# Patient Record
Sex: MALE | Race: WHITE | NOT HISPANIC OR LATINO | Employment: OTHER | URBAN - METROPOLITAN AREA
[De-identification: names, ages, dates, MRNs, and addresses within clinical notes are randomized per-mention and may not be internally consistent; named-entity substitution may affect disease eponyms.]

---

## 2023-04-04 LAB
ANION GAP IN SER/PLAS: 10 MMOL/L (ref 10–20)
CALCIUM (MG/DL) IN SER/PLAS: 9.6 MG/DL (ref 8.6–10.3)
CARBON DIOXIDE, TOTAL (MMOL/L) IN SER/PLAS: 29 MMOL/L (ref 21–32)
CHLORIDE (MMOL/L) IN SER/PLAS: 103 MMOL/L (ref 98–107)
CREATININE (MG/DL) IN SER/PLAS: 0.76 MG/DL (ref 0.5–1.3)
GFR MALE: >90 ML/MIN/1.73M2
GLUCOSE (MG/DL) IN SER/PLAS: 77 MG/DL (ref 74–99)
POTASSIUM (MMOL/L) IN SER/PLAS: 4.1 MMOL/L (ref 3.5–5.3)
SODIUM (MMOL/L) IN SER/PLAS: 138 MMOL/L (ref 136–145)
UREA NITROGEN (MG/DL) IN SER/PLAS: 15 MG/DL (ref 6–23)

## 2023-06-14 PROBLEM — I25.10 CORONARY ARTERY DISEASE INVOLVING NATIVE CORONARY ARTERY OF NATIVE HEART WITHOUT ANGINA PECTORIS: Status: ACTIVE | Noted: 2023-06-14

## 2023-06-14 PROBLEM — C61 PROSTATE CANCER (MULTI): Status: ACTIVE | Noted: 2023-06-14

## 2023-06-14 PROBLEM — G61.81: Status: ACTIVE | Noted: 2023-06-14

## 2023-06-14 PROBLEM — C67.9 BLADDER CANCER (MULTI): Status: ACTIVE | Noted: 2023-06-14

## 2023-06-14 PROBLEM — C67.9 BLADDER CANCER (MULTI): Status: RESOLVED | Noted: 2023-06-14 | Resolved: 2023-06-14

## 2023-06-14 PROBLEM — I25.10 CORONARY ARTERY DISEASE INVOLVING NATIVE CORONARY ARTERY OF NATIVE HEART WITHOUT ANGINA PECTORIS: Status: RESOLVED | Noted: 2023-06-14 | Resolved: 2023-06-14

## 2023-06-14 PROBLEM — R26.81 UNSTABLE GAIT: Status: ACTIVE | Noted: 2023-06-14

## 2023-06-14 PROBLEM — G61.81: Status: RESOLVED | Noted: 2023-06-14 | Resolved: 2023-06-14

## 2023-06-14 PROBLEM — C61 PROSTATE CANCER (MULTI): Status: RESOLVED | Noted: 2023-06-14 | Resolved: 2023-06-14

## 2023-06-15 ENCOUNTER — OFFICE VISIT (OUTPATIENT)
Dept: PRIMARY CARE | Facility: CLINIC | Age: 78
End: 2023-06-15
Payer: MEDICARE

## 2023-06-15 VITALS
WEIGHT: 167.2 LBS | DIASTOLIC BLOOD PRESSURE: 74 MMHG | BODY MASS INDEX: 24.69 KG/M2 | SYSTOLIC BLOOD PRESSURE: 110 MMHG | OXYGEN SATURATION: 98 % | HEART RATE: 88 BPM | TEMPERATURE: 97.2 F

## 2023-06-15 DIAGNOSIS — G61.81 CHRONIC INFLAMMATORY DEMYELINATING POLYNEUROPATHY (MULTI): Primary | ICD-10-CM

## 2023-06-15 DIAGNOSIS — R26.81 UNSTABLE GAIT: ICD-10-CM

## 2023-06-15 DIAGNOSIS — I25.10 CORONARY ARTERY DISEASE INVOLVING NATIVE CORONARY ARTERY OF NATIVE HEART WITHOUT ANGINA PECTORIS: ICD-10-CM

## 2023-06-15 DIAGNOSIS — B35.1 TINEA OF NAIL: ICD-10-CM

## 2023-06-15 DIAGNOSIS — C67.9 MALIGNANT NEOPLASM OF URINARY BLADDER, UNSPECIFIED SITE (MULTI): ICD-10-CM

## 2023-06-15 PROBLEM — C34.90 LUNG CANCER (MULTI): Status: ACTIVE | Noted: 2023-06-15

## 2023-06-15 PROCEDURE — 99213 OFFICE O/P EST LOW 20 MIN: CPT | Performed by: FAMILY MEDICINE

## 2023-06-15 PROCEDURE — 1036F TOBACCO NON-USER: CPT | Performed by: FAMILY MEDICINE

## 2023-06-15 PROCEDURE — 1159F MED LIST DOCD IN RCRD: CPT | Performed by: FAMILY MEDICINE

## 2023-06-15 PROCEDURE — 1160F RVW MEDS BY RX/DR IN RCRD: CPT | Performed by: FAMILY MEDICINE

## 2023-06-15 RX ORDER — KETOCONAZOLE 20 MG/ML
SHAMPOO, SUSPENSION TOPICAL
COMMUNITY
Start: 2021-09-15

## 2023-06-15 RX ORDER — ASPIRIN 81 MG/1
1 TABLET ORAL DAILY
COMMUNITY

## 2023-06-15 RX ORDER — ROSUVASTATIN CALCIUM 5 MG/1
5 TABLET, COATED ORAL DAILY
Qty: 30 TABLET | Refills: 11 | Status: SHIPPED | OUTPATIENT
Start: 2023-06-15 | End: 2024-02-28 | Stop reason: SDUPTHER

## 2023-06-15 RX ORDER — DOCUSATE SODIUM 100 MG/1
CAPSULE, LIQUID FILLED ORAL 2 TIMES DAILY
COMMUNITY
Start: 2022-07-07 | End: 2023-11-30 | Stop reason: SDUPTHER

## 2023-06-15 RX ORDER — CYCLOSPORINE 0.5 MG/ML
EMULSION OPHTHALMIC
COMMUNITY
Start: 2022-11-22 | End: 2024-01-31 | Stop reason: SDUPTHER

## 2023-06-15 RX ORDER — CARBOXYMETHYLCELLULOSE SODIUM 5 MG/ML
SOLUTION/ DROPS OPHTHALMIC 4 TIMES DAILY
COMMUNITY
Start: 2022-04-26

## 2023-06-15 RX ORDER — ACETAMINOPHEN 500 MG
TABLET ORAL
COMMUNITY

## 2023-06-15 RX ORDER — PREDNISONE 5 MG/1
1 TABLET ORAL DAILY
COMMUNITY
Start: 2022-12-19 | End: 2023-11-29 | Stop reason: SDUPTHER

## 2023-06-15 RX ORDER — POLYETHYLENE GLYCOL 3350 17 G/17G
POWDER, FOR SOLUTION ORAL
COMMUNITY

## 2023-06-15 RX ORDER — CARBIDOPA AND LEVODOPA 25; 100 MG/1; MG/1
1.5 TABLET ORAL 3 TIMES DAILY
COMMUNITY
Start: 2023-05-11 | End: 2023-10-19 | Stop reason: SDUPTHER

## 2023-06-15 NOTE — PATIENT INSTRUCTIONS
I sent in a prescription for rosuvastatin and we will check your cholesterol numbers when I see you in 3 months.  You should be contacted about a podiatry appointment.

## 2023-06-15 NOTE — PROGRESS NOTES
Subjective   Patient ID: Wade Montgomery is a 78 y.o. male who presents for Follow-up.    Myalgias that he was having when I saw him last disappeared once he stopped the atorvastatin.  We discussed trying rosuvastatin to see if he gets the same reaction.    He has developed thickened nails and wondered about seeing a podiatrist for toenail care.,  He is being followed by oncology for inflammatory demyelination and he is followed as well with his history of bladder, lung and prostate cancer.    In general, he is holding his own.  His blood pressure is fine today              Objective   /74 (BP Location: Left arm, Patient Position: Sitting, BP Cuff Size: Adult)   Pulse 88   Temp 36.2 °C (97.2 °F) (Skin)   Wt 75.8 kg (167 lb 3.2 oz)   SpO2 98%   BMI 24.69 kg/m²     Physical Exam  Constitutional:       Appearance: Normal appearance.   Cardiovascular:      Rate and Rhythm: Normal rate and regular rhythm.   Neurological:      Mental Status: He is alert.   Psychiatric:         Mood and Affect: Mood normal.         Behavior: Behavior normal.         Thought Content: Thought content normal.         Judgment: Judgment normal.         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  We will do an in office lipid panel at that time.

## 2023-08-15 ENCOUNTER — APPOINTMENT (OUTPATIENT)
Dept: PRIMARY CARE | Facility: CLINIC | Age: 78
End: 2023-08-15
Payer: MEDICARE

## 2023-09-18 PROBLEM — R49.9 UNSPECIFIED VOICE AND RESONANCE DISORDER: Status: ACTIVE | Noted: 2023-09-18

## 2023-09-18 PROBLEM — C34.32 MALIGNANT NEOPLASM OF LOWER LOBE OF LEFT LUNG (MULTI): Status: ACTIVE | Noted: 2023-09-18

## 2023-09-18 PROBLEM — G62.9 POLYNEUROPATHY: Status: ACTIVE | Noted: 2020-12-24

## 2023-09-18 PROBLEM — D47.2 MONOCLONAL GAMMOPATHY: Status: ACTIVE | Noted: 2020-12-24

## 2023-09-18 PROBLEM — H16.149 SPK (SUPERFICIAL PUNCTATE KERATITIS): Status: ACTIVE | Noted: 2023-09-18

## 2023-09-18 PROBLEM — R13.10 DYSPHAGIA: Status: ACTIVE | Noted: 2023-09-18

## 2023-09-18 PROBLEM — L82.1 OTHER SEBORRHEIC KERATOSIS: Status: ACTIVE | Noted: 2023-03-29

## 2023-09-18 PROBLEM — G61.81 CHRONIC INFLAMMATORY DEMYELINATING POLYNEURITIS (MULTI): Status: ACTIVE | Noted: 2020-12-24

## 2023-09-18 PROBLEM — R49.0 HOARSENESS OF VOICE: Status: ACTIVE | Noted: 2023-09-18

## 2023-09-18 PROBLEM — R41.3 MEMORY CHANGES: Status: ACTIVE | Noted: 2023-09-18

## 2023-09-18 PROBLEM — D22.60 MELANOCYTIC NEVI OF UNSPECIFIED UPPER LIMB, INCLUDING SHOULDER: Status: ACTIVE | Noted: 2023-03-29

## 2023-09-18 PROBLEM — N52.9 ERECTILE DISORDER: Status: ACTIVE | Noted: 2023-09-18

## 2023-09-18 PROBLEM — G20.C PARKINSONISM (MULTI): Status: ACTIVE | Noted: 2023-09-18

## 2023-09-18 PROBLEM — C44.612 BASAL CELL CARCINOMA OF SKIN OF RIGHT UPPER LIMB, INCLUDING SHOULDER: Status: ACTIVE | Noted: 2023-03-29

## 2023-09-18 PROBLEM — D64.9 ANEMIA: Status: ACTIVE | Noted: 2020-12-24

## 2023-09-18 PROBLEM — H69.90 EUSTACHIAN TUBE DYSFUNCTION: Status: ACTIVE | Noted: 2021-11-24

## 2023-09-18 PROBLEM — M17.11 ARTHRITIS OF KNEE, RIGHT: Status: ACTIVE | Noted: 2023-09-18

## 2023-09-18 PROBLEM — H52.209 ASTIGMATISM: Status: ACTIVE | Noted: 2023-09-18

## 2023-09-18 PROBLEM — R06.09 DOE (DYSPNEA ON EXERTION): Status: ACTIVE | Noted: 2023-09-18

## 2023-09-18 PROBLEM — J38.02 BILATERAL PARTIAL VOCAL CORD PARALYSIS: Status: ACTIVE | Noted: 2023-09-18

## 2023-09-18 PROBLEM — J38.3 GLOTTIC INSUFFICIENCY: Status: ACTIVE | Noted: 2023-09-18

## 2023-09-18 PROBLEM — R09.89 HEMODYNAMIC INSTABILITY: Status: ACTIVE | Noted: 2023-09-18

## 2023-09-18 PROBLEM — Z85.828 PERSONAL HISTORY OF OTHER MALIGNANT NEOPLASM OF SKIN: Status: ACTIVE | Noted: 2020-12-24

## 2023-09-18 PROBLEM — L82.0 INFLAMED SEBORRHEIC KERATOSIS: Status: ACTIVE | Noted: 2023-03-29

## 2023-09-18 PROBLEM — M71.20 BAKER CYST: Status: ACTIVE | Noted: 2023-09-18

## 2023-09-18 PROBLEM — D47.2 MGUS (MONOCLONAL GAMMOPATHY OF UNKNOWN SIGNIFICANCE): Status: ACTIVE | Noted: 2023-09-18

## 2023-09-18 PROBLEM — M48.00 SPINAL STENOSIS: Status: ACTIVE | Noted: 2023-09-18

## 2023-09-18 PROBLEM — C67.8 MALIGNANT NEOPLASM OF OVERLAPPING SITES OF BLADDER (MULTI): Status: ACTIVE | Noted: 2023-09-18

## 2023-09-18 PROBLEM — H90.3 SENSORINEURAL HEARING LOSS, BILATERAL: Status: ACTIVE | Noted: 2021-11-24

## 2023-09-18 PROBLEM — L57.0 ACTINIC KERATOSIS: Status: ACTIVE | Noted: 2023-03-29

## 2023-09-18 PROBLEM — D22.5 MELANOCYTIC NEVI OF TRUNK: Status: ACTIVE | Noted: 2023-03-29

## 2023-09-18 PROBLEM — N53.19 ABNORMAL EJACULATION: Status: ACTIVE | Noted: 2023-09-18

## 2023-09-18 PROBLEM — R91.8 LUNG NODULE, MULTIPLE: Status: ACTIVE | Noted: 2023-09-18

## 2023-09-18 PROBLEM — G52.1 GLOSSOPHARYNGEAL NERVE DISORDER: Status: ACTIVE | Noted: 2023-09-18

## 2023-09-18 PROBLEM — M65.30 TRIGGER FINGER, ACQUIRED: Status: ACTIVE | Noted: 2023-09-18

## 2023-09-18 PROBLEM — D22.4 MELANOCYTIC NEVI OF SCALP AND NECK: Status: ACTIVE | Noted: 2023-03-29

## 2023-09-18 PROBLEM — G25.9 MOVEMENT DISORDER: Status: ACTIVE | Noted: 2023-09-18

## 2023-09-18 PROBLEM — Z85.51 HISTORY OF BLADDER CANCER: Status: ACTIVE | Noted: 2023-09-18

## 2023-09-18 PROBLEM — L28.0 LICHEN SIMPLEX CHRONICUS: Status: ACTIVE | Noted: 2023-03-29

## 2023-09-18 PROBLEM — C44.319 BASAL CELL CARCINOMA OF SKIN OF OTHER PARTS OF FACE: Status: ACTIVE | Noted: 2023-03-29

## 2023-09-18 PROBLEM — R29.898 WEAKNESS OF EXTREMITY: Status: ACTIVE | Noted: 2023-09-18

## 2023-09-18 PROBLEM — E78.2 MIXED HYPERLIPIDEMIA: Status: ACTIVE | Noted: 2023-09-18

## 2023-09-18 PROBLEM — H02.839 DERMATOCHALASIS: Status: ACTIVE | Noted: 2023-09-18

## 2023-09-18 PROBLEM — H01.006 BLEPHARITIS, LEFT EYE: Status: ACTIVE | Noted: 2023-09-18

## 2023-09-18 PROBLEM — D89.89 KAPPA LIGHT CHAIN DISEASE (MULTI): Status: ACTIVE | Noted: 2023-09-18

## 2023-09-18 PROBLEM — H61.23 BILATERAL IMPACTED CERUMEN: Status: ACTIVE | Noted: 2021-11-24

## 2023-09-18 PROBLEM — K40.90 LEFT INGUINAL HERNIA: Status: ACTIVE | Noted: 2023-09-18

## 2023-09-18 PROBLEM — H52.00 HYPEROPIA: Status: ACTIVE | Noted: 2023-09-18

## 2023-09-18 PROBLEM — Z96.1 PSEUDOPHAKIA OF BOTH EYES: Status: ACTIVE | Noted: 2023-09-18

## 2023-09-18 PROBLEM — D48.5 NEOPLASM OF UNCERTAIN BEHAVIOR OF SKIN: Status: ACTIVE | Noted: 2023-03-29

## 2023-09-18 PROBLEM — R26.89 UNSTABLE BALANCE: Status: ACTIVE | Noted: 2023-09-18

## 2023-09-18 PROBLEM — C44.92 SCC (SQUAMOUS CELL CARCINOMA): Status: ACTIVE | Noted: 2023-09-18

## 2023-09-18 PROBLEM — R29.898 WEAKNESS OF BOTH HANDS: Status: ACTIVE | Noted: 2023-09-18

## 2023-09-18 PROBLEM — L90.5 SCAR CONDITION AND FIBROSIS OF SKIN: Status: ACTIVE | Noted: 2023-03-29

## 2023-09-18 PROBLEM — M65.331 TRIGGER MIDDLE FINGER OF RIGHT HAND: Status: ACTIVE | Noted: 2023-09-18

## 2023-09-18 PROBLEM — R91.8 GROUND GLASS OPACITY PRESENT ON IMAGING OF LUNG: Status: ACTIVE | Noted: 2023-09-18

## 2023-09-18 PROBLEM — R33.9 URINARY RETENTION: Status: ACTIVE | Noted: 2023-09-18

## 2023-09-18 PROBLEM — R25.3 TONGUE FASCICULATION: Status: ACTIVE | Noted: 2023-09-18

## 2023-09-18 PROBLEM — L57.9 SKIN CHANGES DUE TO CHRONIC EXPOSURE TO NONIONIZING RADIATION, UNSPECIFIED: Status: ACTIVE | Noted: 2023-03-29

## 2023-09-18 PROBLEM — D22.39 MELANOCYTIC NEVI OF OTHER PARTS OF FACE: Status: ACTIVE | Noted: 2023-03-29

## 2023-09-18 PROBLEM — E86.1 HYPOVOLEMIA: Status: ACTIVE | Noted: 2023-09-18

## 2023-09-18 PROBLEM — D22.70 MELANOCYTIC NEVI OF UNSPECIFIED LOWER LIMB, INCLUDING HIP: Status: ACTIVE | Noted: 2023-03-29

## 2023-09-18 PROBLEM — D31.32 CHOROIDAL NEVUS OF LEFT EYE: Status: ACTIVE | Noted: 2023-09-18

## 2023-09-18 PROBLEM — G60.3 IDIOPATHIC PROGRESSIVE NEUROPATHY: Status: ACTIVE | Noted: 2023-09-18

## 2023-09-18 PROBLEM — D18.01 HEMANGIOMA OF SKIN AND SUBCUTANEOUS TISSUE: Status: ACTIVE | Noted: 2023-03-29

## 2023-09-18 PROBLEM — R31.9 HEMATURIA: Status: ACTIVE | Noted: 2023-09-18

## 2023-09-18 PROBLEM — D04.4 CARCINOMA IN SITU OF SKIN OF SCALP AND NECK: Status: ACTIVE | Noted: 2023-03-29

## 2023-09-18 PROBLEM — C68.9 UROTHELIAL CANCER (MULTI): Status: ACTIVE | Noted: 2023-09-18

## 2023-09-18 PROBLEM — H26.491 PCO (POSTERIOR CAPSULAR OPACIFICATION), RIGHT: Status: ACTIVE | Noted: 2023-09-18

## 2023-09-18 PROBLEM — R20.9 DISTURBANCE OF SKIN SENSATION: Status: ACTIVE | Noted: 2023-09-18

## 2023-09-18 PROBLEM — H02.403 ACQUIRED INVOLUTIONAL PTOSIS OF BOTH EYELIDS: Status: ACTIVE | Noted: 2023-09-18

## 2023-09-18 PROBLEM — R07.9 CHEST PAIN: Status: ACTIVE | Noted: 2023-09-18

## 2023-09-18 PROBLEM — D47.2 IGM MONOCLONAL GAMMOPATHY OF UNCERTAIN SIGNIFICANCE: Status: ACTIVE | Noted: 2023-09-18

## 2023-09-18 PROBLEM — R94.39 ABNORMAL STRESS TEST: Status: ACTIVE | Noted: 2023-09-18

## 2023-09-18 PROBLEM — M48.061 SPINAL STENOSIS OF LUMBAR REGION AT MULTIPLE LEVELS: Status: ACTIVE | Noted: 2018-01-05

## 2023-09-18 PROBLEM — L21.9 SEBORRHEIC DERMATITIS, UNSPECIFIED: Status: ACTIVE | Noted: 2023-03-29

## 2023-09-18 PROBLEM — L85.3 XEROSIS CUTIS: Status: ACTIVE | Noted: 2023-03-29

## 2023-09-18 PROBLEM — M79.89 CALF SWELLING: Status: ACTIVE | Noted: 2023-09-18

## 2023-09-18 PROBLEM — G62.9 PERIPHERAL NEUROPATHY: Status: ACTIVE | Noted: 2023-09-18

## 2023-09-18 RX ORDER — SERTRALINE HYDROCHLORIDE 25 MG/1
1 TABLET, FILM COATED ORAL DAILY
COMMUNITY
Start: 2021-01-06 | End: 2023-11-30 | Stop reason: ALTCHOICE

## 2023-09-18 RX ORDER — DOCUSATE SODIUM 100 MG/1
2 CAPSULE, LIQUID FILLED ORAL DAILY
COMMUNITY

## 2023-09-18 RX ORDER — PREDNISONE 10 MG/1
1 TABLET ORAL 2 TIMES DAILY
COMMUNITY
End: 2023-11-30 | Stop reason: SDUPTHER

## 2023-09-18 RX ORDER — DICLOFENAC SODIUM 10 MG/G
4 GEL TOPICAL 4 TIMES DAILY
COMMUNITY
Start: 2022-04-20

## 2023-09-18 RX ORDER — IBUPROFEN 600 MG/1
600 TABLET ORAL EVERY 6 HOURS PRN
COMMUNITY
Start: 2022-07-07

## 2023-09-18 RX ORDER — PSYLLIUM SEED
PACKET (EA) ORAL
COMMUNITY
End: 2023-10-23 | Stop reason: ALTCHOICE

## 2023-09-18 RX ORDER — ATORVASTATIN CALCIUM 40 MG/1
1 TABLET, FILM COATED ORAL DAILY
COMMUNITY
Start: 2022-01-03 | End: 2023-11-30 | Stop reason: ALTCHOICE

## 2023-09-18 RX ORDER — CARBOXYMETHYLCELLULOSE SODIUM 10 MG/ML
1 GEL OPHTHALMIC 4 TIMES DAILY PRN
COMMUNITY
End: 2023-11-30 | Stop reason: DRUGHIGH

## 2023-09-18 RX ORDER — SILDENAFIL 100 MG/1
TABLET, FILM COATED ORAL
COMMUNITY
End: 2023-11-30 | Stop reason: ALTCHOICE

## 2023-09-18 RX ORDER — POLYETHYLENE GLYCOL 3350 17 G/17G
1 POWDER, FOR SOLUTION ORAL DAILY PRN
COMMUNITY
End: 2023-11-30 | Stop reason: SDUPTHER

## 2023-09-18 RX ORDER — ENOXAPARIN SODIUM 100 MG/ML
40 INJECTION SUBCUTANEOUS DAILY
COMMUNITY
Start: 2020-12-24 | End: 2023-10-23 | Stop reason: ALTCHOICE

## 2023-09-18 RX ORDER — MULTIVITAMIN
1 TABLET ORAL DAILY
COMMUNITY

## 2023-09-18 RX ORDER — FLUTICASONE PROPIONATE 50 MCG
2 SPRAY, SUSPENSION (ML) NASAL AS NEEDED
COMMUNITY

## 2023-09-18 RX ORDER — PREDNISONE 10 MG/1
1 TABLET ORAL DAILY
COMMUNITY
Start: 2021-10-08 | End: 2023-11-30 | Stop reason: SDUPTHER

## 2023-09-19 ENCOUNTER — APPOINTMENT (OUTPATIENT)
Dept: PRIMARY CARE | Facility: CLINIC | Age: 78
End: 2023-09-19
Payer: MEDICARE

## 2023-09-27 ENCOUNTER — TELEPHONE (OUTPATIENT)
Dept: PRIMARY CARE | Facility: CLINIC | Age: 78
End: 2023-09-27
Payer: MEDICARE

## 2023-09-27 NOTE — TELEPHONE ENCOUNTER
Katie, patient's wife called, made appt for ROBERT to Dr. Bro, but not until end of Nov.  In the meantime, they (she and Bill) are wondering about his statin dose.  You had decreased it due to he was having muscle pain.  That is gone now on the lower dose.  Should he now increase his dose?  Please advise.

## 2023-10-03 ENCOUNTER — APPOINTMENT (OUTPATIENT)
Dept: PHYSICAL THERAPY | Facility: HOSPITAL | Age: 78
End: 2023-10-03
Payer: MEDICARE

## 2023-10-18 ENCOUNTER — LAB (OUTPATIENT)
Dept: LAB | Facility: LAB | Age: 78
End: 2023-10-18
Payer: MEDICARE

## 2023-10-18 DIAGNOSIS — C67.9 MALIGNANT NEOPLASM OF BLADDER, UNSPECIFIED (MULTI): Primary | ICD-10-CM

## 2023-10-18 LAB
ANION GAP SERPL CALC-SCNC: 11 MMOL/L (ref 10–20)
BUN SERPL-MCNC: 13 MG/DL (ref 6–23)
CALCIUM SERPL-MCNC: 9.5 MG/DL (ref 8.6–10.3)
CHLORIDE SERPL-SCNC: 102 MMOL/L (ref 98–107)
CO2 SERPL-SCNC: 31 MMOL/L (ref 21–32)
CREAT SERPL-MCNC: 0.78 MG/DL (ref 0.5–1.3)
GFR SERPL CREATININE-BSD FRML MDRD: >90 ML/MIN/1.73M*2
GLUCOSE SERPL-MCNC: 83 MG/DL (ref 74–99)
POTASSIUM SERPL-SCNC: 4.8 MMOL/L (ref 3.5–5.3)
SODIUM SERPL-SCNC: 139 MMOL/L (ref 136–145)

## 2023-10-18 PROCEDURE — 80048 BASIC METABOLIC PNL TOTAL CA: CPT

## 2023-10-18 PROCEDURE — 36415 COLL VENOUS BLD VENIPUNCTURE: CPT

## 2023-10-19 ENCOUNTER — OFFICE VISIT (OUTPATIENT)
Dept: NEUROLOGY | Facility: CLINIC | Age: 78
End: 2023-10-19
Payer: MEDICARE

## 2023-10-19 VITALS
BODY MASS INDEX: 23.91 KG/M2 | DIASTOLIC BLOOD PRESSURE: 82 MMHG | RESPIRATION RATE: 18 BRPM | HEART RATE: 79 BPM | WEIGHT: 167 LBS | SYSTOLIC BLOOD PRESSURE: 118 MMHG | TEMPERATURE: 97.3 F | HEIGHT: 70 IN

## 2023-10-19 DIAGNOSIS — G20.C PRIMARY PARKINSONISM (MULTI): Primary | ICD-10-CM

## 2023-10-19 PROCEDURE — 99214 OFFICE O/P EST MOD 30 MIN: CPT | Performed by: PSYCHIATRY & NEUROLOGY

## 2023-10-19 PROCEDURE — 1160F RVW MEDS BY RX/DR IN RCRD: CPT | Performed by: PSYCHIATRY & NEUROLOGY

## 2023-10-19 PROCEDURE — 1036F TOBACCO NON-USER: CPT | Performed by: PSYCHIATRY & NEUROLOGY

## 2023-10-19 PROCEDURE — 1126F AMNT PAIN NOTED NONE PRSNT: CPT | Performed by: PSYCHIATRY & NEUROLOGY

## 2023-10-19 PROCEDURE — 1159F MED LIST DOCD IN RCRD: CPT | Performed by: PSYCHIATRY & NEUROLOGY

## 2023-10-19 RX ORDER — CARBIDOPA AND LEVODOPA 25; 100 MG/1; MG/1
TABLET ORAL
Qty: 540 TABLET | Refills: 3 | Status: SHIPPED | OUTPATIENT
Start: 2023-10-19 | End: 2023-11-14 | Stop reason: SDUPTHER

## 2023-10-19 NOTE — PATIENT INSTRUCTIONS
"It was a pleasure seeing you today.     Increase Sinemet to 1.5 tabs three times daily x 1 week.  Take 2 tabs / 1.5 / 1.5 tabs x 1 week  2 tabs/ 2 tabs/ 1.5 tabs x 1 week  2 tabs three times daily and continue     I want you to get a MICHAEL scan- Please call radiology to schedule at [phone].   If the results are abnormal, I will call you to discuss.    Please make a follow up appointment in March/ April and phone/virtual visit in 6-8 weeks for update on your medication change.      For any urgent issues or needing to speak to a medical assistant please call [phone], option 6 during our office hours Monday-Friday 8am-4pm, and leave a voicemail with your concern.  My office will try to reach back you as soon as possible within 24 (business) hours.  If you have an emergency please call 911 or visit a local urgent care or nearest emergency room.      Please understand that Giveo is a useful communication tool for simple \"normal\" results or a refill request but I would not recommend using this tool for emergent or urgent issues or for conversations with me.  I am happy to ask my staff to rearrange a follow up visit or a virtual visit sooner than requested if appropriate for your care.    "

## 2023-10-19 NOTE — PROGRESS NOTES
Subjective     Wade Montgomery is a 78 y.o. year old male here for parkinsonism follow up.   Arrives with wife who provides more history.  HPI  He is doing PT.    Balance seems better.  No lightheadedness or hallucinations.  No nauseated.   MRI Brain was normal.  Sinemet was started 1 tab TID - no AE. Speech is better, is the most noticeable change.   He has CIPD. IVIG treatments he [place] [facility]    Wife does sleep with him and noticed the acting out the dreams.  He is very slow, stiff in the morning.   Also noticed fine motor movements are impaired, facial expressions are masked more. Voice is softer.   a walker.   He goes to speech therapy now for vocal cord paralysis.      achr ab testing normal.        FH: no PD in family. father had neuropathy. spine issues in family. 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   PMH: scoliosis. neuropathy.      hx of craniotomy and SDH 2012 -secondary from acting out is dreams ( hit his night stand)     Review of Systems    Patient Active Problem List   Diagnosis    Bladder cancer (CMS/HCC)    Chronic inflammatory demyelinating polyneuropathy (CMS/HCC)    Coronary artery disease involving native coronary artery of native heart without angina pectoris    Prostate cancer (CMS/HCC)    Unstable gait    Lung cancer (CMS/HCC)    Abnormal ejaculation    Abnormal stress test    Acquired involutional ptosis of both eyelids    Arthritis of knee, right    Astigmatism    Hyperopia    Baker cyst    Basal cell carcinoma of skin of right upper limb, including shoulder    Basal cell carcinoma of skin of other parts of face    Bilateral impacted cerumen    Bilateral partial vocal cord paralysis    Blepharitis, left eye    Calf swelling    Carcinoma in situ of skin of scalp and neck    Choroidal nevus of left eye    Dermatochalasis    Disturbance of skin sensation    CHAUDHARI (dyspnea on exertion)    Dysphagia    Eustachian tube  dysfunction    Glossopharyngeal nerve disorder    Peripheral neuropathy    Glottic insufficiency    Ground glass opacity present on imaging of lung    Hemangioma of skin and subcutaneous tissue    Hematuria    Hemodynamic instability    History of bladder cancer    Personal history of other malignant neoplasm of skin    Hoarseness of voice    Hypovolemia    IgM monoclonal gammopathy of uncertain significance    MGUS (monoclonal gammopathy of unknown significance)    Kappa light chain disease (CMS/HCC)    Left inguinal hernia    Lichen simplex chronicus    Lung nodule, multiple    Malignant neoplasm of lower lobe of left lung (CMS/HCC)    Melanocytic nevi of scalp and neck    Melanocytic nevi of trunk    Melanocytic nevi of unspecified lower limb, including hip    Melanocytic nevi of unspecified upper limb, including shoulder    Melanocytic nevi of other parts of face    Memory changes    Mixed hyperlipidemia    Movement disorder    Neoplasm of uncertain behavior of skin    Actinic keratosis    Inflamed seborrheic keratosis    Other seborrheic keratosis    Skin changes due to chronic exposure to nonionizing radiation, unspecified    Parkinsonism    PCO (posterior capsular opacification), right    Pseudophakia of both eyes    Scar condition and fibrosis of skin    Seborrheic dermatitis, unspecified    Sensorineural hearing loss, bilateral    Spinal stenosis of lumbar region at multiple levels    Spinal stenosis    SPK (superficial punctate keratitis)    Tongue fasciculation    Trigger finger, acquired    Trigger middle finger of right hand    Unspecified voice and resonance disorder    Unstable balance    Urinary retention    Weakness of both hands    Weakness of extremity    Xerosis cutis    Malignant neoplasm of overlapping sites of bladder (CMS/HCC)    Urothelial cancer (CMS/HCC)    SCC (squamous cell carcinoma)    Anemia    Chest pain    Chronic inflammatory demyelinating polyneuritis (CMS/HCC)    Polyneuropathy     Erectile disorder    Monoclonal gammopathy    Idiopathic progressive neuropathy     Past Medical History:   Diagnosis Date    Bicipital tendinitis, left shoulder 02/27/2014    Biceps tendonitis on left    Contact with and (suspected) exposure to unspecified communicable disease 10/24/2019    Exposure to communicable disease    Dry eye syndrome of bilateral lacrimal glands     Dry eye syndrome of both eyes    Encounter for general adult medical examination without abnormal findings 08/19/2019    Initial Medicare annual wellness visit    Other chest pain 07/14/2015    Chest pain, muscular    Pain in left hip 04/20/2022    Left hip pain    Pain in left knee 04/20/2022    Left knee pain    Pelvic and perineal pain 08/19/2019    Suprapubic pressure    Personal history of other diseases of the circulatory system     History of subdural hematoma    Personal history of other diseases of the digestive system 05/10/2018    History of rectal bleeding    Personal history of other diseases of the musculoskeletal system and connective tissue 02/09/2022    History of low back pain    Personal history of other diseases of the respiratory system 02/27/2014    History of acute bronchitis    Personal history of other diseases of urinary system     History of hematuria    Personal history of other drug therapy     History of influenza vaccination    Personal history of other infectious and parasitic diseases 09/09/2019    History of tinea cruris    Personal history of other infectious and parasitic diseases 03/30/2017    History of tinea cruris    Personal history of other specified conditions 09/09/2020    History of flank pain    Personal history of other specified conditions 03/03/2020    History of nasal congestion    Personal history of other specified conditions 02/14/2022    History of dysuria    Personal history of other specified conditions 12/10/2019    History of epigastric pain    Personal history of urinary calculi  07/24/2019    History of urinary stone    Rash and other nonspecific skin eruption 01/06/2017    Rash    Retention of urine, unspecified 11/25/2020    Urinary retention with incomplete bladder emptying    Strain of muscle, fascia and tendon of lower back, initial encounter 07/14/2015    Lumbar strain    Trochanteric bursitis, left hip 09/22/2021    Greater trochanteric bursitis of left hip     Past Surgical History:   Procedure Laterality Date    CT GUIDED PERCUTANEOUS BIOPSY LUNG  2/9/2022    CT GUIDED PERCUTANEOUS BIOPSY LUNG 2/9/2022 [facility]    OTHER SURGICAL HISTORY  02/13/2020    Hernia repair    OTHER SURGICAL HISTORY  02/13/2020    Knee surgery    OTHER SURGICAL HISTORY  02/13/2020    Trigger finger repair    OTHER SURGICAL HISTORY  01/13/2020    Craniotomy    OTHER SURGICAL HISTORY  01/13/2020    Carpal tunnel surgery    OTHER SURGICAL HISTORY  10/27/2020    Sinus surgery    OTHER SURGICAL HISTORY  02/16/2022    Cataract surgery    OTHER SURGICAL HISTORY  01/26/2021    Nerve biospy     Social History     Tobacco Use    Smoking status: Never     Passive exposure: Past    Smokeless tobacco: Never   Substance Use Topics    Alcohol use: Not Currently     family history includes Dementia in his maternal grandmother and mother; Diabetes type I in his son; Heart attack in his father; Liver cancer in his father; Macular degeneration in his father; Scoliosis in his mother; peripheral neuropathy in his father.    Current Outpatient Medications:     acetaminophen (TYLENOL ORAL), Tylenol, Disp: , Rfl:     aspirin 81 mg EC tablet, Take 1 tablet (81 mg) by mouth once daily., Disp: , Rfl:     atorvastatin (Lipitor) 40 mg tablet, Take 1 tablet (40 mg) by mouth once daily., Disp: , Rfl:     carbidopa-levodopa (Sinemet)  mg tablet, Take by mouth., Disp: , Rfl:     carboxymethylcellulose (Refresh Celluvisc) 1 % ophthalmic solution dropperette, Administer 1 drop into affected eye(s) 4 times a day as needed. to each  affected eye, Disp: , Rfl:     carboxymethylcellulose (Refresh Plus) 0.5 % ophthalmic solution, Administer into affected eye(s) 4 times a day., Disp: , Rfl:     diclofenac sodium (Voltaren) 1 % gel gel, Apply 1 Application topically 4 times a day.  APPLY TO LOWER EXTREMITIES. DO NOT APPLY MORE THAN 16 GM DAILY TO ANY ONE AFFECTED JOINT., Disp: , Rfl:     diphenhydramine HCl (BENADRYL ORAL), Benadryl, Disp: , Rfl:     docusate sodium (Colace) 100 mg capsule, Take by mouth twice a day., Disp: , Rfl:     docusate sodium (Colace) 100 mg capsule, Take 2 capsules (200 mg) by mouth once daily., Disp: , Rfl:     enoxaparin (Lovenox) 40 mg/0.4 mL syringe, Inject 0.4 mL (40 mg) under the skin once daily., Disp: , Rfl:     fluticasone (Flonase) 50 mcg/actuation nasal spray, Administer 2 sprays into each nostril once daily., Disp: , Rfl:     fluticasone (Veramyst) 27.5 mcg/actuation nasal spray, Administer 2 sprays into affected nostril(s) if needed., Disp: , Rfl:     HYDROCORTISONE TOP, hydrocortisone, Disp: , Rfl:     ibuprofen (IBU) 600 mg tablet, Take 1 tablet (600 mg) by mouth every 6 hours if needed., Disp: , Rfl:     immune globul G-gly-IgA avg 46 (Gamunex-C) 20 gram/200 mL (10 %) solution, Infuse 70 gm divided over 1 days every 3 weeks x 6 months. Premedicate with 650 mg acetaminophen PO, 25 mg diphenhydramine PO, and 100 mg hydrocortisone IV push., Disp: , Rfl:     immune globulin, human, (Gammagard Liquid 10%) infusion, 70 unit(s) injectable every 3 weeks, Disp: , Rfl:     ketoconazole (NIZOral) 2 % shampoo, Ketoconazole 2 % External Shampoo Quantity: 120  Refills: 0  Start: 15-Sep-2021, Disp: , Rfl:     melatonin 5 mg tablet, Take by mouth., Disp: , Rfl:     MELATONIN ORAL, melatonin, Disp: , Rfl:     multivitamin tablet, Take 1 tablet by mouth once daily. Take with food, Disp: , Rfl:     polyethylene glycol (Miralax) 17 gram/dose powder, Take by mouth., Disp: , Rfl:     polyethylene glycol (Miralax) 17 gram/dose  powder, Take 1 Dose by mouth once daily as needed., Disp: , Rfl:     prednisolone (MILLIPRED ORAL), prednisolone, Disp: , Rfl:     predniSONE (Deltasone) 10 mg tablet, Take 1 tablet (10 mg) by mouth once daily., Disp: , Rfl:     predniSONE (Deltasone) 10 mg tablet, Take 1 tablet (10 mg) by mouth 2 times a day., Disp: , Rfl:     predniSONE (Deltasone) 5 mg tablet, Take 1 tablet (5 mg) by mouth once daily., Disp: , Rfl:     psyllium (Metamucil, sugar,) powder, Take by mouth., Disp: , Rfl:     Restasis 0.05 % ophthalmic emulsion, Administer into affected eye(s)., Disp: , Rfl:     rosuvastatin (Crestor) 5 mg tablet, Take 1 tablet (5 mg) by mouth once daily., Disp: 30 tablet, Rfl: 11    sertraline (Zoloft) 25 mg tablet, Take 1 tablet (25 mg) by mouth once daily., Disp: , Rfl:     sildenafil (Viagra) 100 mg tablet, Take by mouth. take 1 tablet by oral route every day as needed approximately 1 hour before sexual activity, Disp: , Rfl:     white petrolatum-mineral oiL 94-3 % ophthalmic ointment, Apply 1 Application to affected eye(s) once daily at bedtime. to each affected eye, Disp: , Rfl:   Allergies   Allergen Reactions    Oxycodone Other and Hallucinations     Psychosis    Penicillin V Other     There were no vitals taken for this visit.  Neurological Exam/Physical Exam:    Constitutional: General appearance: no acute distress. Pleasant.   Auscultation of Heart: Regular rate and rhythm, no murmurs, normal S1 and S2.   Carotid Arteries: Intact without any bruits   Peripheral Vascular Exam: Pulses +2 and equal in all extremities. No swelling, edema or tenderness to palpations   Mental status: The patient was in no distress, alert, interactive and cooperative. Affect is appropriate.   Orientation: oriented to person, oriented to place and oriented to time.   Memory: recent memory intact   Attention: normal attention  Language: normal comprehension   Fund of knowledge: Patient displays adequate knowledge of current  events  Eyes: The ophthalmoscopic examination was normal.   Cranial nerve II: Visual fields full to confrontation.   Cranial nerves III, IV, and VI: Pupils round, equally reactive to light; no ptosis. EOMs intact. No nystagmus.   Cranial Nerve V: Facial sensation intact to LT bilaterally.   Cranial nerve VII: Normal and symmetric facial strength.   Cranial nerve VIII: Hearing is intact bilaterally to finger rub.  Cranial nerves IX and X: Palate elevates symmetrically.   Cranial nerve XI: Shoulder shrug and neck rotation strength are intact.   Cranial nerve XII: Tongue is midline.  Motor:  Muscle bulk was normal in both upper and lower extremities.    No atrophy.   Strength is slightly decreased in hands, has flexion of some fingers, duputeryen contracture of R and L 2nd and 3rd digits.   Deep Tendon Reflexes: left biceps 2+ , right biceps 2+, left triceps 2+, right triceps 2+, left brachioradialis 2+, right brachioradialis 2+, left patella 2+, right patella 2+, left ankle jerk 1+, right ankle jerk 1+   Plantar Reflex: Toes downgoing to plantar stimulation on the left. Toes downgoing to plantar stimulation on the right.   Sensory Exam: decreased vibration and LT in both legs up to knees b/l.  Coordination: There is no limb dystaxia and rapid alternating movements are intact.   Gait:  uses walker. Slow . Scoliosis . Right leg slightly slower.        Labs:    CBC:   Lab Results   Component Value Date    WBC 4.1 (L) 09/29/2023    HGB 13.1 (L) 09/29/2023    HCT 39.3 (L) 09/29/2023     09/29/2023     BMP:   Lab Results   Component Value Date     10/18/2023    K 4.8 10/18/2023     10/18/2023    CO2 31 10/18/2023    BUN 13 10/18/2023    CREATININE 0.78 10/18/2023    CALCIUM 9.5 10/18/2023    MG 2.01 12/28/2020    PHOS 2.4 (L) 12/28/2020     LFT:   Lab Results   Component Value Date    ALKPHOS 59 09/29/2023    BILITOT 0.6 09/29/2023    BILIDIR 0.3 12/28/2020    PROT 7.5 09/29/2023    ALBUMIN 3.9 09/29/2023     ALT 3 (L) 09/29/2023    AST 19 09/29/2023         Assessment/Plan   Problem List Items Addressed This Visit             ICD-10-CM    Parkinsonism - Primary G20.C    atypical features. 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   CIPD neuropathy.   obtain MICHAEL scan and maximize levodopa - if not helping we may wean off levodopa and observe.

## 2023-10-20 ENCOUNTER — INFUSION (OUTPATIENT)
Dept: HEMATOLOGY/ONCOLOGY | Facility: CLINIC | Age: 78
End: 2023-10-20
Payer: MEDICARE

## 2023-10-20 ENCOUNTER — ANCILLARY PROCEDURE (OUTPATIENT)
Dept: RADIOLOGY | Facility: CLINIC | Age: 78
End: 2023-10-20
Payer: MEDICARE

## 2023-10-20 ENCOUNTER — APPOINTMENT (OUTPATIENT)
Dept: HEMATOLOGY/ONCOLOGY | Facility: CLINIC | Age: 78
End: 2023-10-20
Payer: MEDICARE

## 2023-10-20 VITALS
OXYGEN SATURATION: 98 % | SYSTOLIC BLOOD PRESSURE: 103 MMHG | DIASTOLIC BLOOD PRESSURE: 70 MMHG | HEART RATE: 85 BPM | TEMPERATURE: 98.1 F

## 2023-10-20 DIAGNOSIS — C61 PROSTATE CANCER (MULTI): ICD-10-CM

## 2023-10-20 DIAGNOSIS — G61.81 CHRONIC INFLAMMATORY DEMYELINATING POLYNEURITIS (MULTI): Primary | ICD-10-CM

## 2023-10-20 DIAGNOSIS — C67.9 MALIGNANT NEOPLASM OF BLADDER, UNSPECIFIED (MULTI): Primary | ICD-10-CM

## 2023-10-20 PROCEDURE — 71260 CT THORAX DX C+: CPT | Performed by: RADIOLOGY

## 2023-10-20 PROCEDURE — 74177 CT ABD & PELVIS W/CONTRAST: CPT | Performed by: RADIOLOGY

## 2023-10-20 PROCEDURE — 96523 IRRIG DRUG DELIVERY DEVICE: CPT

## 2023-10-20 PROCEDURE — 2550000001 HC RX 255 CONTRASTS: Performed by: STUDENT IN AN ORGANIZED HEALTH CARE EDUCATION/TRAINING PROGRAM

## 2023-10-20 PROCEDURE — 74177 CT ABD & PELVIS W/CONTRAST: CPT

## 2023-10-20 RX ORDER — EPINEPHRINE 0.3 MG/.3ML
0.3 INJECTION SUBCUTANEOUS EVERY 5 MIN PRN
Status: CANCELLED | OUTPATIENT
Start: 2023-10-23

## 2023-10-20 RX ORDER — ALBUTEROL SULFATE 0.83 MG/ML
3 SOLUTION RESPIRATORY (INHALATION) AS NEEDED
Status: CANCELLED | OUTPATIENT
Start: 2023-10-23

## 2023-10-20 RX ORDER — ACETAMINOPHEN 325 MG/1
650 TABLET ORAL ONCE
Status: CANCELLED | OUTPATIENT
Start: 2023-10-23 | End: 2023-10-23

## 2023-10-20 RX ORDER — FAMOTIDINE 10 MG/ML
20 INJECTION INTRAVENOUS ONCE AS NEEDED
Status: CANCELLED | OUTPATIENT
Start: 2023-10-23

## 2023-10-20 RX ORDER — DIPHENHYDRAMINE HYDROCHLORIDE 50 MG/ML
50 INJECTION INTRAMUSCULAR; INTRAVENOUS AS NEEDED
Status: CANCELLED | OUTPATIENT
Start: 2023-10-23

## 2023-10-20 RX ORDER — HEPARIN SODIUM,PORCINE/PF 10 UNIT/ML
50 SYRINGE (ML) INTRAVENOUS AS NEEDED
Status: CANCELLED | OUTPATIENT
Start: 2023-10-23

## 2023-10-20 RX ORDER — DIPHENHYDRAMINE HCL 25 MG
25 CAPSULE ORAL ONCE
Status: CANCELLED | OUTPATIENT
Start: 2023-10-23 | End: 2023-10-23

## 2023-10-20 RX ADMIN — IOHEXOL 75 ML: 350 INJECTION, SOLUTION INTRAVENOUS at 10:26

## 2023-10-20 ASSESSMENT — PAIN SCALES - GENERAL: PAINLEVEL: 0-NO PAIN

## 2023-10-23 ENCOUNTER — INFUSION (OUTPATIENT)
Dept: INFUSION THERAPY | Facility: HOSPITAL | Age: 78
End: 2023-10-23
Payer: MEDICARE

## 2023-10-23 VITALS
DIASTOLIC BLOOD PRESSURE: 87 MMHG | BODY MASS INDEX: 24.52 KG/M2 | RESPIRATION RATE: 16 BRPM | HEART RATE: 86 BPM | SYSTOLIC BLOOD PRESSURE: 135 MMHG | WEIGHT: 168.43 LBS | OXYGEN SATURATION: 97 % | TEMPERATURE: 97.2 F

## 2023-10-23 DIAGNOSIS — G61.81 CHRONIC INFLAMMATORY DEMYELINATING POLYNEURITIS (MULTI): Primary | ICD-10-CM

## 2023-10-23 LAB
ALBUMIN SERPL BCP-MCNC: 3.9 G/DL (ref 3.4–5)
ALP SERPL-CCNC: 60 U/L (ref 33–136)
ALT SERPL W P-5'-P-CCNC: 4 U/L (ref 10–52)
ANION GAP SERPL CALC-SCNC: 10 MMOL/L (ref 10–20)
AST SERPL W P-5'-P-CCNC: 19 U/L (ref 9–39)
BASOPHILS # BLD AUTO: 0.01 X10*3/UL (ref 0–0.1)
BASOPHILS NFR BLD AUTO: 0.2 %
BILIRUB SERPL-MCNC: 0.5 MG/DL (ref 0–1.2)
BUN SERPL-MCNC: 11 MG/DL (ref 6–23)
CALCIUM SERPL-MCNC: 9.2 MG/DL (ref 8.6–10.3)
CHLORIDE SERPL-SCNC: 102 MMOL/L (ref 98–107)
CO2 SERPL-SCNC: 28 MMOL/L (ref 21–32)
CREAT SERPL-MCNC: 0.75 MG/DL (ref 0.5–1.3)
EOSINOPHIL # BLD AUTO: 0.01 X10*3/UL (ref 0–0.4)
EOSINOPHIL NFR BLD AUTO: 0.2 %
ERYTHROCYTE [DISTWIDTH] IN BLOOD BY AUTOMATED COUNT: 12.8 % (ref 11.5–14.5)
GFR SERPL CREATININE-BSD FRML MDRD: >90 ML/MIN/1.73M*2
GLUCOSE SERPL-MCNC: 83 MG/DL (ref 74–99)
HCT VFR BLD AUTO: 38.6 % (ref 41–52)
HGB BLD-MCNC: 13 G/DL (ref 13.5–17.5)
IMM GRANULOCYTES # BLD AUTO: 0.01 X10*3/UL (ref 0–0.5)
IMM GRANULOCYTES NFR BLD AUTO: 0.2 % (ref 0–0.9)
LYMPHOCYTES # BLD AUTO: 0.46 X10*3/UL (ref 0.8–3)
LYMPHOCYTES NFR BLD AUTO: 7.1 %
MCH RBC QN AUTO: 31.8 PG (ref 26–34)
MCHC RBC AUTO-ENTMCNC: 33.7 G/DL (ref 32–36)
MCV RBC AUTO: 94 FL (ref 80–100)
MONOCYTES # BLD AUTO: 0.15 X10*3/UL (ref 0.05–0.8)
MONOCYTES NFR BLD AUTO: 2.3 %
NEUTROPHILS # BLD AUTO: 5.83 X10*3/UL (ref 1.6–5.5)
NEUTROPHILS NFR BLD AUTO: 90 %
NRBC BLD-RTO: 0 /100 WBCS (ref 0–0)
PLATELET # BLD AUTO: 152 X10*3/UL (ref 150–450)
PMV BLD AUTO: 10.9 FL (ref 7.5–11.5)
POTASSIUM SERPL-SCNC: 4 MMOL/L (ref 3.5–5.3)
PROT SERPL-MCNC: 7.3 G/DL (ref 6.4–8.2)
RBC # BLD AUTO: 4.09 X10*6/UL (ref 4.5–5.9)
SODIUM SERPL-SCNC: 136 MMOL/L (ref 136–145)
WBC # BLD AUTO: 6.5 X10*3/UL (ref 4.4–11.3)

## 2023-10-23 PROCEDURE — 96375 TX/PRO/DX INJ NEW DRUG ADDON: CPT | Mod: INF

## 2023-10-23 PROCEDURE — 85025 COMPLETE CBC W/AUTO DIFF WBC: CPT

## 2023-10-23 PROCEDURE — 2500000004 HC RX 250 GENERAL PHARMACY W/ HCPCS (ALT 636 FOR OP/ED): Performed by: PSYCHIATRY & NEUROLOGY

## 2023-10-23 PROCEDURE — 96366 THER/PROPH/DIAG IV INF ADDON: CPT | Mod: INF

## 2023-10-23 PROCEDURE — 96365 THER/PROPH/DIAG IV INF INIT: CPT | Mod: INF

## 2023-10-23 PROCEDURE — 2500000001 HC RX 250 WO HCPCS SELF ADMINISTERED DRUGS (ALT 637 FOR MEDICARE OP): Performed by: PSYCHIATRY & NEUROLOGY

## 2023-10-23 PROCEDURE — 84075 ASSAY ALKALINE PHOSPHATASE: CPT

## 2023-10-23 RX ORDER — HEPARIN 100 UNIT/ML
500 SYRINGE INTRAVENOUS AS NEEDED
Status: CANCELLED | OUTPATIENT
Start: 2023-10-23

## 2023-10-23 RX ORDER — DIPHENHYDRAMINE HCL 25 MG
25 CAPSULE ORAL ONCE
Status: CANCELLED | OUTPATIENT
Start: 2023-11-17 | End: 2023-11-17

## 2023-10-23 RX ORDER — EPINEPHRINE 0.3 MG/.3ML
0.3 INJECTION SUBCUTANEOUS EVERY 5 MIN PRN
Status: DISCONTINUED | OUTPATIENT
Start: 2023-10-23 | End: 2023-11-03 | Stop reason: HOSPADM

## 2023-10-23 RX ORDER — HEPARIN 100 UNIT/ML
500 SYRINGE INTRAVENOUS AS NEEDED
Status: DISCONTINUED | OUTPATIENT
Start: 2023-10-23 | End: 2023-11-03 | Stop reason: HOSPADM

## 2023-10-23 RX ORDER — DIPHENHYDRAMINE HYDROCHLORIDE 50 MG/ML
50 INJECTION INTRAMUSCULAR; INTRAVENOUS AS NEEDED
Status: DISCONTINUED | OUTPATIENT
Start: 2023-10-23 | End: 2023-11-03 | Stop reason: HOSPADM

## 2023-10-23 RX ORDER — FAMOTIDINE 10 MG/ML
20 INJECTION INTRAVENOUS ONCE AS NEEDED
Status: DISCONTINUED | OUTPATIENT
Start: 2023-10-23 | End: 2023-11-03 | Stop reason: HOSPADM

## 2023-10-23 RX ORDER — FAMOTIDINE 10 MG/ML
20 INJECTION INTRAVENOUS ONCE AS NEEDED
Status: CANCELLED | OUTPATIENT
Start: 2023-11-17

## 2023-10-23 RX ORDER — DIPHENHYDRAMINE HYDROCHLORIDE 50 MG/ML
50 INJECTION INTRAMUSCULAR; INTRAVENOUS AS NEEDED
Status: CANCELLED | OUTPATIENT
Start: 2023-11-17

## 2023-10-23 RX ORDER — DIPHENHYDRAMINE HCL 25 MG
25 CAPSULE ORAL ONCE
Status: COMPLETED | OUTPATIENT
Start: 2023-10-23 | End: 2023-10-23

## 2023-10-23 RX ORDER — ALBUTEROL SULFATE 0.83 MG/ML
3 SOLUTION RESPIRATORY (INHALATION) AS NEEDED
Status: CANCELLED | OUTPATIENT
Start: 2023-11-17

## 2023-10-23 RX ORDER — EPINEPHRINE 0.3 MG/.3ML
0.3 INJECTION SUBCUTANEOUS EVERY 5 MIN PRN
Status: CANCELLED | OUTPATIENT
Start: 2023-11-17

## 2023-10-23 RX ORDER — HEPARIN SODIUM,PORCINE/PF 10 UNIT/ML
50 SYRINGE (ML) INTRAVENOUS AS NEEDED
Status: CANCELLED | OUTPATIENT
Start: 2023-10-23

## 2023-10-23 RX ORDER — ALBUTEROL SULFATE 0.83 MG/ML
3 SOLUTION RESPIRATORY (INHALATION) AS NEEDED
Status: DISCONTINUED | OUTPATIENT
Start: 2023-10-23 | End: 2023-11-03 | Stop reason: HOSPADM

## 2023-10-23 RX ORDER — ACETAMINOPHEN 325 MG/1
650 TABLET ORAL ONCE
Status: COMPLETED | OUTPATIENT
Start: 2023-10-23 | End: 2023-10-23

## 2023-10-23 RX ORDER — ACETAMINOPHEN 325 MG/1
650 TABLET ORAL ONCE
Status: CANCELLED | OUTPATIENT
Start: 2023-11-17 | End: 2023-11-17

## 2023-10-23 RX ADMIN — Medication 500 UNITS: at 14:16

## 2023-10-23 RX ADMIN — IMMUNE GLOBULIN (HUMAN) 70 G: 10 INJECTION INTRAVENOUS; SUBCUTANEOUS at 10:06

## 2023-10-23 RX ADMIN — DIPHENHYDRAMINE HYDROCHLORIDE 25 MG: 25 CAPSULE ORAL at 08:52

## 2023-10-23 RX ADMIN — METHYLPREDNISOLONE SODIUM SUCCINATE 100 MG: 125 INJECTION, POWDER, FOR SOLUTION INTRAMUSCULAR; INTRAVENOUS at 09:12

## 2023-10-23 RX ADMIN — ACETAMINOPHEN 650 MG: 325 TABLET ORAL at 08:52

## 2023-10-23 SDOH — ECONOMIC STABILITY: FOOD INSECURITY: WITHIN THE PAST 12 MONTHS, THE FOOD YOU BOUGHT JUST DIDN'T LAST AND YOU DIDN'T HAVE MONEY TO GET MORE.: NEVER TRUE

## 2023-10-23 SDOH — ECONOMIC STABILITY: FOOD INSECURITY: WITHIN THE PAST 12 MONTHS, YOU WORRIED THAT YOUR FOOD WOULD RUN OUT BEFORE YOU GOT MONEY TO BUY MORE.: NEVER TRUE

## 2023-10-23 ASSESSMENT — PATIENT HEALTH QUESTIONNAIRE - PHQ9
1. LITTLE INTEREST OR PLEASURE IN DOING THINGS: NOT AT ALL
2. FEELING DOWN, DEPRESSED OR HOPELESS: NOT AT ALL
SUM OF ALL RESPONSES TO PHQ9 QUESTIONS 1 AND 2: 0

## 2023-10-23 ASSESSMENT — COLUMBIA-SUICIDE SEVERITY RATING SCALE - C-SSRS
6. HAVE YOU EVER DONE ANYTHING, STARTED TO DO ANYTHING, OR PREPARED TO DO ANYTHING TO END YOUR LIFE?: NO
2. HAVE YOU ACTUALLY HAD ANY THOUGHTS OF KILLING YOURSELF?: NO
1. IN THE PAST MONTH, HAVE YOU WISHED YOU WERE DEAD OR WISHED YOU COULD GO TO SLEEP AND NOT WAKE UP?: NO

## 2023-10-23 ASSESSMENT — ENCOUNTER SYMPTOMS
DEPRESSION: 0
OCCASIONAL FEELINGS OF UNSTEADINESS: 1
LOSS OF SENSATION IN FEET: 1

## 2023-10-25 DIAGNOSIS — C67.9 MALIGNANT NEOPLASM OF URINARY BLADDER, UNSPECIFIED SITE (MULTI): Primary | ICD-10-CM

## 2023-10-31 ENCOUNTER — TELEPHONE (OUTPATIENT)
Dept: RADIATION ONCOLOGY | Facility: HOSPITAL | Age: 78
End: 2023-10-31
Payer: MEDICARE

## 2023-10-31 NOTE — TELEPHONE ENCOUNTER
Called pt. to remind of appointment on 11/2/2023 at 2:00 pm with Dr. Fitzgerald. Pt's phone went to voicemail was unable to leave a message vm box is full.

## 2023-11-01 ENCOUNTER — HOSPITAL ENCOUNTER (OUTPATIENT)
Dept: RADIATION ONCOLOGY | Facility: HOSPITAL | Age: 78
Setting detail: RADIATION/ONCOLOGY SERIES
Discharge: STILL ADMITTED OR ADMINISTRATIVE | End: 2023-11-01
Payer: MEDICARE

## 2023-11-01 VITALS
OXYGEN SATURATION: 99 % | HEIGHT: 69 IN | WEIGHT: 170.19 LBS | HEART RATE: 81 BPM | RESPIRATION RATE: 18 BRPM | BODY MASS INDEX: 25.21 KG/M2 | DIASTOLIC BLOOD PRESSURE: 76 MMHG | SYSTOLIC BLOOD PRESSURE: 119 MMHG | TEMPERATURE: 97.3 F

## 2023-11-01 DIAGNOSIS — C34.90 MALIGNANT NEOPLASM OF LUNG, UNSPECIFIED LATERALITY, UNSPECIFIED PART OF LUNG (MULTI): Primary | ICD-10-CM

## 2023-11-01 PROCEDURE — 99213 OFFICE O/P EST LOW 20 MIN: CPT | Performed by: STUDENT IN AN ORGANIZED HEALTH CARE EDUCATION/TRAINING PROGRAM

## 2023-11-01 ASSESSMENT — ENCOUNTER SYMPTOMS
GASTROINTESTINAL NEGATIVE: 1
CARDIOVASCULAR NEGATIVE: 1
RESPIRATORY NEGATIVE: 1
OCCASIONAL FEELINGS OF UNSTEADINESS: 1
LOSS OF SENSATION IN FEET: 1
PSYCHIATRIC NEGATIVE: 1
EYES NEGATIVE: 1
FATIGUE: 1
DEPRESSION: 0

## 2023-11-01 ASSESSMENT — PATIENT HEALTH QUESTIONNAIRE - PHQ9
2. FEELING DOWN, DEPRESSED OR HOPELESS: NOT AT ALL
SUM OF ALL RESPONSES TO PHQ9 QUESTIONS 1 AND 2: 0
1. LITTLE INTEREST OR PLEASURE IN DOING THINGS: NOT AT ALL

## 2023-11-01 NOTE — PROGRESS NOTES
Radiation Oncology Follow-Up    Patient Name:  Wade Saucedo  MRN:  [id number]  :  1945    Referring Provider: No ref. provider found  Primary Care Provider: Ileana Bro DO  Care Team: Patient Care Team:  Ileana Bro DO as PCP - General (Family Medicine)  Champ Cuba MD as PCP - [employer]age PCP    Date of Service: 2023     SUBJECTIVE  History of Present Illness:   Donato Saucedo is a 78 y.o. male who was previously seen at the [facility] Department of Radiation Oncology for Followup.      Mr. Saucedo is a 77 year old male with history of bladder ca/ prostate cancer   s/p cystectomy presents with newly diagnosed Stage I lung cancer. He underwent   SBRT to the LLL - 55 Gy in 5 fractions completing treatment on 3/21/2022. He is   here for a routine follow-up.      Patient is doing well. He has no new complaints. Denies worsening SOB, chest   pain, cough or any other associated symptoms. He denies any esophageal   symptoms.    He underwent CT chest/abd/pelvis on 10/20/2023 - that showed stable thoracic findings.       Review of Systems:   Review of Systems   Constitutional:  Positive for fatigue.   HENT:  Negative.     Eyes: Negative.    Respiratory: Negative.     Cardiovascular: Negative.    Gastrointestinal: Negative.    Genitourinary: Negative.     Musculoskeletal:  Positive for gait problem.   Skin: Negative.    Neurological:  Positive for gait problem.   Psychiatric/Behavioral: Negative.       The patient's current pain level was assessed.  They report currently having a pain of 0 out of 10.  They feel their pain is under control without the use of pain medications.    Performance Status:   The Karnofsky performance scale today is 60, Requires occasional assistance, but is able to care for most of his personal needs (ECOG equivalent 2).        OBJECTIVE  Vital Signs:  /76 (BP Location: Right arm, Patient Position: Sitting, BP  "Cuff Size: Adult)   Pulse 81   Temp 36.3 °C (97.3 °F) (Temporal)   Resp 18   Ht 1.753 m (5' 9\")   Wt 77.2 kg (170 lb 3.1 oz)   SpO2 99%   BMI 25.13 kg/m²    Physical Exam:  Constitutional: Well developed, awake/alert/oriented x3, no distress, alert and   cooperative   Respiratory/Thorax: Clear to auscultation bilaterally.   CV: Normal Rate and Rhythm  Lymphatic: No significant lymphadenopathy        ASSESSMENT:  Wade Montgomery is a 78 y.o. male with history of bladder ca/ prostate cancer   s/p cystectomy presents with newly diagnosed Stage I lung cancer. He underwent   SBRT to the LLL - 55 Gy in 5 fractions completing treatment on 3/21/2022. He is here for routine follow-up.  PLAN:  I personally reviewed the imaging and discussed with the patient. Return to clinic in 6 months with a routine CT chest. Imaging will be ordered by Dr. Gomez along with his pelvis scan.            "

## 2023-11-14 ENCOUNTER — TELEPHONE (OUTPATIENT)
Dept: NEUROLOGY | Facility: CLINIC | Age: 78
End: 2023-11-14
Payer: MEDICARE

## 2023-11-14 DIAGNOSIS — G20.C PRIMARY PARKINSONISM (MULTI): ICD-10-CM

## 2023-11-14 RX ORDER — CARBIDOPA AND LEVODOPA 25; 100 MG/1; MG/1
TABLET ORAL
Qty: 540 TABLET | Refills: 3
Start: 2023-11-14 | End: 2023-11-30 | Stop reason: SINTOL

## 2023-11-14 NOTE — TELEPHONE ENCOUNTER
Patient wife calling stating since Sinmet increase patient has experienced hallucinations,extreme fatigue and some brain fog.    Please advise.

## 2023-11-14 NOTE — TELEPHONE ENCOUNTER
We talked today. Patient and wife both on phone. He had episode of hallucinations.  He is taking Sinemet 2 tabs TID --  they did not notice any difference in the movement. ( Did not make a difference in bradykinesia/rigidity). But he thought he saw someone at night , was a shadow like feature, thought it was his son.   I advised him to lower the Sinemet to 1.5 tabs TID.

## 2023-11-16 ENCOUNTER — TELEPHONE (OUTPATIENT)
Dept: INFUSION THERAPY | Facility: HOSPITAL | Age: 78
End: 2023-11-16

## 2023-11-16 ENCOUNTER — OFFICE VISIT (OUTPATIENT)
Dept: UROLOGY | Facility: CLINIC | Age: 78
End: 2023-11-16
Payer: MEDICARE

## 2023-11-16 VITALS — WEIGHT: 168 LBS | BODY MASS INDEX: 24.05 KG/M2 | TEMPERATURE: 97.5 F | HEIGHT: 70 IN

## 2023-11-16 DIAGNOSIS — Z12.5 SCREENING PSA (PROSTATE SPECIFIC ANTIGEN): ICD-10-CM

## 2023-11-16 DIAGNOSIS — C67.9 MALIGNANT NEOPLASM OF URINARY BLADDER, UNSPECIFIED SITE (MULTI): Primary | ICD-10-CM

## 2023-11-16 PROCEDURE — 1126F AMNT PAIN NOTED NONE PRSNT: CPT | Performed by: STUDENT IN AN ORGANIZED HEALTH CARE EDUCATION/TRAINING PROGRAM

## 2023-11-16 PROCEDURE — 99214 OFFICE O/P EST MOD 30 MIN: CPT | Performed by: STUDENT IN AN ORGANIZED HEALTH CARE EDUCATION/TRAINING PROGRAM

## 2023-11-16 PROCEDURE — 88112 CYTOPATH CELL ENHANCE TECH: CPT

## 2023-11-16 PROCEDURE — 1159F MED LIST DOCD IN RCRD: CPT | Performed by: STUDENT IN AN ORGANIZED HEALTH CARE EDUCATION/TRAINING PROGRAM

## 2023-11-16 PROCEDURE — 1036F TOBACCO NON-USER: CPT | Performed by: STUDENT IN AN ORGANIZED HEALTH CARE EDUCATION/TRAINING PROGRAM

## 2023-11-16 PROCEDURE — 1160F RVW MEDS BY RX/DR IN RCRD: CPT | Performed by: STUDENT IN AN ORGANIZED HEALTH CARE EDUCATION/TRAINING PROGRAM

## 2023-11-16 ASSESSMENT — PAIN SCALES - GENERAL: PAINLEVEL: 0-NO PAIN

## 2023-11-16 NOTE — PROGRESS NOTES
HPI:  Procedure: RC+IC 12/24/2020  Pathology: CIS, GG3 T3A prostate adenocarcinoma     Proc (2/9/22): left lung lower lobe biopsy (performed by Dr. 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 S/p RC+IC. Had COVID in May 2022. PSA <0.01 (9/20/22). Lung cancer treated with radiation. CT CAP (4/6/23) showed extensive reticulonodular densities scattered throughout both lungs as well as areas of infiltrative density and nodularity as well as consolidative opacity at the left lung base overall stable since 02/13/2023, s/p cystectomy and prostatectomy with ureteral diversion and ileal conduit creation, mild prominence of the proximal renal collecting systems and renal pelvis bilaterally which is overall new or slightly more pronounced compared to prior, although the ureters appear grossly stable in caliber with the right slightly more pronounced compared to the left, there is suggestion of minimal urothelial enhancement just proximal to the ileal conduit with some minimal regional stranding. Has started speech therapy and will start seeing a movement Neurologist. CT CAP (10/20/23) showed No CT evidence of an acute process within the chest, abdomen, or pelvis, stable abnormalities in the lungs, s/p cystectomy with right lower quadrant ileal conduit, no hydronephrosis, stable minimal nonspecific stranding surrounding the distal aspect of the ureters, just proximal to the conduit. Reports irritation, burning, and rash on penile tip, underside, and scrotum. Has been recently diagnosed with Parkinsonism.      2/12/21 PSA <0.10, 6/2021:<0.01   Cre: 0.75 (10/23/23), 0.76 (4/4/23), 0.67 (6/23/22), 0.79 (12/17/21)  UA (2/14/22): moderate (2+) blood  Bacterial urine culture (2/14/22): Klebsiella pneumoniae >100,000 CFU/ML  Urine cytology (6/30/22): no malignant cells identified.     CT Chest: slight enlargement of 6mm  nodule, AP LAKESHA.      CT CAP w contrast (12/18/2021): Stable 1.8 cm right adrenal nodule. No new subdiaphragmatic metastasis. Enlargement of lung nodule, now 1.4 cm, previously 0.6 cm.     PET CT (1/20/22): A hypermetabolic left lower lobe pulmonary nodule is concerning for a primary malignancy. Metastatic disease is possible but felt to be less likely. No evidence for hypermetabolic metastatic disease within the abdomen and pelvis. Postoperative changes of radical cystoprostatectomy with ileal conduit formation. No evidence for hypermetabolic disease in the surgical bed.     CT Chest without Contrast (6/22/22): In comparison to previous examination there has been interval decrease in size of left lower lobe pulmonary nodule now measuring 9 mm x 6 mm. Interval development of an indeterminate cavitary lesion/nodule within the right upper lobe measuring 8 mm x 6 mm. Interval T8 and T10 osteoporotic appearing compression fractures, likely acute versus subacute.     CT AP w IV Contrast (9/22/22): Interval increase in size of enlarged left paratracheal lymph nodes, concerning for new/worsening metastatic lymphadenopathy. Benign/reactive lymphadenopathy is a differential consideration. Interval morphologic change of a previously cavitary and now entirely solid 0.8 cm right upper lobe nodule without significant change in size. A new 0.9 cm cavitary lesion is seen in the adjacent lung. These findings are indeterminate and differential considerations include metastatic disease, a 2nd lung primary with new satellite lesion, or a localized infectious/inflammatory process. Stable mild compression deformities of the T8 and T10 vertebral bodies. No definite evidence of malignancy or metastatic disease in the abdomen or pelvis. Stable postsurgical changes from prostatectomy and cystectomy with ileal conduit creation. No evident complication.     CT CAP (4/6/23): extensive reticulonodular densities scattered throughout both lungs as  well as areas of infiltrative density and nodularity as well as consolidative opacity at the left lung base overall stable since 02/13/2023, s/p cystectomy and prostatectomy with ureteral diversion and ileal conduit creation, mild prominence of the proximal renal collecting systems and renal pelvis bilaterally which is overall new or slightly more pronounced compared to prior, although the ureters appear grossly stable in caliber with the right slightly more pronounced compared to the left, there is suggestion of minimal urothelial enhancement just proximal to the ileal conduit with some minimal regional stranding.     CT CAP (10/20/23): No CT evidence of an acute process within the chest, abdomen, or pelvis, stable abnormalities in the lungs, s/p cystectomy with right lower quadrant ileal conduit, no hydronephrosis, stable minimal nonspecific stranding surrounding the distal aspect of the ureters, just proximal to the conduit.    Review of Systems:  All systems reviewed. Anything negative noted in the HPI.    Physical Exam:  Vitals signs reviewed.  Constitutional:      Appearance: Well-developed.  HENT:     Head: Normocephalic and atraumatic.  Neck:     Musculoskeletal: Normal range of motion.  Pulmonary:     Effort: Pulmonary effort is normal.  Musculoskeletal: Normal range of motion.  Skin:     General: Skin is warm and dry.  Neurological:     Mental Status: Alert and oriented to person, place, and time.  Psychiatric:        Behavior: Behavior normal.        Thought Content: Thought content normal.        Judgment: Judgment normal.  Genitourinary:     Penis: Normal.      Scrotum/Testes: Normal.     Comments:  Abdominal:     Palpations: Abdomen is soft. There is no mass.     Tenderness: There is no tenderness. There is no guarding or rebound.     Hernia: No hernia is present.     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 S/p RC+IC. Had COVID in May 2022. PSA <0.01  (9/20/22). Lung cancer treated with radiation. CT CAP (4/6/23) showed extensive reticulonodular densities scattered throughout both lungs as well as areas of infiltrative density and nodularity as well as consolidative opacity at the left lung base overall stable since 02/13/2023, s/p cystectomy and prostatectomy with ureteral diversion and ileal conduit creation, mild prominence of the proximal renal collecting systems and renal pelvis bilaterally which is overall new or slightly more pronounced compared to prior, although the ureters appear grossly stable in caliber with the right slightly more pronounced compared to the left, there is suggestion of minimal urothelial enhancement just proximal to the ileal conduit with some minimal regional stranding. Has started speech therapy and will start seeing a movement Neurologist. CT CAP (10/20/23) showed No CT evidence of an acute process within the chest, abdomen, or pelvis, stable abnormalities in the lungs, s/p cystectomy with right lower quadrant ileal conduit, no hydronephrosis, stable minimal nonspecific stranding surrounding the distal aspect of the ureters, just proximal to the conduit. Reports irritation, burning, and rash on penile tip, underside, and scrotum. Has been recently diagnosed with Parkinsonism. Management options including risks, benefits and alternatives discussed at length and all questions answered. Patient prefers to proceed with PSA, CT CAP, and follow-up in 6mos.     UA and cytology         By signing my name below, I, Maria Guadalupe Main, attest that this documentation has been prepared under the direction and in the presence of Dr. Jeremiah Gomez.  All medical record entries made by the [facility] were at my direction and personally dictated by me. I have reviewed the chart and agree that the record accurately reflects my personal performance of the history, physical exam, discussion and plan.

## 2023-11-17 ENCOUNTER — INFUSION (OUTPATIENT)
Dept: INFUSION THERAPY | Facility: HOSPITAL | Age: 78
End: 2023-11-17
Payer: MEDICARE

## 2023-11-17 VITALS
DIASTOLIC BLOOD PRESSURE: 87 MMHG | OXYGEN SATURATION: 94 % | SYSTOLIC BLOOD PRESSURE: 139 MMHG | HEART RATE: 85 BPM | RESPIRATION RATE: 18 BRPM | TEMPERATURE: 98.5 F

## 2023-11-17 DIAGNOSIS — G61.81 CHRONIC INFLAMMATORY DEMYELINATING POLYNEURITIS (MULTI): ICD-10-CM

## 2023-11-17 LAB
ALBUMIN SERPL BCP-MCNC: 4 G/DL (ref 3.4–5)
ALP SERPL-CCNC: 61 U/L (ref 33–136)
ALT SERPL W P-5'-P-CCNC: 7 U/L (ref 10–52)
ANION GAP SERPL CALC-SCNC: 11 MMOL/L (ref 10–20)
AST SERPL W P-5'-P-CCNC: 22 U/L (ref 9–39)
BASOPHILS # BLD AUTO: 0.02 X10*3/UL (ref 0–0.1)
BASOPHILS NFR BLD AUTO: 0.4 %
BILIRUB SERPL-MCNC: 0.6 MG/DL (ref 0–1.2)
BUN SERPL-MCNC: 15 MG/DL (ref 6–23)
CALCIUM SERPL-MCNC: 9.2 MG/DL (ref 8.6–10.3)
CHLORIDE SERPL-SCNC: 101 MMOL/L (ref 98–107)
CO2 SERPL-SCNC: 28 MMOL/L (ref 21–32)
CREAT SERPL-MCNC: 0.8 MG/DL (ref 0.5–1.3)
EOSINOPHIL # BLD AUTO: 0.03 X10*3/UL (ref 0–0.4)
EOSINOPHIL NFR BLD AUTO: 0.6 %
ERYTHROCYTE [DISTWIDTH] IN BLOOD BY AUTOMATED COUNT: 13 % (ref 11.5–14.5)
GFR SERPL CREATININE-BSD FRML MDRD: >90 ML/MIN/1.73M*2
GLUCOSE SERPL-MCNC: 76 MG/DL (ref 74–99)
HCT VFR BLD AUTO: 39.6 % (ref 41–52)
HGB BLD-MCNC: 13.2 G/DL (ref 13.5–17.5)
IMM GRANULOCYTES # BLD AUTO: 0.01 X10*3/UL (ref 0–0.5)
IMM GRANULOCYTES NFR BLD AUTO: 0.2 % (ref 0–0.9)
LYMPHOCYTES # BLD AUTO: 1.47 X10*3/UL (ref 0.8–3)
LYMPHOCYTES NFR BLD AUTO: 29.1 %
MCH RBC QN AUTO: 31.7 PG (ref 26–34)
MCHC RBC AUTO-ENTMCNC: 33.3 G/DL (ref 32–36)
MCV RBC AUTO: 95 FL (ref 80–100)
MONOCYTES # BLD AUTO: 0.57 X10*3/UL (ref 0.05–0.8)
MONOCYTES NFR BLD AUTO: 11.3 %
NEUTROPHILS # BLD AUTO: 2.95 X10*3/UL (ref 1.6–5.5)
NEUTROPHILS NFR BLD AUTO: 58.4 %
NRBC BLD-RTO: 0 /100 WBCS (ref 0–0)
PLATELET # BLD AUTO: 149 X10*3/UL (ref 150–450)
POTASSIUM SERPL-SCNC: 4.4 MMOL/L (ref 3.5–5.3)
PROT SERPL-MCNC: 7.3 G/DL (ref 6.4–8.2)
RBC # BLD AUTO: 4.16 X10*6/UL (ref 4.5–5.9)
SODIUM SERPL-SCNC: 136 MMOL/L (ref 136–145)
WBC # BLD AUTO: 5.1 X10*3/UL (ref 4.4–11.3)

## 2023-11-17 PROCEDURE — 96365 THER/PROPH/DIAG IV INF INIT: CPT | Mod: INF

## 2023-11-17 PROCEDURE — 96375 TX/PRO/DX INJ NEW DRUG ADDON: CPT | Mod: INF

## 2023-11-17 PROCEDURE — 85025 COMPLETE CBC W/AUTO DIFF WBC: CPT

## 2023-11-17 PROCEDURE — 96366 THER/PROPH/DIAG IV INF ADDON: CPT | Mod: INF

## 2023-11-17 PROCEDURE — 2500000001 HC RX 250 WO HCPCS SELF ADMINISTERED DRUGS (ALT 637 FOR MEDICARE OP): Performed by: PSYCHIATRY & NEUROLOGY

## 2023-11-17 PROCEDURE — 2500000004 HC RX 250 GENERAL PHARMACY W/ HCPCS (ALT 636 FOR OP/ED): Performed by: PSYCHIATRY & NEUROLOGY

## 2023-11-17 PROCEDURE — 84075 ASSAY ALKALINE PHOSPHATASE: CPT

## 2023-11-17 RX ORDER — DIPHENHYDRAMINE HYDROCHLORIDE 50 MG/ML
50 INJECTION INTRAMUSCULAR; INTRAVENOUS AS NEEDED
Status: CANCELLED | OUTPATIENT
Start: 2023-12-08

## 2023-11-17 RX ORDER — DIPHENHYDRAMINE HCL 25 MG
25 CAPSULE ORAL ONCE
Status: COMPLETED | OUTPATIENT
Start: 2023-11-17 | End: 2023-11-17

## 2023-11-17 RX ORDER — ACETAMINOPHEN 325 MG/1
650 TABLET ORAL ONCE
Status: CANCELLED | OUTPATIENT
Start: 2023-12-08 | End: 2023-12-08

## 2023-11-17 RX ORDER — FAMOTIDINE 10 MG/ML
20 INJECTION INTRAVENOUS ONCE AS NEEDED
Status: CANCELLED | OUTPATIENT
Start: 2023-12-08

## 2023-11-17 RX ORDER — HEPARIN SODIUM,PORCINE/PF 10 UNIT/ML
50 SYRINGE (ML) INTRAVENOUS AS NEEDED
Status: CANCELLED | OUTPATIENT
Start: 2023-11-17

## 2023-11-17 RX ORDER — ALBUTEROL SULFATE 0.83 MG/ML
3 SOLUTION RESPIRATORY (INHALATION) AS NEEDED
Status: CANCELLED | OUTPATIENT
Start: 2023-12-08

## 2023-11-17 RX ORDER — DIPHENHYDRAMINE HCL 25 MG
25 CAPSULE ORAL ONCE
Status: CANCELLED | OUTPATIENT
Start: 2023-12-08 | End: 2023-12-08

## 2023-11-17 RX ORDER — EPINEPHRINE 0.3 MG/.3ML
0.3 INJECTION SUBCUTANEOUS EVERY 5 MIN PRN
Status: CANCELLED | OUTPATIENT
Start: 2023-12-08

## 2023-11-17 RX ORDER — ACETAMINOPHEN 325 MG/1
650 TABLET ORAL ONCE
Status: COMPLETED | OUTPATIENT
Start: 2023-11-17 | End: 2023-11-17

## 2023-11-17 RX ORDER — HEPARIN 100 UNIT/ML
500 SYRINGE INTRAVENOUS AS NEEDED
Status: CANCELLED | OUTPATIENT
Start: 2023-11-17

## 2023-11-17 RX ORDER — HEPARIN 100 UNIT/ML
500 SYRINGE INTRAVENOUS AS NEEDED
Status: DISCONTINUED | OUTPATIENT
Start: 2023-11-17 | End: 2023-11-17 | Stop reason: HOSPADM

## 2023-11-17 RX ADMIN — IMMUNE GLOBULIN (HUMAN) 70 G: 10 INJECTION INTRAVENOUS; SUBCUTANEOUS at 09:30

## 2023-11-17 RX ADMIN — METHYLPREDNISOLONE SODIUM SUCCINATE 100 MG: 125 INJECTION, POWDER, FOR SOLUTION INTRAMUSCULAR; INTRAVENOUS at 08:30

## 2023-11-17 RX ADMIN — Medication 500 UNITS: at 13:27

## 2023-11-17 RX ADMIN — ACETAMINOPHEN 650 MG: 325 TABLET ORAL at 08:31

## 2023-11-17 RX ADMIN — DIPHENHYDRAMINE HYDROCHLORIDE 25 MG: 25 CAPSULE ORAL at 08:31

## 2023-11-17 ASSESSMENT — COLUMBIA-SUICIDE SEVERITY RATING SCALE - C-SSRS
1. IN THE PAST MONTH, HAVE YOU WISHED YOU WERE DEAD OR WISHED YOU COULD GO TO SLEEP AND NOT WAKE UP?: NO
6. HAVE YOU EVER DONE ANYTHING, STARTED TO DO ANYTHING, OR PREPARED TO DO ANYTHING TO END YOUR LIFE?: NO
2. HAVE YOU ACTUALLY HAD ANY THOUGHTS OF KILLING YOURSELF?: NO

## 2023-11-17 ASSESSMENT — ENCOUNTER SYMPTOMS
LOSS OF SENSATION IN FEET: 0
OCCASIONAL FEELINGS OF UNSTEADINESS: 1
DEPRESSION: 0

## 2023-11-19 NOTE — ADDENDUM NOTE
Encounter addended by: Giovanni Fitzgerald MD on: 11/19/2023 2:33 PM   Actions taken: Visit diagnoses modified, Level of Service modified

## 2023-11-24 LAB
LABORATORY COMMENT REPORT: NORMAL
PATH REPORT.FINAL DX SPEC: NORMAL
PATH REPORT.FINAL DX SPEC: NORMAL
PATH REPORT.GROSS SPEC: NORMAL
PATH REPORT.GROSS SPEC: NORMAL
PATH REPORT.RELEVANT HX SPEC: NORMAL
PATH REPORT.RELEVANT HX SPEC: NORMAL
PATH REPORT.TOTAL CANCER: NORMAL
PATH REPORT.TOTAL CANCER: NORMAL

## 2023-11-28 ENCOUNTER — APPOINTMENT (OUTPATIENT)
Dept: RADIOLOGY | Facility: HOSPITAL | Age: 78
End: 2023-11-28
Payer: MEDICARE

## 2023-11-29 DIAGNOSIS — G61.81 CHRONIC INFLAMMATORY DEMYELINATING POLYNEUROPATHY (MULTI): ICD-10-CM

## 2023-11-29 RX ORDER — PREDNISONE 5 MG/1
5 TABLET ORAL DAILY
Qty: 90 TABLET | Refills: 3 | Status: SHIPPED | OUTPATIENT
Start: 2023-11-29 | End: 2024-03-04 | Stop reason: SDUPTHER

## 2023-11-30 ENCOUNTER — TELEPHONE (OUTPATIENT)
Dept: NEUROLOGY | Facility: CLINIC | Age: 78
End: 2023-11-30

## 2023-11-30 ENCOUNTER — OFFICE VISIT (OUTPATIENT)
Dept: PRIMARY CARE | Facility: CLINIC | Age: 78
End: 2023-11-30
Payer: MEDICARE

## 2023-11-30 VITALS
WEIGHT: 169 LBS | HEART RATE: 92 BPM | TEMPERATURE: 97.6 F | BODY MASS INDEX: 24.6 KG/M2 | SYSTOLIC BLOOD PRESSURE: 122 MMHG | OXYGEN SATURATION: 96 % | DIASTOLIC BLOOD PRESSURE: 86 MMHG

## 2023-11-30 DIAGNOSIS — D89.89 KAPPA LIGHT CHAIN DISEASE (MULTI): ICD-10-CM

## 2023-11-30 DIAGNOSIS — E78.2 MIXED HYPERLIPIDEMIA: Primary | ICD-10-CM

## 2023-11-30 DIAGNOSIS — K59.00 CONSTIPATION, UNSPECIFIED CONSTIPATION TYPE: ICD-10-CM

## 2023-11-30 DIAGNOSIS — C78.02 SECONDARY MALIGNANT NEOPLASM OF LEFT LUNG (MULTI): ICD-10-CM

## 2023-11-30 DIAGNOSIS — G20.C PARKINSONISM, UNSPECIFIED PARKINSONISM TYPE (MULTI): ICD-10-CM

## 2023-11-30 DIAGNOSIS — E27.8 OTHER SPECIFIED DISORDERS OF ADRENAL GLAND (MULTI): ICD-10-CM

## 2023-11-30 DIAGNOSIS — I95.9 HYPOTENSION, UNSPECIFIED HYPOTENSION TYPE: ICD-10-CM

## 2023-11-30 PROCEDURE — 1036F TOBACCO NON-USER: CPT | Performed by: STUDENT IN AN ORGANIZED HEALTH CARE EDUCATION/TRAINING PROGRAM

## 2023-11-30 PROCEDURE — 1160F RVW MEDS BY RX/DR IN RCRD: CPT | Performed by: STUDENT IN AN ORGANIZED HEALTH CARE EDUCATION/TRAINING PROGRAM

## 2023-11-30 PROCEDURE — 99214 OFFICE O/P EST MOD 30 MIN: CPT | Performed by: STUDENT IN AN ORGANIZED HEALTH CARE EDUCATION/TRAINING PROGRAM

## 2023-11-30 PROCEDURE — 1159F MED LIST DOCD IN RCRD: CPT | Performed by: STUDENT IN AN ORGANIZED HEALTH CARE EDUCATION/TRAINING PROGRAM

## 2023-11-30 PROCEDURE — 1126F AMNT PAIN NOTED NONE PRSNT: CPT | Performed by: STUDENT IN AN ORGANIZED HEALTH CARE EDUCATION/TRAINING PROGRAM

## 2023-11-30 ASSESSMENT — ENCOUNTER SYMPTOMS
FATIGUE: 0
ABDOMINAL PAIN: 0
DYSURIA: 0
DYSPHORIC MOOD: 0
COUGH: 0
PALPITATIONS: 0
DIZZINESS: 0
WHEEZING: 0
SHORTNESS OF BREATH: 0
NAUSEA: 0
NERVOUS/ANXIOUS: 0
CHILLS: 0
HEADACHES: 0
HEMATURIA: 0
NUMBNESS: 0
FEVER: 0
DIARRHEA: 0
FREQUENCY: 0
CONSTIPATION: 1

## 2023-11-30 ASSESSMENT — PATIENT HEALTH QUESTIONNAIRE - PHQ9
SUM OF ALL RESPONSES TO PHQ9 QUESTIONS 1 AND 2: 0
2. FEELING DOWN, DEPRESSED OR HOPELESS: NOT AT ALL
1. LITTLE INTEREST OR PLEASURE IN DOING THINGS: NOT AT ALL

## 2023-11-30 NOTE — TELEPHONE ENCOUNTER
Patient's wife calling stating she would like patient to go back to taking 0.5 of Sinmet instead of a full tab due to having too many bowl movemets with a full tablet.    Please advise.

## 2023-11-30 NOTE — PATIENT INSTRUCTIONS
Will have you recheck fasting lipid panel to make sure you are on the right dose of rosuvastatin  Insert home BP please check your BP at home daily, write down your results, and bring it to your next appt. Check it after sitting for at least 30 minutes. Do not smoke for 30 minutes prior to checking your blood pressure and avoid caffeine prior to checking.  If your blood pressure is labile at home, recommend talking with your neurologist  Continue following with specialists

## 2023-11-30 NOTE — PROGRESS NOTES
"Subjective   Patient ID: Wade Montgomery \"Donato\" is a 78 y.o. male who presents for Transfer Of Care (From Dr Cuba), Medication Question (Was discussed by Dr Cuba that Crestor would be increased but hasn't yet.), Med Refill, and Flu Vaccine (Pt already got it.).    HPI   Patient was started on rosuvastatin earlier this year. Hasn't had cholesterol checked since 2/2022.     BP low today. He gets it checked during his infusions and it occasionally goes low and sometimes high. Does have parkinson's, new dx. Fluid in take is good.  Does not feel dizzy.    Is having problem with constipation 2/2 levodopa-carbidopa. His GI recommended doing an enema and continue with colace and miralax.      Review of Systems   Constitutional:  Negative for chills, fatigue and fever.   Respiratory:  Negative for cough, shortness of breath and wheezing.    Cardiovascular:  Negative for chest pain, palpitations and leg swelling.   Gastrointestinal:  Positive for constipation. Negative for abdominal pain, diarrhea and nausea.   Genitourinary:  Negative for dysuria, frequency, hematuria and urgency.   Neurological:  Negative for dizziness, numbness and headaches.   Psychiatric/Behavioral:  Negative for dysphoric mood. The patient is not nervous/anxious.        Objective   /86 (BP Location: Left arm, Patient Position: Sitting, BP Cuff Size: Adult)   Pulse 92   Temp 36.4 °C (97.6 °F) (Temporal)   Wt 76.7 kg (169 lb)   SpO2 96%   BMI 24.60 kg/m²     Physical Exam  Constitutional:       Appearance: Normal appearance.   HENT:      Head: Normocephalic and atraumatic.   Eyes:      Extraocular Movements: Extraocular movements intact.      Pupils: Pupils are equal, round, and reactive to light.   Cardiovascular:      Rate and Rhythm: Normal rate and regular rhythm.      Heart sounds: Normal heart sounds. No murmur heard.  Pulmonary:      Effort: Pulmonary effort is normal.      Breath sounds: Normal breath sounds. No wheezing. "   Abdominal:      General: Bowel sounds are normal.      Palpations: Abdomen is soft.      Tenderness: There is no abdominal tenderness. There is no guarding.   Musculoskeletal:         General: Normal range of motion.   Skin:     General: Skin is warm and dry.   Neurological:      General: No focal deficit present.      Mental Status: He is alert and oriented to person, place, and time.   Psychiatric:         Mood and Affect: Mood normal.         Behavior: Behavior normal.         Assessment/Plan   Problem List Items Addressed This Visit       Kappa light chain disease (CMS/HCC)    Mixed hyperlipidemia - Primary    Relevant Orders    Lipid Panel    Parkinsonism    Secondary malignant neoplasm of left lung (CMS/HCC)    Other specified disorders of adrenal gland (CMS/HCC)     Other Visit Diagnoses       Hypotension, unspecified hypotension type        Constipation, unspecified constipation type              We will recheck fasting lipids as it has not been rechecked since switching from atorvastatin to rosuvastatin.  Will decide dose of medication pending results  Blood pressure improved on second check.  I did ask that he check his blood pressure at home a couple times a week and if it is labile there that he should let his neurologist know given his Parkinson's diagnosis  He is going to continue following with neurology for his kappa light chain disease.  He also follows with oncology for his lung cancer, bladder, and prostate cancer.  Follows with urology as well.       Patient understands and agrees with treatment plan    Ileana Bro, DO   11/30/23

## 2023-12-04 ENCOUNTER — LAB (OUTPATIENT)
Dept: LAB | Facility: LAB | Age: 78
End: 2023-12-04
Payer: MEDICARE

## 2023-12-04 DIAGNOSIS — E78.2 MIXED HYPERLIPIDEMIA: ICD-10-CM

## 2023-12-04 LAB
CHOLEST SERPL-MCNC: 137 MG/DL (ref 0–199)
CHOLESTEROL/HDL RATIO: 2.1
HDLC SERPL-MCNC: 64.5 MG/DL
LDLC SERPL CALC-MCNC: 63 MG/DL
NON HDL CHOLESTEROL: 73 MG/DL (ref 0–149)
TRIGL SERPL-MCNC: 46 MG/DL (ref 0–149)
VLDL: 9 MG/DL (ref 0–40)

## 2023-12-04 PROCEDURE — 80061 LIPID PANEL: CPT

## 2023-12-05 ENCOUNTER — HOSPITAL ENCOUNTER (OUTPATIENT)
Dept: RADIOLOGY | Facility: HOSPITAL | Age: 78
End: 2023-12-05
Payer: MEDICARE

## 2023-12-05 ENCOUNTER — APPOINTMENT (OUTPATIENT)
Dept: RADIOLOGY | Facility: HOSPITAL | Age: 78
End: 2023-12-05
Payer: MEDICARE

## 2023-12-07 ENCOUNTER — APPOINTMENT (OUTPATIENT)
Dept: RADIOLOGY | Facility: HOSPITAL | Age: 78
End: 2023-12-07
Payer: MEDICARE

## 2023-12-07 ENCOUNTER — HOSPITAL ENCOUNTER (OUTPATIENT)
Dept: RADIOLOGY | Facility: HOSPITAL | Age: 78
Discharge: HOME | End: 2023-12-07
Payer: MEDICARE

## 2023-12-07 DIAGNOSIS — G20.C PRIMARY PARKINSONISM (MULTI): ICD-10-CM

## 2023-12-07 PROCEDURE — A9584 IODINE I-123 IOFLUPANE: HCPCS | Performed by: PSYCHIATRY & NEUROLOGY

## 2023-12-07 PROCEDURE — 2500000001 HC RX 250 WO HCPCS SELF ADMINISTERED DRUGS (ALT 637 FOR MEDICARE OP): Performed by: PSYCHIATRY & NEUROLOGY

## 2023-12-07 PROCEDURE — 78803 RP LOCLZJ TUM SPECT 1 AREA: CPT

## 2023-12-07 PROCEDURE — 78803 RP LOCLZJ TUM SPECT 1 AREA: CPT | Performed by: RADIOLOGY

## 2023-12-07 PROCEDURE — 3430000001 HC RX 343 DIAGNOSTIC RADIOPHARMACEUTICALS: Performed by: PSYCHIATRY & NEUROLOGY

## 2023-12-07 RX ORDER — IOFLUPANE I-123 2 MCI/ML
4.3 INJECTION, SOLUTION INTRAVENOUS
Status: COMPLETED | OUTPATIENT
Start: 2023-12-07 | End: 2023-12-07

## 2023-12-07 RX ADMIN — IOFLUPANE I-123 4.3 MILLICURIE: 2 INJECTION, SOLUTION INTRAVENOUS at 12:01

## 2023-12-07 RX ADMIN — IODINE SOLUTION STRONG 5% (LUGOL'S) 0.8 ML: 5 SOLUTION at 10:45

## 2023-12-08 ENCOUNTER — INFUSION (OUTPATIENT)
Dept: INFUSION THERAPY | Facility: HOSPITAL | Age: 78
End: 2023-12-08
Payer: MEDICARE

## 2023-12-08 VITALS
DIASTOLIC BLOOD PRESSURE: 72 MMHG | HEART RATE: 84 BPM | RESPIRATION RATE: 18 BRPM | OXYGEN SATURATION: 98 % | WEIGHT: 167.33 LBS | SYSTOLIC BLOOD PRESSURE: 113 MMHG | BODY MASS INDEX: 24.36 KG/M2 | TEMPERATURE: 98.2 F

## 2023-12-08 DIAGNOSIS — G61.81 CHRONIC INFLAMMATORY DEMYELINATING POLYNEURITIS (MULTI): ICD-10-CM

## 2023-12-08 LAB
ALBUMIN SERPL BCP-MCNC: 3.9 G/DL (ref 3.4–5)
ALP SERPL-CCNC: 63 U/L (ref 33–136)
ALT SERPL W P-5'-P-CCNC: 15 U/L (ref 10–52)
ANION GAP SERPL CALC-SCNC: 11 MMOL/L (ref 10–20)
AST SERPL W P-5'-P-CCNC: 19 U/L (ref 9–39)
BASOPHILS # BLD AUTO: 0.02 X10*3/UL (ref 0–0.1)
BASOPHILS NFR BLD AUTO: 0.4 %
BILIRUB SERPL-MCNC: 0.7 MG/DL (ref 0–1.2)
BUN SERPL-MCNC: 12 MG/DL (ref 6–23)
CALCIUM SERPL-MCNC: 9.3 MG/DL (ref 8.6–10.3)
CHLORIDE SERPL-SCNC: 103 MMOL/L (ref 98–107)
CO2 SERPL-SCNC: 30 MMOL/L (ref 21–32)
CREAT SERPL-MCNC: 0.72 MG/DL (ref 0.5–1.3)
EOSINOPHIL # BLD AUTO: 0.04 X10*3/UL (ref 0–0.4)
EOSINOPHIL NFR BLD AUTO: 0.8 %
ERYTHROCYTE [DISTWIDTH] IN BLOOD BY AUTOMATED COUNT: 12.6 % (ref 11.5–14.5)
GFR SERPL CREATININE-BSD FRML MDRD: >90 ML/MIN/1.73M*2
GLUCOSE SERPL-MCNC: 78 MG/DL (ref 74–99)
HCT VFR BLD AUTO: 38.2 % (ref 41–52)
HGB BLD-MCNC: 13.1 G/DL (ref 13.5–17.5)
IMM GRANULOCYTES # BLD AUTO: 0 X10*3/UL (ref 0–0.5)
IMM GRANULOCYTES NFR BLD AUTO: 0 % (ref 0–0.9)
LYMPHOCYTES # BLD AUTO: 1.65 X10*3/UL (ref 0.8–3)
LYMPHOCYTES NFR BLD AUTO: 33.8 %
MCH RBC QN AUTO: 31.8 PG (ref 26–34)
MCHC RBC AUTO-ENTMCNC: 34.3 G/DL (ref 32–36)
MCV RBC AUTO: 93 FL (ref 80–100)
MONOCYTES # BLD AUTO: 0.56 X10*3/UL (ref 0.05–0.8)
MONOCYTES NFR BLD AUTO: 11.5 %
NEUTROPHILS # BLD AUTO: 2.61 X10*3/UL (ref 1.6–5.5)
NEUTROPHILS NFR BLD AUTO: 53.5 %
NRBC BLD-RTO: 0 /100 WBCS (ref 0–0)
PLATELET # BLD AUTO: 162 X10*3/UL (ref 150–450)
POTASSIUM SERPL-SCNC: 4.2 MMOL/L (ref 3.5–5.3)
PROT SERPL-MCNC: 7.1 G/DL (ref 6.4–8.2)
RBC # BLD AUTO: 4.12 X10*6/UL (ref 4.5–5.9)
SODIUM SERPL-SCNC: 140 MMOL/L (ref 136–145)
WBC # BLD AUTO: 4.9 X10*3/UL (ref 4.4–11.3)

## 2023-12-08 PROCEDURE — 96366 THER/PROPH/DIAG IV INF ADDON: CPT | Mod: INF

## 2023-12-08 PROCEDURE — 96365 THER/PROPH/DIAG IV INF INIT: CPT | Mod: INF

## 2023-12-08 PROCEDURE — 36415 COLL VENOUS BLD VENIPUNCTURE: CPT

## 2023-12-08 PROCEDURE — 96375 TX/PRO/DX INJ NEW DRUG ADDON: CPT | Mod: INF

## 2023-12-08 PROCEDURE — 80053 COMPREHEN METABOLIC PANEL: CPT

## 2023-12-08 PROCEDURE — 2500000004 HC RX 250 GENERAL PHARMACY W/ HCPCS (ALT 636 FOR OP/ED): Performed by: PSYCHIATRY & NEUROLOGY

## 2023-12-08 PROCEDURE — 2500000001 HC RX 250 WO HCPCS SELF ADMINISTERED DRUGS (ALT 637 FOR MEDICARE OP): Performed by: PSYCHIATRY & NEUROLOGY

## 2023-12-08 PROCEDURE — 85025 COMPLETE CBC W/AUTO DIFF WBC: CPT

## 2023-12-08 RX ORDER — HEPARIN SODIUM,PORCINE/PF 10 UNIT/ML
50 SYRINGE (ML) INTRAVENOUS AS NEEDED
Status: CANCELLED | OUTPATIENT
Start: 2023-12-08

## 2023-12-08 RX ORDER — HEPARIN 100 UNIT/ML
500 SYRINGE INTRAVENOUS AS NEEDED
Status: DISCONTINUED | OUTPATIENT
Start: 2023-12-08 | End: 2023-12-08 | Stop reason: HOSPADM

## 2023-12-08 RX ORDER — HEPARIN 100 UNIT/ML
500 SYRINGE INTRAVENOUS AS NEEDED
Status: CANCELLED | OUTPATIENT
Start: 2023-12-08

## 2023-12-08 RX ORDER — DIPHENHYDRAMINE HCL 25 MG
25 CAPSULE ORAL ONCE
Status: COMPLETED | OUTPATIENT
Start: 2023-12-08 | End: 2023-12-08

## 2023-12-08 RX ORDER — FAMOTIDINE 10 MG/ML
20 INJECTION INTRAVENOUS ONCE AS NEEDED
Status: CANCELLED | OUTPATIENT
Start: 2023-12-29

## 2023-12-08 RX ORDER — ACETAMINOPHEN 325 MG/1
650 TABLET ORAL ONCE
Status: CANCELLED | OUTPATIENT
Start: 2023-12-29 | End: 2023-12-29

## 2023-12-08 RX ORDER — ACETAMINOPHEN 325 MG/1
650 TABLET ORAL ONCE
Status: COMPLETED | OUTPATIENT
Start: 2023-12-08 | End: 2023-12-08

## 2023-12-08 RX ORDER — ALBUTEROL SULFATE 0.83 MG/ML
3 SOLUTION RESPIRATORY (INHALATION) AS NEEDED
Status: CANCELLED | OUTPATIENT
Start: 2023-12-29

## 2023-12-08 RX ORDER — EPINEPHRINE 0.3 MG/.3ML
0.3 INJECTION SUBCUTANEOUS EVERY 5 MIN PRN
Status: CANCELLED | OUTPATIENT
Start: 2023-12-29

## 2023-12-08 RX ORDER — DIPHENHYDRAMINE HYDROCHLORIDE 50 MG/ML
50 INJECTION INTRAMUSCULAR; INTRAVENOUS AS NEEDED
Status: CANCELLED | OUTPATIENT
Start: 2023-12-29

## 2023-12-08 RX ORDER — DIPHENHYDRAMINE HCL 25 MG
25 CAPSULE ORAL ONCE
Status: CANCELLED | OUTPATIENT
Start: 2023-12-29 | End: 2023-12-29

## 2023-12-08 RX ADMIN — IMMUNE GLOBULIN (HUMAN) 70 G: 10 INJECTION INTRAVENOUS; SUBCUTANEOUS at 10:00

## 2023-12-08 RX ADMIN — METHYLPREDNISOLONE SODIUM SUCCINATE 100 MG: 125 INJECTION, POWDER, FOR SOLUTION INTRAMUSCULAR; INTRAVENOUS at 08:37

## 2023-12-08 RX ADMIN — ACETAMINOPHEN 650 MG: 325 TABLET ORAL at 08:29

## 2023-12-08 RX ADMIN — DIPHENHYDRAMINE HYDROCHLORIDE 25 MG: 25 CAPSULE ORAL at 08:29

## 2023-12-08 RX ADMIN — Medication 500 UNITS: at 13:43

## 2023-12-08 SDOH — ECONOMIC STABILITY: FOOD INSECURITY: WITHIN THE PAST 12 MONTHS, YOU WORRIED THAT YOUR FOOD WOULD RUN OUT BEFORE YOU GOT MONEY TO BUY MORE.: NEVER TRUE

## 2023-12-08 SDOH — ECONOMIC STABILITY: FOOD INSECURITY: WITHIN THE PAST 12 MONTHS, THE FOOD YOU BOUGHT JUST DIDN'T LAST AND YOU DIDN'T HAVE MONEY TO GET MORE.: NEVER TRUE

## 2023-12-08 ASSESSMENT — PATIENT HEALTH QUESTIONNAIRE - PHQ9
1. LITTLE INTEREST OR PLEASURE IN DOING THINGS: NOT AT ALL
SUM OF ALL RESPONSES TO PHQ9 QUESTIONS 1 AND 2: 0
2. FEELING DOWN, DEPRESSED OR HOPELESS: NOT AT ALL

## 2023-12-08 ASSESSMENT — ENCOUNTER SYMPTOMS
DEPRESSION: 0
LOSS OF SENSATION IN FEET: 1
OCCASIONAL FEELINGS OF UNSTEADINESS: 1

## 2023-12-08 ASSESSMENT — COLUMBIA-SUICIDE SEVERITY RATING SCALE - C-SSRS
6. HAVE YOU EVER DONE ANYTHING, STARTED TO DO ANYTHING, OR PREPARED TO DO ANYTHING TO END YOUR LIFE?: NO
1. IN THE PAST MONTH, HAVE YOU WISHED YOU WERE DEAD OR WISHED YOU COULD GO TO SLEEP AND NOT WAKE UP?: NO
2. HAVE YOU ACTUALLY HAD ANY THOUGHTS OF KILLING YOURSELF?: NO

## 2023-12-12 ENCOUNTER — TELEPHONE (OUTPATIENT)
Dept: GASTROENTEROLOGY | Facility: CLINIC | Age: 78
End: 2023-12-12
Payer: MEDICARE

## 2023-12-12 ENCOUNTER — ANCILLARY PROCEDURE (OUTPATIENT)
Dept: RADIOLOGY | Facility: CLINIC | Age: 78
End: 2023-12-12
Payer: MEDICARE

## 2023-12-12 ENCOUNTER — LAB (OUTPATIENT)
Dept: LAB | Facility: LAB | Age: 78
End: 2023-12-12
Payer: MEDICARE

## 2023-12-12 DIAGNOSIS — R19.4 CHANGE IN BOWEL HABITS: ICD-10-CM

## 2023-12-12 DIAGNOSIS — R19.4 CHANGE IN BOWEL HABITS: Primary | ICD-10-CM

## 2023-12-12 DIAGNOSIS — R19.7 DIARRHEA IN ADULT PATIENT: ICD-10-CM

## 2023-12-12 LAB — C DIF TOX TCDA+TCDB STL QL NAA+PROBE: NOT DETECTED

## 2023-12-12 PROCEDURE — 87328 CRYPTOSPORIDIUM AG IA: CPT

## 2023-12-12 PROCEDURE — 87329 GIARDIA AG IA: CPT

## 2023-12-12 PROCEDURE — 87506 IADNA-DNA/RNA PROBE TQ 6-11: CPT

## 2023-12-12 PROCEDURE — 82653 EL-1 FECAL QUANTITATIVE: CPT

## 2023-12-12 PROCEDURE — 74018 RADEX ABDOMEN 1 VIEW: CPT | Performed by: RADIOLOGY

## 2023-12-12 PROCEDURE — 74018 RADEX ABDOMEN 1 VIEW: CPT | Mod: FY

## 2023-12-12 PROCEDURE — 87493 C DIFF AMPLIFIED PROBE: CPT

## 2023-12-12 NOTE — TELEPHONE ENCOUNTER
Phone - wife - 3 week h/o loose stools, 1-4 stools daily, no blood - I rec: start evaluation with stool studies - no Miralax or docussate - will check KUB to r/o fecal retention

## 2023-12-12 NOTE — TELEPHONE ENCOUNTER
----- Message from Alexa Kendrick MA sent at 12/12/2023  9:03 AM EST -----  Loose bowels, lower abd pain x 3 weeks P# [phone]

## 2023-12-14 LAB

## 2023-12-14 NOTE — RESULT ENCOUNTER NOTE
I called patient, s/w him and his wife - KUB shows large amount of stool throughout colon - rec: Miralax 17g BID plus Fleet's enemas - call me next week with results

## 2023-12-15 ENCOUNTER — TELEMEDICINE (OUTPATIENT)
Dept: NEUROLOGY | Facility: CLINIC | Age: 78
End: 2023-12-15
Payer: MEDICARE

## 2023-12-15 DIAGNOSIS — G21.8 OTHER SECONDARY PARKINSONISM (MULTI): Primary | ICD-10-CM

## 2023-12-15 LAB
CRYPTOSP AG STL QL IA: NEGATIVE
G LAMBLIA AG STL QL IA: NEGATIVE

## 2023-12-15 PROCEDURE — 99214 OFFICE O/P EST MOD 30 MIN: CPT | Performed by: PSYCHIATRY & NEUROLOGY

## 2023-12-15 NOTE — PROGRESS NOTES
Subjective     Wade Montgomery is a 78 y.o. year old male here for virtual visit to discuss medication management for Parkinson's disease.    HPI  Patient is on with wife.  When he was taking Sinemet 2 tabs 3 times daily he did not notice any difference in his movements however he started having an episode of hallucinations where he saw someone at night was a shadow like feature and thought it was his son.  I did recommend at that time to lower his Sinemet to 1.5 tabs 3 times daily to help this.  This was 1 month ago.  He has had constipation, seeing GI now.  He stopped Sinemet due to GI issues 5 days ago.   Off Sinemet he feels slower, balance worse.   His DaTscan was positive.    Review of Systems    Patient Active Problem List   Diagnosis    Bladder cancer (CMS/HCC)    Chronic inflammatory demyelinating polyneuropathy (CMS/HCC)    Coronary artery disease involving native coronary artery of native heart without angina pectoris    Prostate cancer (CMS/HCC)    Unstable gait    Lung cancer (CMS/HCC)    Abnormal ejaculation    Abnormal stress test    Acquired involutional ptosis of both eyelids    Arthritis of knee, right    Astigmatism    Hyperopia    Baker cyst    Basal cell carcinoma of skin of right upper limb, including shoulder    Basal cell carcinoma of skin of other parts of face    Bilateral impacted cerumen    Bilateral partial vocal cord paralysis    Blepharitis, left eye    Calf swelling    Carcinoma in situ of skin of scalp and neck    Choroidal nevus of left eye    Dermatochalasis    Disturbance of skin sensation    CHAUDHARI (dyspnea on exertion)    Dysphagia    Eustachian tube dysfunction    Glossopharyngeal nerve disorder    Peripheral neuropathy    Glottic insufficiency    Ground glass opacity present on imaging of lung    Hemangioma of skin and subcutaneous tissue    Hematuria    Hemodynamic instability    History of bladder cancer    Personal history of other malignant neoplasm of skin    Hoarseness of  voice    Hypovolemia    IgM monoclonal gammopathy of uncertain significance    MGUS (monoclonal gammopathy of unknown significance)    Kappa light chain disease (CMS/HCC)    Left inguinal hernia    Lichen simplex chronicus    Lung nodule, multiple    Malignant neoplasm of lower lobe of left lung (CMS/HCC)    Melanocytic nevi of scalp and neck    Melanocytic nevi of trunk    Melanocytic nevi of unspecified lower limb, including hip    Melanocytic nevi of unspecified upper limb, including shoulder    Melanocytic nevi of other parts of face    Memory changes    Mixed hyperlipidemia    Movement disorder    Neoplasm of uncertain behavior of skin    Actinic keratosis    Inflamed seborrheic keratosis    Other seborrheic keratosis    Skin changes due to chronic exposure to nonionizing radiation, unspecified    Parkinsonism    PCO (posterior capsular opacification), right    Pseudophakia of both eyes    Scar condition and fibrosis of skin    Seborrheic dermatitis, unspecified    Sensorineural hearing loss, bilateral    Spinal stenosis of lumbar region at multiple levels    Spinal stenosis    SPK (superficial punctate keratitis)    Tongue fasciculation    Trigger finger, acquired    Trigger middle finger of right hand    Unspecified voice and resonance disorder    Unstable balance    Urinary retention    Weakness of both hands    Weakness of extremity    Xerosis cutis    Malignant neoplasm of overlapping sites of bladder (CMS/HCC)    Urothelial cancer (CMS/HCC)    SCC (squamous cell carcinoma)    Anemia    Chest pain    Chronic inflammatory demyelinating polyneuritis (CMS/HCC)    Polyneuropathy    Erectile disorder    Monoclonal gammopathy    Idiopathic progressive neuropathy    Secondary malignant neoplasm of left lung (CMS/HCC)    Other specified disorders of adrenal gland (CMS/HCC)     Past Medical History:   Diagnosis Date    Bicipital tendinitis, left shoulder 02/27/2014    Biceps tendonitis on left    Contact with and  (suspected) exposure to unspecified communicable disease 10/24/2019    Exposure to communicable disease    Dry eye syndrome of bilateral lacrimal glands     Dry eye syndrome of both eyes    Encounter for general adult medical examination without abnormal findings 08/19/2019    Initial Medicare annual wellness visit    Other chest pain 07/14/2015    Chest pain, muscular    Pain in left hip 04/20/2022    Left hip pain    Pain in left knee 04/20/2022    Left knee pain    Pelvic and perineal pain 08/19/2019    Suprapubic pressure    Personal history of other diseases of the circulatory system     History of subdural hematoma    Personal history of other diseases of the digestive system 05/10/2018    History of rectal bleeding    Personal history of other diseases of the musculoskeletal system and connective tissue 02/09/2022    History of low back pain    Personal history of other diseases of the respiratory system 02/27/2014    History of acute bronchitis    Personal history of other diseases of urinary system     History of hematuria    Personal history of other drug therapy     History of influenza vaccination    Personal history of other infectious and parasitic diseases 09/09/2019    History of tinea cruris    Personal history of other infectious and parasitic diseases 03/30/2017    History of tinea cruris    Personal history of other specified conditions 09/09/2020    History of flank pain    Personal history of other specified conditions 03/03/2020    History of nasal congestion    Personal history of other specified conditions 02/14/2022    History of dysuria    Personal history of other specified conditions 12/10/2019    History of epigastric pain    Personal history of urinary calculi 07/24/2019    History of urinary stone    Rash and other nonspecific skin eruption 01/06/2017    Rash    Retention of urine, unspecified 11/25/2020    Urinary retention with incomplete bladder emptying    Strain of muscle, fascia  and tendon of lower back, initial encounter 07/14/2015    Lumbar strain    Trochanteric bursitis, left hip 09/22/2021    Greater trochanteric bursitis of left hip     Past Surgical History:   Procedure Laterality Date    CT GUIDED PERCUTANEOUS BIOPSY LUNG  2/9/2022    CT GUIDED PERCUTANEOUS BIOPSY LUNG 2/9/2022 [facility]    OTHER SURGICAL HISTORY  02/13/2020    Hernia repair    OTHER SURGICAL HISTORY  02/13/2020    Knee surgery    OTHER SURGICAL HISTORY  02/13/2020    Trigger finger repair    OTHER SURGICAL HISTORY  01/13/2020    Craniotomy    OTHER SURGICAL HISTORY  01/13/2020    Carpal tunnel surgery    OTHER SURGICAL HISTORY  10/27/2020    Sinus surgery    OTHER SURGICAL HISTORY  02/16/2022    Cataract surgery    OTHER SURGICAL HISTORY  01/26/2021    Nerve biospy     Social History     Tobacco Use    Smoking status: Never     Passive exposure: Past    Smokeless tobacco: Never   Substance Use Topics    Alcohol use: Yes     Comment: rare     family history includes Dementia in his maternal grandmother and mother; Diabetes type I in his son; Heart attack in his father; Liver cancer in his father; Macular degeneration in his father; Scoliosis in his mother; peripheral neuropathy in his father.    Current Outpatient Medications:     acetaminophen (TYLENOL ORAL), if needed., Disp: , Rfl:     aspirin 81 mg EC tablet, Take 1 tablet (81 mg) by mouth once daily., Disp: , Rfl:     carboxymethylcellulose (Refresh Plus) 0.5 % ophthalmic solution, Administer into affected eye(s) 4 times a day., Disp: , Rfl:     diclofenac sodium (Voltaren) 1 % gel gel, Apply 1 Application topically 4 times a day.  APPLY TO LOWER EXTREMITIES. DO NOT APPLY MORE THAN 16 GM DAILY TO ANY ONE AFFECTED JOINT., Disp: , Rfl:     diphenhydramine HCl (BENADRYL ORAL), if needed., Disp: , Rfl:     docusate sodium (Colace) 100 mg capsule, Take 2 capsules (200 mg) by mouth once daily., Disp: , Rfl:     fluticasone (Flonase) 50 mcg/actuation nasal spray,  Administer 2 sprays into each nostril if needed., Disp: , Rfl:     ibuprofen (IBU) 600 mg tablet, Take 1 tablet (600 mg) by mouth every 6 hours if needed., Disp: , Rfl:     immune globul G-gly-IgA avg 46 (Gamunex-C) 20 gram/200 mL (10 %) solution, Infuse 70 gm divided over 1 days every 3 weeks x 6 months. Premedicate with 650 mg acetaminophen PO, 25 mg diphenhydramine PO, and 100 mg hydrocortisone IV push., Disp: , Rfl:     immune globulin, human, (Gammagard Liquid 10%) infusion, 70 unit(s) injectable every 3 weeks, Disp: , Rfl:     ketoconazole (NIZOral) 2 % shampoo, Ketoconazole 2 % External Shampoo Quantity: 120  Refills: 0  Start: 15-Sep-2021, Disp: , Rfl:     melatonin 5 mg tablet, Take by mouth., Disp: , Rfl:     multivitamin tablet, Take 1 tablet by mouth once daily. Take with food, Disp: , Rfl:     polyethylene glycol (Miralax) 17 gram/dose powder, Take by mouth., Disp: , Rfl:     predniSONE (Deltasone) 5 mg tablet, Take 1 tablet (5 mg) by mouth once daily., Disp: 90 tablet, Rfl: 3    Restasis 0.05 % ophthalmic emulsion, Administer into affected eye(s)., Disp: , Rfl:     rosuvastatin (Crestor) 5 mg tablet, Take 1 tablet (5 mg) by mouth once daily., Disp: 30 tablet, Rfl: 11  Allergies   Allergen Reactions    Oxycodone Other and Hallucinations     Psychosis    Penicillin V Other       Neurological Exam/Physical Exam:  alert, oriented. face symmetric. speech is clear, fluent.  appropriate, conversational.   moves all extremities above gravity.   Moderate / severe bradykinesia and abnormal posturing- of fingers with flexion of DIPs    Labs:  CBC:   Lab Results   Component Value Date    WBC 4.9 12/08/2023    HGB 13.1 (L) 12/08/2023    HCT 38.2 (L) 12/08/2023     12/08/2023     BMP:   Lab Results   Component Value Date     12/08/2023    K 4.2 12/08/2023     12/08/2023    CO2 30 12/08/2023    BUN 12 12/08/2023    CREATININE 0.72 12/08/2023    CALCIUM 9.3 12/08/2023    MG 2.01 12/28/2020    PHOS  2.4 (L) 12/28/2020     LFT:   Lab Results   Component Value Date    ALKPHOS 63 12/08/2023    BILITOT 0.7 12/08/2023    BILIDIR 0.3 12/28/2020    PROT 7.1 12/08/2023    ALBUMIN 3.9 12/08/2023    ALT 15 12/08/2023    AST 19 12/08/2023         Assessment/Plan   Problem List Items Addressed This Visit             ICD-10-CM    Parkinsonism - Primary G20.C    Restart sinemet once stable from GI standpoint up to 1.5 tabs TID and follow up.  Time spent 22 minutes

## 2023-12-15 NOTE — PATIENT INSTRUCTIONS
"It was a pleasure seeing you today.     Miralax, magnesium citrate, milk of magnesia and hydrate are helpful for controlling constipation.      Once ready from the GI perspective -  I would like you to start a medication called Sinemet (carbidopa-levadopa) 25-100mg start with 1/2 tab three times daily with meals (or small meal) x 5 days, then 1 tab three times daily x 5 days, then 1 1/2 tabs three times daily and continue. Some potential adverse effects are dizziness, nausea, confusion, or hallucinations-however, unlikely to occur at small doses like this.  This medication should help with symptoms like tremor, slow movements and stiffness.       Please keep your follow up appointment in March.    For any urgent issues or needing to speak to a medical assistant please call [phone], option 6 during our office hours Monday-Friday 8am-4pm, and leave a voicemail with your concern.  My office will try to reach back you as soon as possible within 24 (business) hours.  If you have an emergency please call 911 or visit a local urgent care or nearest emergency room.      Please understand that kooldiner is a useful communication tool for simple \"normal\" results or a refill request but I would not recommend using this tool for emergent or urgent issues or for conversations with me.  I am happy to ask my staff to rearrange a follow up visit or a virtual visit sooner than requested if appropriate for your care.    "

## 2023-12-17 LAB — ELASTASE PANC STL-MCNT: 636 UG/G

## 2023-12-18 LAB — O+P STL MICRO: NEGATIVE

## 2023-12-19 NOTE — RESULT ENCOUNTER NOTE
I called patient - reviewed stool studies - not suggestive of infection, IBD, or EPI - having larger, more complete BM's, taking Miralax - will continue, call me 7-10 days

## 2023-12-29 ENCOUNTER — INFUSION (OUTPATIENT)
Dept: INFUSION THERAPY | Facility: HOSPITAL | Age: 78
End: 2023-12-29
Payer: MEDICARE

## 2023-12-29 VITALS
TEMPERATURE: 98.9 F | RESPIRATION RATE: 18 BRPM | SYSTOLIC BLOOD PRESSURE: 113 MMHG | BODY MASS INDEX: 24.84 KG/M2 | OXYGEN SATURATION: 97 % | DIASTOLIC BLOOD PRESSURE: 75 MMHG | WEIGHT: 170.64 LBS | HEART RATE: 76 BPM

## 2023-12-29 DIAGNOSIS — G61.81 CHRONIC INFLAMMATORY DEMYELINATING POLYNEURITIS (MULTI): ICD-10-CM

## 2023-12-29 LAB
ALBUMIN SERPL BCP-MCNC: 3.8 G/DL (ref 3.4–5)
ALP SERPL-CCNC: 61 U/L (ref 33–136)
ALT SERPL W P-5'-P-CCNC: 14 U/L (ref 10–52)
ANION GAP SERPL CALC-SCNC: 9 MMOL/L (ref 10–20)
AST SERPL W P-5'-P-CCNC: 18 U/L (ref 9–39)
BASOPHILS # BLD AUTO: 0.02 X10*3/UL (ref 0–0.1)
BASOPHILS NFR BLD AUTO: 0.5 %
BILIRUB SERPL-MCNC: 0.6 MG/DL (ref 0–1.2)
BUN SERPL-MCNC: 14 MG/DL (ref 6–23)
CALCIUM SERPL-MCNC: 9.3 MG/DL (ref 8.6–10.3)
CHLORIDE SERPL-SCNC: 102 MMOL/L (ref 98–107)
CO2 SERPL-SCNC: 29 MMOL/L (ref 21–32)
CREAT SERPL-MCNC: 0.71 MG/DL (ref 0.5–1.3)
EOSINOPHIL # BLD AUTO: 0.03 X10*3/UL (ref 0–0.4)
EOSINOPHIL NFR BLD AUTO: 0.7 %
ERYTHROCYTE [DISTWIDTH] IN BLOOD BY AUTOMATED COUNT: 12.6 % (ref 11.5–14.5)
GFR SERPL CREATININE-BSD FRML MDRD: >90 ML/MIN/1.73M*2
GLUCOSE SERPL-MCNC: 82 MG/DL (ref 74–99)
HCT VFR BLD AUTO: 37.8 % (ref 41–52)
HGB BLD-MCNC: 12.8 G/DL (ref 13.5–17.5)
IMM GRANULOCYTES # BLD AUTO: 0.01 X10*3/UL (ref 0–0.5)
IMM GRANULOCYTES NFR BLD AUTO: 0.2 % (ref 0–0.9)
LYMPHOCYTES # BLD AUTO: 1.64 X10*3/UL (ref 0.8–3)
LYMPHOCYTES NFR BLD AUTO: 37 %
MCH RBC QN AUTO: 31.5 PG (ref 26–34)
MCHC RBC AUTO-ENTMCNC: 33.9 G/DL (ref 32–36)
MCV RBC AUTO: 93 FL (ref 80–100)
MONOCYTES # BLD AUTO: 0.58 X10*3/UL (ref 0.05–0.8)
MONOCYTES NFR BLD AUTO: 13.1 %
NEUTROPHILS # BLD AUTO: 2.15 X10*3/UL (ref 1.6–5.5)
NEUTROPHILS NFR BLD AUTO: 48.5 %
NRBC BLD-RTO: 0 /100 WBCS (ref 0–0)
PLATELET # BLD AUTO: 147 X10*3/UL (ref 150–450)
POTASSIUM SERPL-SCNC: 4 MMOL/L (ref 3.5–5.3)
PROT SERPL-MCNC: 7.1 G/DL (ref 6.4–8.2)
RBC # BLD AUTO: 4.06 X10*6/UL (ref 4.5–5.9)
SODIUM SERPL-SCNC: 136 MMOL/L (ref 136–145)
WBC # BLD AUTO: 4.4 X10*3/UL (ref 4.4–11.3)

## 2023-12-29 PROCEDURE — 85025 COMPLETE CBC W/AUTO DIFF WBC: CPT

## 2023-12-29 PROCEDURE — 96365 THER/PROPH/DIAG IV INF INIT: CPT | Mod: INF

## 2023-12-29 PROCEDURE — 96375 TX/PRO/DX INJ NEW DRUG ADDON: CPT | Mod: INF

## 2023-12-29 PROCEDURE — 82374 ASSAY BLOOD CARBON DIOXIDE: CPT

## 2023-12-29 PROCEDURE — 2500000001 HC RX 250 WO HCPCS SELF ADMINISTERED DRUGS (ALT 637 FOR MEDICARE OP): Performed by: PSYCHIATRY & NEUROLOGY

## 2023-12-29 PROCEDURE — 2500000004 HC RX 250 GENERAL PHARMACY W/ HCPCS (ALT 636 FOR OP/ED): Performed by: PSYCHIATRY & NEUROLOGY

## 2023-12-29 PROCEDURE — 96366 THER/PROPH/DIAG IV INF ADDON: CPT | Mod: INF

## 2023-12-29 RX ORDER — DIPHENHYDRAMINE HCL 25 MG
25 CAPSULE ORAL ONCE
Status: COMPLETED | OUTPATIENT
Start: 2023-12-29 | End: 2023-12-29

## 2023-12-29 RX ORDER — HEPARIN 100 UNIT/ML
500 SYRINGE INTRAVENOUS AS NEEDED
Status: DISCONTINUED | OUTPATIENT
Start: 2023-12-29 | End: 2023-12-29 | Stop reason: HOSPADM

## 2023-12-29 RX ORDER — ACETAMINOPHEN 325 MG/1
650 TABLET ORAL ONCE
Status: CANCELLED | OUTPATIENT
Start: 2023-12-29 | End: 2023-12-29

## 2023-12-29 RX ORDER — HEPARIN 100 UNIT/ML
500 SYRINGE INTRAVENOUS AS NEEDED
Status: CANCELLED | OUTPATIENT
Start: 2023-12-29

## 2023-12-29 RX ORDER — DIPHENHYDRAMINE HYDROCHLORIDE 50 MG/ML
50 INJECTION INTRAMUSCULAR; INTRAVENOUS AS NEEDED
Status: CANCELLED | OUTPATIENT
Start: 2023-12-29

## 2023-12-29 RX ORDER — ACETAMINOPHEN 325 MG/1
650 TABLET ORAL ONCE
Status: COMPLETED | OUTPATIENT
Start: 2023-12-29 | End: 2023-12-29

## 2023-12-29 RX ORDER — HEPARIN SODIUM,PORCINE/PF 10 UNIT/ML
50 SYRINGE (ML) INTRAVENOUS AS NEEDED
Status: CANCELLED | OUTPATIENT
Start: 2023-12-29

## 2023-12-29 RX ORDER — DIPHENHYDRAMINE HCL 25 MG
25 CAPSULE ORAL ONCE
Status: CANCELLED | OUTPATIENT
Start: 2023-12-29 | End: 2023-12-29

## 2023-12-29 RX ORDER — FAMOTIDINE 10 MG/ML
20 INJECTION INTRAVENOUS ONCE AS NEEDED
Status: CANCELLED | OUTPATIENT
Start: 2023-12-29

## 2023-12-29 RX ORDER — EPINEPHRINE 0.3 MG/.3ML
0.3 INJECTION SUBCUTANEOUS EVERY 5 MIN PRN
Status: CANCELLED | OUTPATIENT
Start: 2023-12-29

## 2023-12-29 RX ORDER — ALBUTEROL SULFATE 0.83 MG/ML
3 SOLUTION RESPIRATORY (INHALATION) AS NEEDED
Status: CANCELLED | OUTPATIENT
Start: 2023-12-29

## 2023-12-29 RX ADMIN — DIPHENHYDRAMINE HYDROCHLORIDE 25 MG: 25 CAPSULE ORAL at 08:30

## 2023-12-29 RX ADMIN — ACETAMINOPHEN 650 MG: 325 TABLET ORAL at 08:30

## 2023-12-29 RX ADMIN — Medication 500 UNITS: at 12:55

## 2023-12-29 RX ADMIN — METHYLPREDNISOLONE SODIUM SUCCINATE 100 MG: 125 INJECTION, POWDER, FOR SOLUTION INTRAMUSCULAR; INTRAVENOUS at 08:31

## 2023-12-29 RX ADMIN — IMMUNE GLOBULIN (HUMAN) 70 G: 10 INJECTION INTRAVENOUS; SUBCUTANEOUS at 09:15

## 2023-12-29 ASSESSMENT — ENCOUNTER SYMPTOMS
OCCASIONAL FEELINGS OF UNSTEADINESS: 1
DEPRESSION: 0
LOSS OF SENSATION IN FEET: 1

## 2023-12-29 ASSESSMENT — COLUMBIA-SUICIDE SEVERITY RATING SCALE - C-SSRS
2. HAVE YOU ACTUALLY HAD ANY THOUGHTS OF KILLING YOURSELF?: NO
6. HAVE YOU EVER DONE ANYTHING, STARTED TO DO ANYTHING, OR PREPARED TO DO ANYTHING TO END YOUR LIFE?: NO
1. IN THE PAST MONTH, HAVE YOU WISHED YOU WERE DEAD OR WISHED YOU COULD GO TO SLEEP AND NOT WAKE UP?: NO

## 2023-12-29 ASSESSMENT — PAIN SCALES - GENERAL: PAINLEVEL: 0-NO PAIN

## 2024-01-08 ENCOUNTER — TELEPHONE (OUTPATIENT)
Dept: GASTROENTEROLOGY | Facility: CLINIC | Age: 79
End: 2024-01-08
Payer: MEDICARE

## 2024-01-08 NOTE — TELEPHONE ENCOUNTER
----- Message from Alexa Kendrick MA sent at 1/8/2024  9:39 AM EST -----  Still having bowel issues [phone]

## 2024-01-08 NOTE — TELEPHONE ENCOUNTER
Phone - hard stools on Miralax 17g daily, and too loose on BID - will take one dose each AM, and take addl. Doses later in day prn

## 2024-01-10 ENCOUNTER — TELEMEDICINE (OUTPATIENT)
Dept: NEUROLOGY | Facility: HOSPITAL | Age: 79
End: 2024-01-10
Payer: MEDICARE

## 2024-01-10 DIAGNOSIS — G61.81 CHRONIC INFLAMMATORY DEMYELINATING NEUROPATHY (MULTI): Primary | ICD-10-CM

## 2024-01-10 DIAGNOSIS — G61.81 CHRONIC INFLAMMATORY DEMYELINATING POLYNEURITIS (MULTI): Primary | ICD-10-CM

## 2024-01-10 PROCEDURE — 99213 OFFICE O/P EST LOW 20 MIN: CPT | Performed by: PSYCHIATRY & NEUROLOGY

## 2024-01-10 PROCEDURE — 99213 OFFICE O/P EST LOW 20 MIN: CPT | Mod: GT,95 | Performed by: PSYCHIATRY & NEUROLOGY

## 2024-01-10 RX ORDER — DIPHENHYDRAMINE HCL 25 MG
25 CAPSULE ORAL ONCE
Status: CANCELLED | OUTPATIENT
Start: 2024-01-10 | End: 2024-01-10

## 2024-01-10 RX ORDER — METHYLPREDNISOLONE SODIUM SUCCINATE 125 MG/2ML
100 INJECTION INTRAMUSCULAR; INTRAVENOUS ONCE
Status: CANCELLED
Start: 2024-01-10 | End: 2024-01-10

## 2024-01-10 RX ORDER — ACETAMINOPHEN 325 MG/1
650 TABLET ORAL ONCE
Status: CANCELLED | OUTPATIENT
Start: 2024-01-10 | End: 2024-01-10

## 2024-01-10 NOTE — PROGRESS NOTES
Virtual or Telephone Consent    An interactive audio and video telecommunication system which permits real time communications between the patient (at the originating site) and provider (at the distant site) was utilized to provide this telehealth service.   Verbal consent was requested and obtained from Wade Montgomery on this date, 01/23/24 for a telehealth visit.  Wife on video visit as well.       Date of Service: 1/10/2024  Patient: Wade Montgomery  MRN: [id number]  Referring Provider: No ref. provider found  Primary Care Physician: Ileana Bro,      History of Present Illness:    Mr. Montgomery is a 78 y.o. 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    Uses a rollator when walking outside.    He also has a history of Parkinsonism and has been on trial of Sinemet. However, he is currently off of Sinemet and working through GI issues;     Wife reports the smaller dose of Sinemet seemed to help somewhat with Parkinson's diagnosis.      Last surveillance scan for lung cancer was done in October 2023 with plan for follow-up in 6 months.     Has some tingling and numbness in the feet and hands--fingertips to the wrist; from toes to the ankle area. The IVIG continues to help him significantly with his strength and sensory symptoms.     He continues the exercises and he does breathing exercises as well.      Problem List Items Addressed This Visit    None      Allergies   Allergen Reactions    Oxycodone Other and Hallucinations     Psychosis    Penicillin V Other        Medications:    Current Outpatient Medications:     acetaminophen (TYLENOL ORAL), if needed., Disp: , Rfl:     aspirin 81 mg EC tablet, Take 1 tablet (81 mg) by mouth once daily., Disp: , Rfl:     carboxymethylcellulose (Refresh Plus) 0.5 % ophthalmic solution, Administer into affected eye(s) 4 times a day., Disp: , Rfl:     diclofenac sodium (Voltaren) 1 % gel gel, Apply 1 Application  topically 4 times a day.  APPLY TO LOWER EXTREMITIES. DO NOT APPLY MORE THAN 16 GM DAILY TO ANY ONE AFFECTED JOINT., Disp: , Rfl:     diphenhydramine HCl (BENADRYL ORAL), if needed., Disp: , Rfl:     docusate sodium (Colace) 100 mg capsule, Take 2 capsules (200 mg) by mouth once daily., Disp: , Rfl:     fluticasone (Flonase) 50 mcg/actuation nasal spray, Administer 2 sprays into each nostril if needed., Disp: , Rfl:     ibuprofen 600 mg tablet, Take 1 tablet (600 mg) by mouth every 6 hours if needed. States takes 200 mg, Disp: , Rfl:     immune globul G-gly-IgA avg 46 (Gamunex-C) 20 gram/200 mL (10 %) solution, Infuse 70 gm divided over 1 days every 3 weeks x 6 months. Premedicate with 650 mg acetaminophen PO, 25 mg diphenhydramine PO, and 100 mg hydrocortisone IV push., Disp: , Rfl:     immune globulin, human, (Gammagard Liquid 10%) infusion, 70 unit(s) injectable every 3 weeks, Disp: , Rfl:     ketoconazole (NIZOral) 2 % shampoo, Ketoconazole 2 % External Shampoo Quantity: 120  Refills: 0  Start: 15-Sep-2021, Disp: , Rfl:     melatonin 5 mg tablet, Take by mouth., Disp: , Rfl:     multivitamin tablet, Take 1 tablet by mouth once daily. Take with food, Disp: , Rfl:     polyethylene glycol (Miralax) 17 gram/dose powder, Take by mouth., Disp: , Rfl:     predniSONE (Deltasone) 5 mg tablet, Take 1 tablet (5 mg) by mouth once daily., Disp: 90 tablet, Rfl: 3    Restasis 0.05 % ophthalmic emulsion, Administer into affected eye(s)., Disp: , Rfl:     rosuvastatin (Crestor) 5 mg tablet, Take 1 tablet (5 mg) by mouth once daily., Disp: 30 tablet, Rfl: 11     Physical Exam:     There were no vitals taken for this visit.    Brief Neurological Exam:  Can bring both arms up, stand without assistance    Per wife who helps to do neuro exam--his strength is stronger than worse baseline when she could overcome his strength to as it is currently where he is stronger than she is.      Results:     The following labs, imaging, and  "results were personally reviewed and demonstrated:    Labs:  Lab Results   Component Value Date    HGBA1C 5.1 02/13/2020       Lab Results   Component Value Date    MEKYREQU28 627 08/19/2019     VITAMIN D: No components found for: \"D25OHT\"  COPPER: No results found for: \"COPPER\"  ZINC: No components found for: \"ZINCLEVEL\"  B6: [ ]  FOLIC ACID: No components found for: \"FOLATELEVEL\"  IRON STUDIES: No results found for: \"IRON\", \"TIBC\", \"FERRITIN\"  MAGNESIUM: No components found for: \"MAGNESIUM\"  No components found for: \"THRYOIDSTIMU\"  No results found for: \"BALTAZAR\", \"ANATITER\"  No results found for: \"CKTOTAL\"    Lab Results   Component Value Date    SPEP ABNORMAL 07/28/2023     CBC:   Lab Results   Component Value Date    WBC 4.4 12/29/2023    HGB 12.8 (L) 12/29/2023    HCT 37.8 (L) 12/29/2023     (L) 12/29/2023     BMP:   Lab Results   Component Value Date     12/29/2023    K 4.0 12/29/2023     12/29/2023    CO2 29 12/29/2023    BUN 14 12/29/2023    CREATININE 0.71 12/29/2023    CALCIUM 9.3 12/29/2023    MG 2.01 12/28/2020    PHOS 2.4 (L) 12/28/2020     LFT:   Lab Results   Component Value Date    ALKPHOS 61 12/29/2023    BILITOT 0.6 12/29/2023    BILIDIR 0.3 12/28/2020    PROT 7.1 12/29/2023    ALBUMIN 3.8 12/29/2023    ALT 14 12/29/2023    AST 18 12/29/2023         Impression/Plan:     Impression:  Wade Montgomery is a 78 y.o. who presents for follow-up of CIDP. His strength and numbness continue to improve with obtaining maintenance IVIG. When we have stopped the IVIG in the past, his symptoms have worsened.     Plan:    -- continue IVIG    Reviewed and approved by BRIDGET SOTO on 1/10/24 at 10:41 AM.    I personally spent 20 minutes on the day of the visit completing the review of the medical record and outside records, obtaining history and performing an appropriate physical exam, patient care, counseling and education, placing orders, independently reviewing results, communicating with the " patient/family and other providers, coordinating care and performing appropriate clinical documentation.

## 2024-01-12 DIAGNOSIS — G61.81 CHRONIC INFLAMMATORY DEMYELINATING POLYNEURITIS (MULTI): Primary | ICD-10-CM

## 2024-01-19 ENCOUNTER — APPOINTMENT (OUTPATIENT)
Dept: INFUSION THERAPY | Facility: HOSPITAL | Age: 79
End: 2024-01-19
Payer: MEDICARE

## 2024-01-23 ENCOUNTER — TELEMEDICINE (OUTPATIENT)
Dept: OTOLARYNGOLOGY | Facility: CLINIC | Age: 79
End: 2024-01-23
Payer: MEDICARE

## 2024-01-23 ENCOUNTER — INFUSION (OUTPATIENT)
Dept: INFUSION THERAPY | Facility: HOSPITAL | Age: 79
End: 2024-01-23
Payer: MEDICARE

## 2024-01-23 VITALS
BODY MASS INDEX: 24.64 KG/M2 | SYSTOLIC BLOOD PRESSURE: 149 MMHG | HEART RATE: 94 BPM | TEMPERATURE: 97.2 F | OXYGEN SATURATION: 99 % | RESPIRATION RATE: 18 BRPM | DIASTOLIC BLOOD PRESSURE: 95 MMHG | WEIGHT: 169.31 LBS

## 2024-01-23 DIAGNOSIS — G61.81 CHRONIC INFLAMMATORY DEMYELINATING POLYNEURITIS (MULTI): ICD-10-CM

## 2024-01-23 DIAGNOSIS — J38.02 BILATERAL PARTIAL VOCAL CORD PARALYSIS: Primary | ICD-10-CM

## 2024-01-23 DIAGNOSIS — R13.12 OROPHARYNGEAL DYSPHAGIA: ICD-10-CM

## 2024-01-23 DIAGNOSIS — R49.0 HOARSENESS OF VOICE: ICD-10-CM

## 2024-01-23 LAB
ALBUMIN SERPL BCP-MCNC: 4 G/DL (ref 3.4–5)
ALP SERPL-CCNC: 65 U/L (ref 33–136)
ALT SERPL W P-5'-P-CCNC: 6 U/L (ref 10–52)
ANION GAP SERPL CALC-SCNC: 8 MMOL/L (ref 10–20)
AST SERPL W P-5'-P-CCNC: 18 U/L (ref 9–39)
BASOPHILS # BLD AUTO: 0.01 X10*3/UL (ref 0–0.1)
BASOPHILS NFR BLD AUTO: 0.1 %
BILIRUB SERPL-MCNC: 0.5 MG/DL (ref 0–1.2)
BUN SERPL-MCNC: 12 MG/DL (ref 6–23)
CALCIUM SERPL-MCNC: 9.3 MG/DL (ref 8.6–10.3)
CHLORIDE SERPL-SCNC: 103 MMOL/L (ref 98–107)
CO2 SERPL-SCNC: 29 MMOL/L (ref 21–32)
CREAT SERPL-MCNC: 0.81 MG/DL (ref 0.5–1.3)
EGFRCR SERPLBLD CKD-EPI 2021: >90 ML/MIN/1.73M*2
EOSINOPHIL # BLD AUTO: 0.03 X10*3/UL (ref 0–0.4)
EOSINOPHIL NFR BLD AUTO: 0.4 %
ERYTHROCYTE [DISTWIDTH] IN BLOOD BY AUTOMATED COUNT: 12.7 % (ref 11.5–14.5)
GLUCOSE SERPL-MCNC: 112 MG/DL (ref 74–99)
HCT VFR BLD AUTO: 39.1 % (ref 41–52)
HGB BLD-MCNC: 13.1 G/DL (ref 13.5–17.5)
IMM GRANULOCYTES # BLD AUTO: 0.01 X10*3/UL (ref 0–0.5)
IMM GRANULOCYTES NFR BLD AUTO: 0.1 % (ref 0–0.9)
LYMPHOCYTES # BLD AUTO: 1.31 X10*3/UL (ref 0.8–3)
LYMPHOCYTES NFR BLD AUTO: 18.9 %
MCH RBC QN AUTO: 31.1 PG (ref 26–34)
MCHC RBC AUTO-ENTMCNC: 33.5 G/DL (ref 32–36)
MCV RBC AUTO: 93 FL (ref 80–100)
MONOCYTES # BLD AUTO: 0.65 X10*3/UL (ref 0.05–0.8)
MONOCYTES NFR BLD AUTO: 9.4 %
NEUTROPHILS # BLD AUTO: 4.93 X10*3/UL (ref 1.6–5.5)
NEUTROPHILS NFR BLD AUTO: 71.1 %
NRBC BLD-RTO: 0 /100 WBCS (ref 0–0)
PLATELET # BLD AUTO: 172 X10*3/UL (ref 150–450)
POTASSIUM SERPL-SCNC: 4 MMOL/L (ref 3.5–5.3)
PROT SERPL-MCNC: 7.4 G/DL (ref 6.4–8.2)
RBC # BLD AUTO: 4.21 X10*6/UL (ref 4.5–5.9)
SODIUM SERPL-SCNC: 136 MMOL/L (ref 136–145)
WBC # BLD AUTO: 6.9 X10*3/UL (ref 4.4–11.3)

## 2024-01-23 PROCEDURE — 2500000001 HC RX 250 WO HCPCS SELF ADMINISTERED DRUGS (ALT 637 FOR MEDICARE OP): Performed by: PSYCHIATRY & NEUROLOGY

## 2024-01-23 PROCEDURE — 96365 THER/PROPH/DIAG IV INF INIT: CPT | Mod: INF

## 2024-01-23 PROCEDURE — 96366 THER/PROPH/DIAG IV INF ADDON: CPT | Mod: INF

## 2024-01-23 PROCEDURE — 2500000004 HC RX 250 GENERAL PHARMACY W/ HCPCS (ALT 636 FOR OP/ED): Performed by: PSYCHIATRY & NEUROLOGY

## 2024-01-23 PROCEDURE — 85025 COMPLETE CBC W/AUTO DIFF WBC: CPT

## 2024-01-23 PROCEDURE — 80053 COMPREHEN METABOLIC PANEL: CPT

## 2024-01-23 PROCEDURE — 96374 THER/PROPH/DIAG INJ IV PUSH: CPT | Mod: INF

## 2024-01-23 PROCEDURE — 99213 OFFICE O/P EST LOW 20 MIN: CPT | Performed by: OTOLARYNGOLOGY

## 2024-01-23 RX ORDER — ACETAMINOPHEN 325 MG/1
650 TABLET ORAL ONCE
Status: CANCELLED | OUTPATIENT
Start: 2024-02-09 | End: 2024-02-09

## 2024-01-23 RX ORDER — DIPHENHYDRAMINE HCL 25 MG
25 CAPSULE ORAL ONCE
Status: COMPLETED | OUTPATIENT
Start: 2024-01-23 | End: 2024-01-23

## 2024-01-23 RX ORDER — HEPARIN 100 UNIT/ML
500 SYRINGE INTRAVENOUS AS NEEDED
Status: CANCELLED | OUTPATIENT
Start: 2024-01-23

## 2024-01-23 RX ORDER — HEPARIN 100 UNIT/ML
500 SYRINGE INTRAVENOUS AS NEEDED
Status: DISCONTINUED | OUTPATIENT
Start: 2024-01-23 | End: 2024-01-23 | Stop reason: HOSPADM

## 2024-01-23 RX ORDER — HEPARIN SODIUM,PORCINE/PF 10 UNIT/ML
50 SYRINGE (ML) INTRAVENOUS AS NEEDED
Status: DISCONTINUED | OUTPATIENT
Start: 2024-01-23 | End: 2024-01-23 | Stop reason: SDUPTHER

## 2024-01-23 RX ORDER — DIPHENHYDRAMINE HCL 25 MG
25 CAPSULE ORAL ONCE
Status: CANCELLED | OUTPATIENT
Start: 2024-02-09 | End: 2024-02-09

## 2024-01-23 RX ORDER — ACETAMINOPHEN 325 MG/1
650 TABLET ORAL ONCE
Status: COMPLETED | OUTPATIENT
Start: 2024-01-23 | End: 2024-01-23

## 2024-01-23 RX ORDER — CARBIDOPA AND LEVODOPA 25; 100 MG/1; MG/1
1.5 TABLET, ORALLY DISINTEGRATING ORAL 3 TIMES DAILY
COMMUNITY
End: 2024-03-21 | Stop reason: WASHOUT

## 2024-01-23 RX ORDER — HEPARIN SODIUM,PORCINE/PF 10 UNIT/ML
50 SYRINGE (ML) INTRAVENOUS AS NEEDED
Status: CANCELLED | OUTPATIENT
Start: 2024-01-23

## 2024-01-23 RX ADMIN — METHYLPREDNISOLONE SODIUM SUCCINATE 100 MG: 125 INJECTION, POWDER, FOR SOLUTION INTRAMUSCULAR; INTRAVENOUS at 08:58

## 2024-01-23 RX ADMIN — IMMUNE GLOBULIN (HUMAN) 70 G: 10 INJECTION INTRAVENOUS; SUBCUTANEOUS at 08:45

## 2024-01-23 RX ADMIN — Medication 500 UNITS: at 13:28

## 2024-01-23 RX ADMIN — DIPHENHYDRAMINE HYDROCHLORIDE 25 MG: 25 CAPSULE ORAL at 08:50

## 2024-01-23 RX ADMIN — ACETAMINOPHEN 650 MG: 325 TABLET ORAL at 08:51

## 2024-01-23 SDOH — ECONOMIC STABILITY: FOOD INSECURITY: WITHIN THE PAST 12 MONTHS, THE FOOD YOU BOUGHT JUST DIDN'T LAST AND YOU DIDN'T HAVE MONEY TO GET MORE.: NEVER TRUE

## 2024-01-23 SDOH — ECONOMIC STABILITY: FOOD INSECURITY: WITHIN THE PAST 12 MONTHS, YOU WORRIED THAT YOUR FOOD WOULD RUN OUT BEFORE YOU GOT MONEY TO BUY MORE.: NEVER TRUE

## 2024-01-23 ASSESSMENT — ENCOUNTER SYMPTOMS
DEPRESSION: 0
LOSS OF SENSATION IN FEET: 1
OCCASIONAL FEELINGS OF UNSTEADINESS: 1

## 2024-01-23 ASSESSMENT — COLUMBIA-SUICIDE SEVERITY RATING SCALE - C-SSRS
1. IN THE PAST MONTH, HAVE YOU WISHED YOU WERE DEAD OR WISHED YOU COULD GO TO SLEEP AND NOT WAKE UP?: NO
2. HAVE YOU ACTUALLY HAD ANY THOUGHTS OF KILLING YOURSELF?: NO
6. HAVE YOU EVER DONE ANYTHING, STARTED TO DO ANYTHING, OR PREPARED TO DO ANYTHING TO END YOUR LIFE?: NO

## 2024-01-23 ASSESSMENT — PAIN SCALES - GENERAL: PAINLEVEL: 0-NO PAIN

## 2024-01-23 NOTE — PROGRESS NOTES
Chief Complaint  An interactive audio and video telecommunication system which permits real time communications between the patient (at the originating site) and provider (at the distant site) was utilized to provide this telehealth service.         Pertinent History:  Dysphagia and hoarseness underwent speech therapy and noted improvement in voice and swallow.     Interval History (06/2023):   He reports that his swallow has improved. He is able to generalize the speech strategy. He is gaining weight well.   Hi voice is stable. He has mild fatigue as he received his IVIG infusion today. He is using his re breather.   He underwent a repeat CT chest that showed stable lung nodules.     Exam:  VOICE: Mild hoarseness.  RESPIRATION: Breathing comfortably, no stridor.    ORAL CAVITY/OROPHARYNX/LIPS: Normal mucous membranes, normal floor of mouth/tongue/OP, no masses or lesions are noted.    SKIN: Neck skin is without scar or injury.    PSYCH: Alert and oriented with appropriate mood and affect.       Assessment and Plan:  This is a follow up visit for with clinical findings of weak voice, coughing/choking with food. He underwent speech therapy for swallow and voice and notes improvement in his swallow and voice.     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 These are managed with IVIG and Carbidopa.     We discussed:  He will follow up with me expectantly.     The patient's questions were answered.      Scribe Attestation  By signing my name below, Cristina STANLEY , Scribjeannie attest that this documentation has been prepared under the direction and in the presence of Gail Calvo MD.

## 2024-01-31 ENCOUNTER — TELEPHONE (OUTPATIENT)
Dept: OPHTHALMOLOGY | Facility: CLINIC | Age: 79
End: 2024-01-31
Payer: MEDICARE

## 2024-01-31 DIAGNOSIS — H16.223 KERATOCONJUNCTIVITIS SICCA OF BOTH EYES NOT SPECIFIED AS SJOGREN'S: Primary | ICD-10-CM

## 2024-01-31 RX ORDER — CYCLOSPORINE 0.5 MG/ML
1 EMULSION OPHTHALMIC EVERY 12 HOURS
Qty: 180 EACH | Refills: 3 | Status: SHIPPED | OUTPATIENT
Start: 2024-01-31

## 2024-02-09 ENCOUNTER — APPOINTMENT (OUTPATIENT)
Dept: INFUSION THERAPY | Facility: HOSPITAL | Age: 79
End: 2024-02-09
Payer: MEDICARE

## 2024-02-12 ENCOUNTER — APPOINTMENT (OUTPATIENT)
Dept: LAB | Facility: HOSPITAL | Age: 79
End: 2024-02-12
Payer: MEDICARE

## 2024-02-12 ENCOUNTER — OFFICE VISIT (OUTPATIENT)
Dept: HEMATOLOGY/ONCOLOGY | Facility: CLINIC | Age: 79
End: 2024-02-12
Payer: MEDICARE

## 2024-02-12 VITALS
SYSTOLIC BLOOD PRESSURE: 134 MMHG | TEMPERATURE: 97.9 F | BODY MASS INDEX: 25.03 KG/M2 | RESPIRATION RATE: 16 BRPM | DIASTOLIC BLOOD PRESSURE: 87 MMHG | HEIGHT: 69 IN | OXYGEN SATURATION: 95 % | WEIGHT: 168.98 LBS | HEART RATE: 82 BPM

## 2024-02-12 DIAGNOSIS — D47.2 MGUS (MONOCLONAL GAMMOPATHY OF UNKNOWN SIGNIFICANCE): Primary | ICD-10-CM

## 2024-02-12 LAB
IGG SERPL-MCNC: 1750 MG/DL (ref 700–1600)
PROT SERPL-MCNC: 7.5 G/DL (ref 6.4–8.2)

## 2024-02-12 PROCEDURE — 1160F RVW MEDS BY RX/DR IN RCRD: CPT | Performed by: INTERNAL MEDICINE

## 2024-02-12 PROCEDURE — 86334 IMMUNOFIX E-PHORESIS SERUM: CPT | Mod: PORLAB | Performed by: INTERNAL MEDICINE

## 2024-02-12 PROCEDURE — 1036F TOBACCO NON-USER: CPT | Performed by: INTERNAL MEDICINE

## 2024-02-12 PROCEDURE — 1157F ADVNC CARE PLAN IN RCRD: CPT | Performed by: INTERNAL MEDICINE

## 2024-02-12 PROCEDURE — 83521 IG LIGHT CHAINS FREE EACH: CPT | Mod: PORLAB | Performed by: INTERNAL MEDICINE

## 2024-02-12 PROCEDURE — 1126F AMNT PAIN NOTED NONE PRSNT: CPT | Performed by: INTERNAL MEDICINE

## 2024-02-12 PROCEDURE — 86320 SERUM IMMUNOELECTROPHORESIS: CPT | Performed by: INTERNAL MEDICINE

## 2024-02-12 PROCEDURE — 84165 PROTEIN E-PHORESIS SERUM: CPT | Performed by: INTERNAL MEDICINE

## 2024-02-12 PROCEDURE — 99213 OFFICE O/P EST LOW 20 MIN: CPT | Performed by: INTERNAL MEDICINE

## 2024-02-12 PROCEDURE — 82784 ASSAY IGA/IGD/IGG/IGM EACH: CPT | Mod: PORLAB | Performed by: INTERNAL MEDICINE

## 2024-02-12 PROCEDURE — 84155 ASSAY OF PROTEIN SERUM: CPT | Mod: PORLAB | Performed by: INTERNAL MEDICINE

## 2024-02-12 PROCEDURE — 1159F MED LIST DOCD IN RCRD: CPT | Performed by: INTERNAL MEDICINE

## 2024-02-12 ASSESSMENT — NCCN CANCER DISTRESS MANAGEMENT: NCCN PRACTICAL CONCERNS: 11

## 2024-02-12 ASSESSMENT — PAIN SCALES - GENERAL: PAINLEVEL: 0-NO PAIN

## 2024-02-12 NOTE — PATIENT INSTRUCTIONS
Follow up with Dr. Giraldo in 6 months.     Please come in at least one week prior to next appointment to have blood work completed.

## 2024-02-12 NOTE — PROGRESS NOTES
"  Patient Visit Information:   Visit Type: Follow Up Visit   I am aggressive  History of Present Illness:      ID Statement:    MITCHELL COLE is a 78 year old Male        Interval History:    2/12/24  Mr. Cole returns today for follow-up of MGUS.  Since his last visit,   Hemoglobin, renal function have been stable no any other complaint.    He has some generalized weakness and fatigue, but denies any fever, chills, increased  shortness of breath, worsening neurologic symptoms, new musculoskeletal pain or other significant symptoms.  Appetite is good.  He is gaining weight.  Patient is going to have a CT of chest for pulmonary nodule.  Past medical history: IgM MGUS : Work-up initiated in February 2020 for polyneuropathy showed elevated kappa/lambda light chain ratio of 2.58.  Urine ROSA MARIA negative.   5/27/2020 K/L ratio 2.69, normal IgG, IgA and IgM levels, SPEP/ROSA MARIA showed M protein 0.2 g/dL.  He was followed clinically.  Bone marrow biopsy not indicated.  12/7/2020 K/L ratio 2.04.  M protein 0.2 g/dL.   12/6/2021: K/L ratio slightly higher at 4.16.  M protein stable at 0.2 g/dL.  Normal CBC, creatinine, calcium.  6/23/2022: K/Lratio 3.95.  IgM stable 232.  M protein stable at 0.2 g/dL.  1/30/2023: SPEP/ROSA MARIA: Small, stable M protein 0.2 g/dL.  Kappa/lambda ratio slightly higher at 5.4.  (IgM level not done?)  July 28, 2023, M protein 0.2 g per DL, IgG 1650, kappa light chain 5.43, lambda light chain 1.04, ratio 5.22  Non-small cell lung cancer:  CT chest done12/2021 showed 1.4 cm LLL lesion.  PET scan done in January 2022 showed hypermetabolic activity  in the LLL lesion only.  CT-guided biopsy done 2/9/2022 showed non-small cell carcinoma.  Because of his comorbidities, he had SBRT completing his fifth fraction on 3/21/2022 without complication.  9/29/2022: CT chest showed \"new\" subcentimeter pulmonary  nodules of uncertain significance.  11/18/2022: CT chest showed left lung density consistent with radiation " pneumonitis, small subcentimeter nodules.  He was seen by medical oncologist, Dr. Mcgill and by his radiation oncologist, Dr. Fitzgerald.  Follow-up  scans are scheduled for 2/13/2023.  Hx. CIDP (chronic inflammatory demyelinating polyneuropathy), symptoms began 2008.  Sural nerve biopsy negative, negative anti-MAG Ab test; not attributed to MGUS. Receiving IVIG every 3 weeks.   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  History of cataract surgery, subdural hematoma, craniotomy, sinus surgery, hernia repair, knee surgery, carpal  tunnel surgery, spinal stenosis, trigger finger surgery, ED. COVID May 2022.  Non-small cell lung cancer LLL diagnosed February 2022, status post SBRT completed March 2022.     Social history: Never smoker. Nondrinker.  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  Was exposed to carbon and other exhaust.   No significant secondhand smoke exposure.     Review of Systems:   Review of Systems:    Constitutional: No fever, chills, night sweats.  Mild fatigue.  Head and neck: No headaches or dizziness.  No pain, stiffness.   HEENT: No sore throat, sinusitis.  Hearing is adequate. Vison is adequate.   Cardiac: No chest pain, palpitations, lightheadedness.   Respiratory: No increased dyspnea, cough, hemoptysis.   GI: Appetite is good, weight stable.  Occasional, mild constipation, but no diarrhea, change in bowel movements, melena, hematochezia. No abdominal pain, nausea, vomiting.   Genitourinary: No frequency, urgency.  No polyuria, dysuria, hematuria. s/p cystoprostatectomy, urinary diversion/ureterostomy.  Musculoskeletal: No worsening bone or joint pain.  Chronic arthralgias.  Endocrine: No diabetes, thyroid disease.   Skin: No rash, skin lesions, itching.   Neuromuscular: No lightheadedness, dizziness.  No history of seizure.  Motor and sensory deficit due to neuromuscular  disorder/polyneuropathy.                 Allergies and Intolerances:       Allergies:         penicillin: Drug, Unknown, Active         oxycodone: Drug, Psychosis, Active     Outpatient Medication Profile:  * Patient Currently Takes Medications as of 10-Aug-2023 10:54 documented in Structured Notes         Colace 100 mg oral capsule : Last Dose Taken:  , 1 cap(s) orally 2 times a day, As Needed, Start Date: 07-Jul-2022         multivitamin Multiple Vitamins oral tablet: Last Dose Taken:  , 1 tab(s)  oral once a day         Melatonin 5 mg oral tablet: Last Dose Taken:  , 1 tab(s) orally once  a day (at bedtime) as needed         Aspir 81 oral delayed release tablet: Last Dose Taken:  , 1 tab(s) orally  once a day         fluticasone nasal: Last Dose Taken:  , 2 spray(s) nasal prn         MiraLax oral powder for reconstitution: Last Dose Taken:  , 1 dose(s)  orally once a day, As Needed         Refresh ophthalmic solution: Last Dose Taken:  , 1 drop(s) to each affected  eye 4 times a day, As Needed         Advil 200 mg oral tablet: Last Dose Taken:  , 1 tab(s) orally every 6  hours, As Needed         Gamunex-C 10% injectable solution: Last Dose Taken:  , 70 unit(s) injectable  every 3 weeks         diclofenac 1% topical gel: Last Dose Taken:           Metamucil: Last Dose Taken:  , as needed         ketoconazole 2% topical shampoo: Last Dose Taken:  , Apply topically  to affected area once         predniSONE 10 mg oral tablet: Last Dose Taken:  , 0.5 tab(s) orally once  a day         Restasis: Last Dose Taken:  , 1 drop(s) to each affected eye 2 times  a day         carbidopa-levodopa 23.75 mg-95 mg oral capsule, extended release: Last  Dose Taken:  , unknown dosage. 1.5 Am, 1.5 at lunch, 1 at dinner.             Medical History:         History of subdural hematoma: ICD-10: Z86.79, Status: Active         Repeated falls: ICD-10: R29.6, Status: Active         History of kidney stones: ICD-10: Z87.442, Status: Active          Back pain: ICD-10: M54.9, Status: Active         Scoliosis: ICD-10: M41.9, Status: Active         CIDP (chronic inflammatory demyelinating polyneuropathy):  ICD-10: G61.81, Status: Active       Surg History:         History of carpal tunnel release: ICD-10: Z98.890, Status:  Active         History of hernia surgery: ICD-10: Z98.890, Status: Active         History of surgery: ICD-10: Z98.890, Status: Active, Description:  [place]         History of craniotomy: ICD-10: Z98.890, Status: Active        Social History:   Social Substance History:  ·  Smoking Status never smoker (1)            Vitals and Measurements:   Vitals: Temp: 36.5  HR: 75  RR: 18  BP: 141/91  SPO2%:   98   Measurements: HT(cm): 173.4  WT(kg): 75.8  BSA: 1.91   BMI:  25.2   Last 3 Weights & Heights: Date:                           Weight/Scale Type:                    Height:   10-Aug-2023 10:26                75.8  kg                     173.4  cm  22-Feb-2023 10:53                76.5  kg                     173.4  cm  10-Feb-2023 10:24                76.8  kg                     173.4  cm         Physical Exam:      Constitutional: awake/alert/oriented x3.  Appears  chronically ill but in no acute distress.  Walks with a walker.      Eyes: PERRL, EOMI, clear sclera.      Head/Neck: Neck with normal movement.  No mass, adenopathy  or tenderness.  No JVD. Trachea midline.   Respiratory/Thorax: Thorax symmetric. Clear to auscultation.   No wheezes, rales, rhonchi.  Mediport in place right chest.   Cardiovascular: Regular, rate and rhythm. No murmur.   No rubs or gallops.   Gastrointestinal: Postsurgical changes noted.  Ureterostomy.   Nondistended.  Normal bowel sounds.  Soft, non-tender. No rebound or guarding.    No palpable hepatomegaly,      Musculoskeletal: ROM relatively intact.      Extremities: Unremarkable extremities; no cyanosis,      Neurological: Alert and oriented x3.      Lymphatic: No palpably significant lymphadenopathy   in the neck, supraclavicular or other areas.   Psychological: Appropriate mood and behavior.   Skin: Warm and dry, no rashes, no suspicious lesions.   Few resolving ecchymoses from venipunctures.         Lab Results:     1/23/24) 1 mo ago  (12/29/23) 2 mo ago  (12/8/23) 2 mo ago  (11/17/23) 3 mo ago  (10/23/23) 4 mo ago  (9/29/23) 5 mo ago  (9/8/23)    WBC  4.4 - 11.3 x10*3/uL 6.9 4.4 4.9 5.1 6.5 4.1 Low  R 7.9 R   nRBC  0.0 - 0.0 /100 WBCs 0.0 0.0 0.0 0.0 0.0     RBC  4.50 - 5.90 x10*6/uL 4.21 Low  4.06 Low  4.12 Low  4.16 Low  4.09 Low  4.15 Low  R 4.15 Low  R   Hemoglobin  13.5 - 17.5 g/dL 13.1 Low  12.8 Low  13.1 Low  13.2 Low  13.0 Low  13.1 Low  13.1 Low    Hematocrit  41.0 - 52.0 % 39.1 Low  37.8 Low  38.2 Low  39.6 Low  38.6 Low  39.3 Low  38.5 Low    MCV  80 - 100 fL 93 93 93 95 94 95 93   MCH  26.0 - 34.0 pg 31.1 31.5 31.8 31.7 31.8     MCHC  32.0 - 36.0 g/dL 33.5 33.9 34.3 33.3 33.7 33.3 34.0   RDW  11.5 - 14.5 % 12.7 12.6 12.6 13.0 12.8 12.9 13.0   Platelets  150 - 450 x10*3/uL 172             2 wk ago  (1/23/24) 1 mo ago  (12/29/23) 2 mo ago  (12/8/23) 2 mo ago  (11/17/23) 3 mo ago  (10/23/23) 3 mo ago  (10/18/23) 4 mo ago  (9/29/23)    Glucose  74 - 99 mg/dL 112 High  82 78 76 83 83 89   Sodium  136 - 145 mmol/L 136 136 140 136 136 139 136   Potassium  3.5 - 5.3 mmol/L 4.0 4.0 4.2 4.4 4.0 4.8 4.2   Chloride  98 - 107 mmol/L 103 102 103 101 102 102 102   Bicarbonate  21 - 32 mmol/L 29 29 30 28 28 31 28   Anion Gap  10 - 20 mmol/L 8 Low  9 Low  11 11 10 11 10   Urea Nitrogen  6 - 23 mg/dL 12 14 12 15 11 13 13   Creatinine  0.50 - 1.30 mg/dL 0.81 0.71 0.72 0.80 0.75 0.78 0.81   eGFR  >60 mL/min/1.73m*2 >90 >90 CM >90 CM >90 CM >90 CM >90 CM    Comment: Calculations of estimated GFR are performed using the 2021 CKD-EPI Study Refit equation without the race variable for the IDMS-Traceable creatinine methods.  https://jasn.asnjournals.org/content/early/2021/09/22/ASN.8497864674   Calcium  8.6 - 10.3 mg/dL  9.3 9.3 9.3 9.2 9.2 9.5 9.3   Albumin  3.4 - 5.0 g/dL 4.0 3.8 3.9 4.0 3.9  3.9   Alkaline Phosphatase  33 - 136 U/L 65 61 63 61 60  59   Total Protein  6.4 - 8.2 g/dL 7.4 7.1 7.1 7.3 7.3  7.5   AST  9 - 39 U/L 18 18 19 22 19  19   Bilirubin, Total  0.0 - 1.2 mg/dL 0.5 0.6 0.7 0.6 0.5  0.6   ALT  10 - 52 U/L 6 Low            ·  Results               Assessment and Plan:      Assessment and Plan:   Assessment:    1. IgM MGUS, stable, w/o evidence of an associated lymphoproliferative disorder or myeloma.  M protein stable 0.2 g/dL.  K/L ratio slightly elevated.  No CRAB  signs.  CBC normal.      2.  CIDP. Given the negative anti-MAG Ab and nerve biopsy in the past, MGUS was not felt to be a cause for his polyneuropathy.      3.  History of prostate cancer, GG 3, T3a N0 M0, surveillance. s/p cystoprostatectomy 12/24/2020.  Followed by his urologist.     4.  History of bladder carcinoma in situ, status post resection December 2020.     5.  History left lung cancer stage I, diagnosed February 2022, status post SBRT.       6.  Subcentimeter pulmonary nodules, likely due to inflammatory disease, radiation change, rule out malignancy.     7.  Concern for aspiration.  Work-up in progress.      Plan: MGUS seem to be stable, hemoglobin is stable no evidence of hypercalcemia.  Lab result discussed with the patient.  Will need repeat labs after 6-month and follow-up after 6 months     > 3 0 minutes spent discussing laboratory, radiologic and pathologic findings, the natural history of the disease and treatment as well as documentation of the visit.

## 2024-02-13 LAB
KAPPA LC SERPL-MCNC: 6.11 MG/DL (ref 0.33–1.94)
KAPPA LC/LAMBDA SER: 6.64 {RATIO} (ref 0.26–1.65)
LAMBDA LC SERPL-MCNC: 0.92 MG/DL (ref 0.57–2.63)

## 2024-02-15 LAB
ALBUMIN: 3.9 G/DL (ref 3.4–5)
ALPHA 1 GLOBULIN: 0.3 G/DL (ref 0.2–0.6)
ALPHA 2 GLOBULIN: 0.7 G/DL (ref 0.4–1.1)
BETA GLOBULIN: 1.1 G/DL (ref 0.5–1.2)
GAMMA GLOBULIN: 1.6 G/DL (ref 0.5–1.4)
IMMUNOFIXATION COMMENT: ABNORMAL
M-PROTEIN 1: 0.2 G/DL
PATH REVIEW - SERUM IMMUNOFIXATION: ABNORMAL
PATH REVIEW-SERUM PROTEIN ELECTROPHORESIS: ABNORMAL
PROTEIN ELECTROPHORESIS COMMENT: ABNORMAL

## 2024-02-16 ENCOUNTER — INFUSION (OUTPATIENT)
Dept: INFUSION THERAPY | Facility: HOSPITAL | Age: 79
End: 2024-02-16
Payer: MEDICARE

## 2024-02-16 VITALS
RESPIRATION RATE: 18 BRPM | TEMPERATURE: 97.5 F | SYSTOLIC BLOOD PRESSURE: 141 MMHG | OXYGEN SATURATION: 98 % | DIASTOLIC BLOOD PRESSURE: 89 MMHG | WEIGHT: 169.31 LBS | HEART RATE: 74 BPM | BODY MASS INDEX: 24.65 KG/M2

## 2024-02-16 DIAGNOSIS — G61.81 CHRONIC INFLAMMATORY DEMYELINATING POLYNEURITIS (MULTI): ICD-10-CM

## 2024-02-16 LAB
ALBUMIN SERPL BCP-MCNC: 3.8 G/DL (ref 3.4–5)
ALP SERPL-CCNC: 60 U/L (ref 33–136)
ALT SERPL W P-5'-P-CCNC: 4 U/L (ref 10–52)
ANION GAP SERPL CALC-SCNC: 11 MMOL/L (ref 10–20)
AST SERPL W P-5'-P-CCNC: 20 U/L (ref 9–39)
BASOPHILS # BLD AUTO: 0.01 X10*3/UL (ref 0–0.1)
BASOPHILS NFR BLD AUTO: 0.2 %
BILIRUB SERPL-MCNC: 0.5 MG/DL (ref 0–1.2)
BUN SERPL-MCNC: 12 MG/DL (ref 6–23)
CALCIUM SERPL-MCNC: 8.7 MG/DL (ref 8.6–10.3)
CHLORIDE SERPL-SCNC: 105 MMOL/L (ref 98–107)
CO2 SERPL-SCNC: 26 MMOL/L (ref 21–32)
CREAT SERPL-MCNC: 0.81 MG/DL (ref 0.5–1.3)
EGFRCR SERPLBLD CKD-EPI 2021: >90 ML/MIN/1.73M*2
EOSINOPHIL # BLD AUTO: 0.05 X10*3/UL (ref 0–0.4)
EOSINOPHIL NFR BLD AUTO: 1.1 %
ERYTHROCYTE [DISTWIDTH] IN BLOOD BY AUTOMATED COUNT: 12.9 % (ref 11.5–14.5)
GLUCOSE SERPL-MCNC: 101 MG/DL (ref 74–99)
HCT VFR BLD AUTO: 39.7 % (ref 41–52)
HGB BLD-MCNC: 13.1 G/DL (ref 13.5–17.5)
IMM GRANULOCYTES # BLD AUTO: 0.01 X10*3/UL (ref 0–0.5)
IMM GRANULOCYTES NFR BLD AUTO: 0.2 % (ref 0–0.9)
LYMPHOCYTES # BLD AUTO: 1.33 X10*3/UL (ref 0.8–3)
LYMPHOCYTES NFR BLD AUTO: 29.9 %
MCH RBC QN AUTO: 31.2 PG (ref 26–34)
MCHC RBC AUTO-ENTMCNC: 33 G/DL (ref 32–36)
MCV RBC AUTO: 95 FL (ref 80–100)
MONOCYTES # BLD AUTO: 0.58 X10*3/UL (ref 0.05–0.8)
MONOCYTES NFR BLD AUTO: 13 %
NEUTROPHILS # BLD AUTO: 2.47 X10*3/UL (ref 1.6–5.5)
NEUTROPHILS NFR BLD AUTO: 55.6 %
NRBC BLD-RTO: 0 /100 WBCS (ref 0–0)
PLATELET # BLD AUTO: 164 X10*3/UL (ref 150–450)
POTASSIUM SERPL-SCNC: 4.2 MMOL/L (ref 3.5–5.3)
PROT SERPL-MCNC: 7 G/DL (ref 6.4–8.2)
RBC # BLD AUTO: 4.2 X10*6/UL (ref 4.5–5.9)
SODIUM SERPL-SCNC: 138 MMOL/L (ref 136–145)
WBC # BLD AUTO: 4.5 X10*3/UL (ref 4.4–11.3)

## 2024-02-16 PROCEDURE — 96365 THER/PROPH/DIAG IV INF INIT: CPT | Mod: INF

## 2024-02-16 PROCEDURE — 80053 COMPREHEN METABOLIC PANEL: CPT

## 2024-02-16 PROCEDURE — 2500000004 HC RX 250 GENERAL PHARMACY W/ HCPCS (ALT 636 FOR OP/ED): Performed by: PSYCHIATRY & NEUROLOGY

## 2024-02-16 PROCEDURE — 85025 COMPLETE CBC W/AUTO DIFF WBC: CPT

## 2024-02-16 PROCEDURE — 2500000001 HC RX 250 WO HCPCS SELF ADMINISTERED DRUGS (ALT 637 FOR MEDICARE OP): Performed by: PSYCHIATRY & NEUROLOGY

## 2024-02-16 PROCEDURE — 96366 THER/PROPH/DIAG IV INF ADDON: CPT | Mod: INF

## 2024-02-16 RX ORDER — DIPHENHYDRAMINE HCL 25 MG
25 CAPSULE ORAL ONCE
Status: CANCELLED | OUTPATIENT
Start: 2024-03-05 | End: 2024-03-05

## 2024-02-16 RX ORDER — ACETAMINOPHEN 325 MG/1
650 TABLET ORAL ONCE
Status: CANCELLED | OUTPATIENT
Start: 2024-03-05 | End: 2024-03-05

## 2024-02-16 RX ORDER — HEPARIN SODIUM,PORCINE/PF 10 UNIT/ML
50 SYRINGE (ML) INTRAVENOUS AS NEEDED
Status: CANCELLED | OUTPATIENT
Start: 2024-02-16

## 2024-02-16 RX ORDER — ACETAMINOPHEN 325 MG/1
650 TABLET ORAL ONCE
Status: COMPLETED | OUTPATIENT
Start: 2024-02-16 | End: 2024-02-16

## 2024-02-16 RX ORDER — HEPARIN 100 UNIT/ML
500 SYRINGE INTRAVENOUS AS NEEDED
Status: DISCONTINUED | OUTPATIENT
Start: 2024-02-16 | End: 2024-02-16 | Stop reason: HOSPADM

## 2024-02-16 RX ORDER — HEPARIN 100 UNIT/ML
500 SYRINGE INTRAVENOUS AS NEEDED
Status: CANCELLED | OUTPATIENT
Start: 2024-02-16

## 2024-02-16 RX ORDER — DIPHENHYDRAMINE HCL 25 MG
25 CAPSULE ORAL ONCE
Status: COMPLETED | OUTPATIENT
Start: 2024-02-16 | End: 2024-02-16

## 2024-02-16 RX ADMIN — Medication 500 UNITS: at 13:00

## 2024-02-16 RX ADMIN — IMMUNE GLOBULIN (HUMAN) 70 G: 10 INJECTION INTRAVENOUS; SUBCUTANEOUS at 09:13

## 2024-02-16 RX ADMIN — DIPHENHYDRAMINE HYDROCHLORIDE 25 MG: 25 CAPSULE ORAL at 08:45

## 2024-02-16 RX ADMIN — ACETAMINOPHEN 650 MG: 325 TABLET ORAL at 08:45

## 2024-02-16 ASSESSMENT — ENCOUNTER SYMPTOMS
OCCASIONAL FEELINGS OF UNSTEADINESS: 1
DEPRESSION: 0
LOSS OF SENSATION IN FEET: 1

## 2024-02-28 DIAGNOSIS — I25.10 CORONARY ARTERY DISEASE INVOLVING NATIVE CORONARY ARTERY OF NATIVE HEART WITHOUT ANGINA PECTORIS: ICD-10-CM

## 2024-02-28 RX ORDER — ROSUVASTATIN CALCIUM 5 MG/1
5 TABLET, COATED ORAL DAILY
Qty: 90 TABLET | Refills: 0 | Status: SHIPPED | OUTPATIENT
Start: 2024-02-28 | End: 2024-05-30 | Stop reason: SDUPTHER

## 2024-02-28 RX ORDER — ROSUVASTATIN CALCIUM 5 MG/1
5 TABLET, COATED ORAL DAILY
Qty: 90 TABLET | Refills: 3 | Status: CANCELLED | OUTPATIENT
Start: 2024-02-28 | End: 2025-02-27

## 2024-02-29 ENCOUNTER — TELEPHONE (OUTPATIENT)
Dept: PRIMARY CARE | Facility: CLINIC | Age: 79
End: 2024-02-29
Payer: MEDICARE

## 2024-02-29 NOTE — TELEPHONE ENCOUNTER
Patients wife had called and requested rosuvastatin refill to be sent to [facility] in [place]. Dr. Bro tried sending it but transmission failed.   Ok per dr bro called pharmacy and verbally ok'd the rx to be filled.   Called patients wife and let her know that they are filling it and should be ready to  later.

## 2024-03-01 ENCOUNTER — APPOINTMENT (OUTPATIENT)
Dept: INFUSION THERAPY | Facility: HOSPITAL | Age: 79
End: 2024-03-01
Payer: MEDICARE

## 2024-03-04 DIAGNOSIS — G61.81 CHRONIC INFLAMMATORY DEMYELINATING POLYNEUROPATHY (MULTI): ICD-10-CM

## 2024-03-04 RX ORDER — PREDNISONE 5 MG/1
5 TABLET ORAL DAILY
Qty: 90 TABLET | Refills: 3 | Status: SHIPPED | OUTPATIENT
Start: 2024-03-04 | End: 2025-03-04

## 2024-03-08 ENCOUNTER — INFUSION (OUTPATIENT)
Dept: INFUSION THERAPY | Facility: HOSPITAL | Age: 79
End: 2024-03-08
Payer: MEDICARE

## 2024-03-08 VITALS
BODY MASS INDEX: 24.62 KG/M2 | HEART RATE: 68 BPM | DIASTOLIC BLOOD PRESSURE: 87 MMHG | OXYGEN SATURATION: 98 % | RESPIRATION RATE: 18 BRPM | TEMPERATURE: 97.8 F | SYSTOLIC BLOOD PRESSURE: 137 MMHG | WEIGHT: 169.09 LBS

## 2024-03-08 DIAGNOSIS — G61.81 CHRONIC INFLAMMATORY DEMYELINATING POLYNEURITIS (MULTI): ICD-10-CM

## 2024-03-08 LAB
ALBUMIN SERPL BCP-MCNC: 3.9 G/DL (ref 3.4–5)
ALP SERPL-CCNC: 68 U/L (ref 33–136)
ALT SERPL W P-5'-P-CCNC: 5 U/L (ref 10–52)
ANION GAP SERPL CALC-SCNC: 10 MMOL/L (ref 10–20)
AST SERPL W P-5'-P-CCNC: 19 U/L (ref 9–39)
BASOPHILS # BLD AUTO: 0.01 X10*3/UL (ref 0–0.1)
BASOPHILS NFR BLD AUTO: 0.2 %
BILIRUB SERPL-MCNC: 0.5 MG/DL (ref 0–1.2)
BUN SERPL-MCNC: 14 MG/DL (ref 6–23)
CALCIUM SERPL-MCNC: 9 MG/DL (ref 8.6–10.3)
CHLORIDE SERPL-SCNC: 102 MMOL/L (ref 98–107)
CO2 SERPL-SCNC: 29 MMOL/L (ref 21–32)
CREAT SERPL-MCNC: 0.77 MG/DL (ref 0.5–1.3)
EGFRCR SERPLBLD CKD-EPI 2021: >90 ML/MIN/1.73M*2
EOSINOPHIL # BLD AUTO: 0.04 X10*3/UL (ref 0–0.4)
EOSINOPHIL NFR BLD AUTO: 0.9 %
ERYTHROCYTE [DISTWIDTH] IN BLOOD BY AUTOMATED COUNT: 13 % (ref 11.5–14.5)
GLUCOSE SERPL-MCNC: 93 MG/DL (ref 74–99)
HCT VFR BLD AUTO: 37.5 % (ref 41–52)
HGB BLD-MCNC: 12.8 G/DL (ref 13.5–17.5)
IMM GRANULOCYTES # BLD AUTO: 0.01 X10*3/UL (ref 0–0.5)
IMM GRANULOCYTES NFR BLD AUTO: 0.2 % (ref 0–0.9)
LYMPHOCYTES # BLD AUTO: 1.26 X10*3/UL (ref 0.8–3)
LYMPHOCYTES NFR BLD AUTO: 27.8 %
MCH RBC QN AUTO: 32 PG (ref 26–34)
MCHC RBC AUTO-ENTMCNC: 34.1 G/DL (ref 32–36)
MCV RBC AUTO: 94 FL (ref 80–100)
MONOCYTES # BLD AUTO: 0.48 X10*3/UL (ref 0.05–0.8)
MONOCYTES NFR BLD AUTO: 10.6 %
NEUTROPHILS # BLD AUTO: 2.73 X10*3/UL (ref 1.6–5.5)
NEUTROPHILS NFR BLD AUTO: 60.3 %
NRBC BLD-RTO: 0 /100 WBCS (ref 0–0)
PLATELET # BLD AUTO: 144 X10*3/UL (ref 150–450)
POTASSIUM SERPL-SCNC: 4.2 MMOL/L (ref 3.5–5.3)
PROT SERPL-MCNC: 7 G/DL (ref 6.4–8.2)
RBC # BLD AUTO: 4 X10*6/UL (ref 4.5–5.9)
SODIUM SERPL-SCNC: 137 MMOL/L (ref 136–145)
WBC # BLD AUTO: 4.5 X10*3/UL (ref 4.4–11.3)

## 2024-03-08 PROCEDURE — 96365 THER/PROPH/DIAG IV INF INIT: CPT | Mod: INF

## 2024-03-08 PROCEDURE — 2500000001 HC RX 250 WO HCPCS SELF ADMINISTERED DRUGS (ALT 637 FOR MEDICARE OP): Performed by: PSYCHIATRY & NEUROLOGY

## 2024-03-08 PROCEDURE — 96366 THER/PROPH/DIAG IV INF ADDON: CPT | Mod: INF

## 2024-03-08 PROCEDURE — 80053 COMPREHEN METABOLIC PANEL: CPT

## 2024-03-08 PROCEDURE — 2500000004 HC RX 250 GENERAL PHARMACY W/ HCPCS (ALT 636 FOR OP/ED): Mod: JZ | Performed by: PSYCHIATRY & NEUROLOGY

## 2024-03-08 PROCEDURE — 85025 COMPLETE CBC W/AUTO DIFF WBC: CPT

## 2024-03-08 RX ORDER — ACETAMINOPHEN 325 MG/1
650 TABLET ORAL ONCE
Status: COMPLETED | OUTPATIENT
Start: 2024-03-08 | End: 2024-03-08

## 2024-03-08 RX ORDER — HEPARIN SODIUM,PORCINE/PF 10 UNIT/ML
50 SYRINGE (ML) INTRAVENOUS AS NEEDED
Status: DISCONTINUED | OUTPATIENT
Start: 2024-03-08 | End: 2024-03-08 | Stop reason: HOSPADM

## 2024-03-08 RX ORDER — HEPARIN 100 UNIT/ML
500 SYRINGE INTRAVENOUS AS NEEDED
Status: CANCELLED | OUTPATIENT
Start: 2024-03-08

## 2024-03-08 RX ORDER — DIPHENHYDRAMINE HCL 25 MG
25 CAPSULE ORAL ONCE
Status: COMPLETED | OUTPATIENT
Start: 2024-03-08 | End: 2024-03-08

## 2024-03-08 RX ORDER — HEPARIN SODIUM,PORCINE/PF 10 UNIT/ML
50 SYRINGE (ML) INTRAVENOUS AS NEEDED
Status: CANCELLED | OUTPATIENT
Start: 2024-03-08

## 2024-03-08 RX ORDER — DIPHENHYDRAMINE HCL 25 MG
25 CAPSULE ORAL ONCE
Status: CANCELLED | OUTPATIENT
Start: 2024-03-29 | End: 2024-03-29

## 2024-03-08 RX ORDER — HEPARIN 100 UNIT/ML
500 SYRINGE INTRAVENOUS AS NEEDED
Status: DISCONTINUED | OUTPATIENT
Start: 2024-03-08 | End: 2024-03-08 | Stop reason: HOSPADM

## 2024-03-08 RX ORDER — ACETAMINOPHEN 325 MG/1
650 TABLET ORAL ONCE
Status: CANCELLED | OUTPATIENT
Start: 2024-03-29 | End: 2024-03-29

## 2024-03-08 RX ADMIN — IMMUNE GLOBULIN (HUMAN) 70 G: 10 INJECTION INTRAVENOUS; SUBCUTANEOUS at 09:22

## 2024-03-08 RX ADMIN — ACETAMINOPHEN 650 MG: 325 TABLET ORAL at 08:38

## 2024-03-08 RX ADMIN — DIPHENHYDRAMINE HYDROCHLORIDE 25 MG: 25 CAPSULE ORAL at 08:38

## 2024-03-08 RX ADMIN — Medication 500 UNITS: at 13:05

## 2024-03-08 ASSESSMENT — ENCOUNTER SYMPTOMS
LOSS OF SENSATION IN FEET: 0
OCCASIONAL FEELINGS OF UNSTEADINESS: 1
DEPRESSION: 0

## 2024-03-18 ENCOUNTER — OFFICE VISIT (OUTPATIENT)
Dept: PRIMARY CARE | Facility: CLINIC | Age: 79
End: 2024-03-18
Payer: MEDICARE

## 2024-03-18 VITALS
WEIGHT: 167 LBS | TEMPERATURE: 97.5 F | SYSTOLIC BLOOD PRESSURE: 128 MMHG | DIASTOLIC BLOOD PRESSURE: 84 MMHG | BODY MASS INDEX: 24.32 KG/M2

## 2024-03-18 DIAGNOSIS — G20.C PARKINSONISM, UNSPECIFIED PARKINSONISM TYPE (MULTI): ICD-10-CM

## 2024-03-18 DIAGNOSIS — F34.1 PERSISTENT DEPRESSIVE DISORDER: Primary | ICD-10-CM

## 2024-03-18 PROBLEM — U07.1 DISEASE DUE TO SEVERE ACUTE RESPIRATORY SYNDROME CORONAVIRUS 2 (SARS-COV-2): Status: ACTIVE | Noted: 2024-03-18

## 2024-03-18 PROBLEM — K59.00 CONSTIPATION: Status: ACTIVE | Noted: 2024-03-18

## 2024-03-18 PROBLEM — M79.604 PAIN OF RIGHT LOWER EXTREMITY: Status: RESOLVED | Noted: 2024-03-18 | Resolved: 2024-03-18

## 2024-03-18 PROBLEM — N39.0 ACUTE URINARY TRACT INFECTION: Status: ACTIVE | Noted: 2024-03-18

## 2024-03-18 PROBLEM — R91.8 ABNORMAL FINDINGS ON DIAGNOSTIC IMAGING OF LUNG: Status: ACTIVE | Noted: 2024-03-18

## 2024-03-18 PROBLEM — N50.82 PAIN IN SCROTUM: Status: RESOLVED | Noted: 2024-03-18 | Resolved: 2024-03-18

## 2024-03-18 PROBLEM — B35.6 TINEA CRURIS: Status: RESOLVED | Noted: 2024-03-18 | Resolved: 2024-03-18

## 2024-03-18 PROBLEM — M25.519 SHOULDER PAIN: Status: RESOLVED | Noted: 2024-03-18 | Resolved: 2024-03-18

## 2024-03-18 PROBLEM — H04.209 EXCESSIVE TEAR PRODUCTION: Status: RESOLVED | Noted: 2024-03-18 | Resolved: 2024-03-18

## 2024-03-18 PROBLEM — N50.82 ACUTE PAIN IN SCROTUM: Status: RESOLVED | Noted: 2024-03-18 | Resolved: 2024-03-18

## 2024-03-18 PROBLEM — S69.90XA FINGER INJURY: Status: RESOLVED | Noted: 2024-03-18 | Resolved: 2024-03-18

## 2024-03-18 PROBLEM — H04.129 TEAR FILM INSUFFICIENCY: Status: RESOLVED | Noted: 2024-03-18 | Resolved: 2024-03-18

## 2024-03-18 PROBLEM — R09.81 NASAL CONGESTION: Status: RESOLVED | Noted: 2024-03-18 | Resolved: 2024-03-18

## 2024-03-18 PROBLEM — Z86.79 HISTORY OF SUBDURAL HEMATOMA: Status: RESOLVED | Noted: 2024-03-18 | Resolved: 2024-03-18

## 2024-03-18 PROCEDURE — 1160F RVW MEDS BY RX/DR IN RCRD: CPT | Performed by: NURSE PRACTITIONER

## 2024-03-18 PROCEDURE — 1159F MED LIST DOCD IN RCRD: CPT | Performed by: NURSE PRACTITIONER

## 2024-03-18 PROCEDURE — 1157F ADVNC CARE PLAN IN RCRD: CPT | Performed by: NURSE PRACTITIONER

## 2024-03-18 PROCEDURE — 1036F TOBACCO NON-USER: CPT | Performed by: NURSE PRACTITIONER

## 2024-03-18 PROCEDURE — 99214 OFFICE O/P EST MOD 30 MIN: CPT | Performed by: NURSE PRACTITIONER

## 2024-03-18 RX ORDER — SERTRALINE HYDROCHLORIDE 25 MG/1
25 TABLET, FILM COATED ORAL DAILY
Qty: 30 TABLET | Refills: 1 | Status: SHIPPED | OUTPATIENT
Start: 2024-03-18 | End: 2024-04-30 | Stop reason: SDUPTHER

## 2024-03-18 RX ORDER — TAMSULOSIN HYDROCHLORIDE 0.4 MG/1
CAPSULE ORAL
COMMUNITY
Start: 2020-11-23 | End: 2024-03-18 | Stop reason: ALTCHOICE

## 2024-03-18 ASSESSMENT — ENCOUNTER SYMPTOMS
CARDIOVASCULAR NEGATIVE: 1
NEUROLOGICAL NEGATIVE: 1
RESPIRATORY NEGATIVE: 1
PSYCHIATRIC NEGATIVE: 1
MUSCULOSKELETAL NEGATIVE: 1
CONSTITUTIONAL NEGATIVE: 1

## 2024-03-18 ASSESSMENT — PATIENT HEALTH QUESTIONNAIRE - PHQ9: 1. LITTLE INTEREST OR PLEASURE IN DOING THINGS: NOT AT ALL

## 2024-03-18 NOTE — PATIENT INSTRUCTIONS
I have started you on sertraline at a low dose.   Let me know in 1 month if no better.   Follow up with Dr Bro in May

## 2024-03-18 NOTE — PROGRESS NOTES
Subjective   Patient ID: Donato Montgomery is a 78 y.o. male who presents for Anxiety (Discuss options).    HPI Presents today with his wife with concerns for depression.  Mostly happens at night and and is disrupting his sleep.  He falls asleep well but then will wake up and feel anxious and sad.   It will wake him and he has trouble falling back asleep.   He worries all about his health issues.  Is still on prednisone daily   Also he is worried abut his complexion.  Feels in the morning his cheeks are red. Has grown a beard to hide it.  Wife does not feel she notices it.   Blood work 3/8/2024 reviewed as noted  Review of Systems   Constitutional: Negative.    Respiratory: Negative.     Cardiovascular: Negative.    Musculoskeletal: Negative.    Neurological: Negative.    Psychiatric/Behavioral: Negative.          As noted in HPI         Objective   /84 (BP Location: Left arm, Patient Position: Sitting)   Temp 36.4 °C (97.5 °F) (Temporal)   Wt 75.8 kg (167 lb)   BMI 24.32 kg/m²     Physical Exam  Constitutional:       Appearance: Normal appearance. He is ill-appearing.   Cardiovascular:      Rate and Rhythm: Normal rate and regular rhythm.   Pulmonary:      Effort: Pulmonary effort is normal.      Breath sounds: Normal breath sounds.   Musculoskeletal:         General: Normal range of motion.      Comments: Walking with walker   Neurological:      General: No focal deficit present.      Mental Status: He is alert and oriented to person, place, and time.   Psychiatric:         Mood and Affect: Mood normal.         Behavior: Behavior normal.         Thought Content: Thought content normal.         Judgment: Judgment normal.       Medications interaction reviewed.  Will start miguelina low dose of sertaline to prevent and patient and wife will monitor for increase in fatigue.  Patient in agreement with plan  Assessment/Plan   Problem List Items Addressed This Visit             ICD-10-CM    Parkinsonism G20.C     Other  Visit Diagnoses         Codes    Persistent depressive disorder    -  Primary F34.1    Relevant Medications    sertraline (Zoloft) 25 mg tablet

## 2024-03-21 ENCOUNTER — OFFICE VISIT (OUTPATIENT)
Dept: NEUROLOGY | Facility: CLINIC | Age: 79
End: 2024-03-21
Payer: MEDICARE

## 2024-03-21 VITALS
DIASTOLIC BLOOD PRESSURE: 66 MMHG | HEART RATE: 79 BPM | HEIGHT: 69 IN | RESPIRATION RATE: 16 BRPM | WEIGHT: 167 LBS | SYSTOLIC BLOOD PRESSURE: 101 MMHG | BODY MASS INDEX: 24.73 KG/M2

## 2024-03-21 DIAGNOSIS — G20.C PRIMARY PARKINSONISM (MULTI): Primary | ICD-10-CM

## 2024-03-21 DIAGNOSIS — G20.A1 PARKINSON'S DISEASE WITHOUT FLUCTUATING MANIFESTATIONS, UNSPECIFIED WHETHER DYSKINESIA PRESENT (MULTI): ICD-10-CM

## 2024-03-21 PROCEDURE — 1160F RVW MEDS BY RX/DR IN RCRD: CPT | Performed by: PSYCHIATRY & NEUROLOGY

## 2024-03-21 PROCEDURE — 1036F TOBACCO NON-USER: CPT | Performed by: PSYCHIATRY & NEUROLOGY

## 2024-03-21 PROCEDURE — 1159F MED LIST DOCD IN RCRD: CPT | Performed by: PSYCHIATRY & NEUROLOGY

## 2024-03-21 PROCEDURE — 1157F ADVNC CARE PLAN IN RCRD: CPT | Performed by: PSYCHIATRY & NEUROLOGY

## 2024-03-21 PROCEDURE — G2211 COMPLEX E/M VISIT ADD ON: HCPCS | Performed by: PSYCHIATRY & NEUROLOGY

## 2024-03-21 PROCEDURE — 99214 OFFICE O/P EST MOD 30 MIN: CPT | Performed by: PSYCHIATRY & NEUROLOGY

## 2024-03-21 RX ORDER — CARBIDOPA AND LEVODOPA 25; 100 MG/1; MG/1
TABLET ORAL
Qty: 405 TABLET | Refills: 3 | Status: SHIPPED | OUTPATIENT
Start: 2024-03-21

## 2024-03-21 NOTE — PROGRESS NOTES
Subjective     Wade Montgomery is a 78 y.o. year old male here for parkinsonism follow up.   Arrives with wife who provides more history.  Parkinson's Disease  Arrives with wife today.  We had a virtual visit in December, last office visit was October.   Qamar scan + abnormal and consistent with PD.  He fell 4 days ago at home trying to move furniture and his left leg got stuck on walker and fell forward.   No hallucinations.   He was on Sinemet 2 tabs TID (8 am /1pm / 6pm)  but had some hallucinations, shadow people. He is seeing GI specialist for constipation.   MRI Brain was normal.    Takes Sinemet 25-100mg 1.5 tab TID - no AE.   He has CIPD. IVIG treatments he goes to infusion center at [facility].   Also noticed fine motor movements are impaired, facial expressions are masked more. Voice is softer.       FH: no PD in family. father had neuropathy. spine issues in family. 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   PMH: scoliosis. neuropathy.      hx of craniotomy and SDH 2012 -secondary from acting out is dreams ( hit his night stand)     Review of Systems    Patient Active Problem List   Diagnosis    Bladder cancer (CMS/HCC)    Chronic inflammatory demyelinating polyneuropathy (CMS/HCC)    Coronary artery disease involving native coronary artery of native heart without angina pectoris    Prostate cancer (CMS/HCC)    Unstable gait    Lung cancer (CMS/HCC)    Abnormal ejaculation    Abnormal stress test    Acquired involutional ptosis of both eyelids    Arthritis of knee, right    Astigmatism    Hyperopia    Baker cyst    Basal cell carcinoma of skin of right upper limb, including shoulder    Basal cell carcinoma of skin of other parts of face    Bilateral impacted cerumen    Bilateral partial vocal cord paralysis    Blepharitis, left eye    Calf swelling    Carcinoma in situ of skin of scalp and neck    Choroidal nevus of left eye    Dermatochalasis    Disturbance of skin  sensation    CHAUDHARI (dyspnea on exertion)    Dysphagia    Eustachian tube dysfunction    Glossopharyngeal nerve disorder    Peripheral neuropathy    Glottic insufficiency    Ground glass opacity present on imaging of lung    Hemangioma of skin and subcutaneous tissue    Hematuria    Hemodynamic instability    History of bladder cancer    Personal history of other malignant neoplasm of skin    Hoarseness of voice    Hypovolemia    IgM monoclonal gammopathy of uncertain significance    MGUS (monoclonal gammopathy of unknown significance)    Kappa light chain disease (CMS/HCC)    Left inguinal hernia    Lichen simplex chronicus    Lung nodule, multiple    Malignant neoplasm of lower lobe of left lung (CMS/HCC)    Melanocytic nevi of scalp and neck    Melanocytic nevi of trunk    Melanocytic nevi of unspecified lower limb, including hip    Melanocytic nevi of unspecified upper limb, including shoulder    Melanocytic nevi of other parts of face    Memory changes    Mixed hyperlipidemia    Movement disorder    Neoplasm of uncertain behavior of skin    Actinic keratosis    Inflamed seborrheic keratosis    Other seborrheic keratosis    Skin changes due to chronic exposure to nonionizing radiation, unspecified    Parkinsonism    PCO (posterior capsular opacification), right    Pseudophakia of both eyes    Scar condition and fibrosis of skin    Seborrheic dermatitis, unspecified    Sensorineural hearing loss, bilateral    Spinal stenosis of lumbar region at multiple levels    Spinal stenosis    SPK (superficial punctate keratitis)    Tongue fasciculation    Trigger finger, acquired    Trigger middle finger of right hand    Unspecified voice and resonance disorder    Unstable balance    Urinary retention    Weakness of both hands    Weakness of extremity    Xerosis cutis    Malignant neoplasm of overlapping sites of bladder (CMS/HCC)    Urothelial cancer (CMS/HCC)    SCC (squamous cell carcinoma)    Anemia    Chest pain     Chronic inflammatory demyelinating polyneuritis (CMS/HCC)    Polyneuropathy    Erectile disorder    Monoclonal gammopathy    Idiopathic progressive neuropathy    Secondary malignant neoplasm of left lung (CMS/HCC)    Other specified disorders of adrenal gland (CMS/HCC)    Disease due to severe acute respiratory syndrome coronavirus 2 (SARS-CoV-2)    Constipation    Acute urinary tract infection    Abnormal findings on diagnostic imaging of lung     Past Medical History:   Diagnosis Date    Acute pain in scrotum 03/18/2024    Bicipital tendinitis, left shoulder 02/27/2014    Biceps tendonitis on left    Contact with and (suspected) exposure to unspecified communicable disease 10/24/2019    Exposure to communicable disease    Dry eye syndrome of bilateral lacrimal glands     Dry eye syndrome of both eyes    Encounter for general adult medical examination without abnormal findings 08/19/2019    Initial Medicare annual wellness visit    Excessive tear production 03/18/2024    Finger injury 03/18/2024    History of subdural hematoma 03/18/2024    Nasal congestion 03/18/2024    Other chest pain 07/14/2015    Chest pain, muscular    Pain in left hip 04/20/2022    Left hip pain    Pain in left knee 04/20/2022    Left knee pain    Pain in scrotum 03/18/2024    Pain of right lower extremity 03/18/2024    Pelvic and perineal pain 08/19/2019    Suprapubic pressure    Personal history of other diseases of the circulatory system     History of subdural hematoma    Personal history of other diseases of the digestive system 05/10/2018    History of rectal bleeding    Personal history of other diseases of the musculoskeletal system and connective tissue 02/09/2022    History of low back pain    Personal history of other diseases of the respiratory system 02/27/2014    History of acute bronchitis    Personal history of other diseases of urinary system     History of hematuria    Personal history of other drug therapy     History of  influenza vaccination    Personal history of other infectious and parasitic diseases 09/09/2019    History of tinea cruris    Personal history of other infectious and parasitic diseases 03/30/2017    History of tinea cruris    Personal history of other specified conditions 09/09/2020    History of flank pain    Personal history of other specified conditions 03/03/2020    History of nasal congestion    Personal history of other specified conditions 02/14/2022    History of dysuria    Personal history of other specified conditions 12/10/2019    History of epigastric pain    Personal history of urinary calculi 07/24/2019    History of urinary stone    Rash and other nonspecific skin eruption 01/06/2017    Rash    Retention of urine, unspecified 11/25/2020    Urinary retention with incomplete bladder emptying    Shoulder pain 03/18/2024    Strain of muscle, fascia and tendon of lower back, initial encounter 07/14/2015    Lumbar strain    Tear film insufficiency 03/18/2024    Tinea cruris 03/18/2024    Trochanteric bursitis, left hip 09/22/2021    Greater trochanteric bursitis of left hip     Past Surgical History:   Procedure Laterality Date    CT GUIDED PERCUTANEOUS BIOPSY LUNG  2/9/2022    CT GUIDED PERCUTANEOUS BIOPSY LUNG 2/9/2022 [facility]    OTHER SURGICAL HISTORY  02/13/2020    Hernia repair    OTHER SURGICAL HISTORY  02/13/2020    Knee surgery    OTHER SURGICAL HISTORY  02/13/2020    Trigger finger repair    OTHER SURGICAL HISTORY  01/13/2020    Craniotomy    OTHER SURGICAL HISTORY  01/13/2020    Carpal tunnel surgery    OTHER SURGICAL HISTORY  10/27/2020    Sinus surgery    OTHER SURGICAL HISTORY  02/16/2022    Cataract surgery    OTHER SURGICAL HISTORY  01/26/2021    Nerve biospy     Social History     Tobacco Use    Smoking status: Never     Passive exposure: Past    Smokeless tobacco: Never   Substance Use Topics    Alcohol use: Yes     Comment: rare     family history includes Dementia in his maternal  grandmother and mother; Diabetes type I in his son; Heart attack in his father; Liver cancer in his father; Macular degeneration in his father; Scoliosis in his mother; peripheral neuropathy in his father.    Current Outpatient Medications:     acetaminophen (TYLENOL ORAL), if needed., Disp: , Rfl:     aspirin 81 mg EC tablet, Take 1 tablet (81 mg) by mouth once daily., Disp: , Rfl:     carbidopa-levodopa (Parcopa)  mg disintegrating tablet, Take 1.5 tablets by mouth 3 times a day., Disp: , Rfl:     carboxymethylcellulose (Refresh Plus) 0.5 % ophthalmic solution, Administer into affected eye(s) 4 times a day., Disp: , Rfl:     diclofenac sodium (Voltaren) 1 % gel gel, Apply 4.5 inches (4 g) topically 4 times a day.  APPLY TO LOWER EXTREMITIES. DO NOT APPLY MORE THAN 16 GM DAILY TO ANY ONE AFFECTED JOINT., Disp: , Rfl:     diphenhydramine HCl (BENADRYL ORAL), if needed., Disp: , Rfl:     docusate sodium (Colace) 100 mg capsule, Take 2 capsules (200 mg) by mouth once daily., Disp: , Rfl:     fluticasone (Flonase) 50 mcg/actuation nasal spray, Administer 2 sprays into each nostril if needed., Disp: , Rfl:     ibuprofen 600 mg tablet, Take 1 tablet (600 mg) by mouth every 6 hours if needed. States takes 200 mg, Disp: , Rfl:     immune globul G-gly-IgA avg 46 (Gamunex-C) 20 gram/200 mL (10 %) solution, Infuse 70 gm divided over 1 days every 3 weeks x 6 months. Premedicate with 650 mg acetaminophen PO, 25 mg diphenhydramine PO, and 100 mg hydrocortisone IV push., Disp: , Rfl:     ketoconazole (NIZOral) 2 % shampoo, Ketoconazole 2 % External Shampoo Quantity: 120  Refills: 0  Start: 15-Sep-2021, Disp: , Rfl:     melatonin 5 mg tablet, Take by mouth., Disp: , Rfl:     multivitamin tablet, Take 1 tablet by mouth once daily. Take with food, Disp: , Rfl:     polyethylene glycol (Miralax) 17 gram/dose powder, Take by mouth., Disp: , Rfl:     predniSONE (Deltasone) 5 mg tablet, Take 1 tablet (5 mg) by mouth once daily.,  Disp: 90 tablet, Rfl: 3    Restasis 0.05 % ophthalmic emulsion, Administer 1 drop into both eyes every 12 hours., Disp: 180 each, Rfl: 3    rosuvastatin (Crestor) 5 mg tablet, Take 1 tablet (5 mg) by mouth once daily., Disp: 90 tablet, Rfl: 0    sertraline (Zoloft) 25 mg tablet, Take 1 tablet (25 mg) by mouth once daily., Disp: 30 tablet, Rfl: 1  Allergies   Allergen Reactions    Oxycodone Other and Hallucinations     Psychosis    Penicillin V Other     There were no vitals taken for this visit.  Neurological Exam/Physical Exam:    Constitutional: General appearance: no acute distress. Pleasant.   Auscultation of Heart: Regular rate and rhythm, no murmurs, normal S1 and S2.   Carotid Arteries: Intact without any bruits   Peripheral Vascular Exam: Pulses +2 and equal in all extremities. No swelling, edema or tenderness to palpations   Mental status: The patient was in no distress, alert, interactive and cooperative. Affect is appropriate.   Orientation: oriented to person, oriented to place and oriented to time.   Memory: recent memory intact   Attention: normal attention  Language: normal comprehension   Fund of knowledge: Patient displays adequate knowledge of current events  Eyes: The ophthalmoscopic examination was normal.   Cranial nerve II: Visual fields full to confrontation.   Cranial nerves III, IV, and VI: Pupils round, equally reactive to light; no ptosis. EOMs intact. No nystagmus.   Cranial Nerve V: Facial sensation intact to LT bilaterally.   Cranial nerve VII: Normal and symmetric facial strength.   Cranial nerve VIII: Hearing is intact bilaterally to finger rub.  Cranial nerves IX and X: Palate elevates symmetrically.   Cranial nerve XI: Shoulder shrug and neck rotation strength are intact.   Cranial nerve XII: Tongue is midline.  Motor:  Muscle bulk was normal in both upper and lower extremities.    No atrophy.   Strength is slightly decreased in hands, has flexion of some fingers, jadon  contractures of R and L 2nd and 3rd digits.   Deep Tendon Reflexes: left biceps 2+ , right biceps 2+, left triceps 2+, right triceps 2+, left brachioradialis 2+, right brachioradialis 2+, left patella 2+, right patella 2+, left ankle jerk 1+, right ankle jerk 1+   Plantar Reflex: Toes downgoing to plantar stimulation on the left. Toes downgoing to plantar stimulation on the right.   Sensory Exam: decreased vibration and LT in both legs up to knees b/l.  Coordination: There is no limb dystaxia and rapid alternating movements are intact.   Gait:  uses walker. Slow . Scoliosis . Left leg slightly slower.        Labs:    CBC:   Lab Results   Component Value Date    WBC 4.5 03/08/2024    HGB 12.8 (L) 03/08/2024    HCT 37.5 (L) 03/08/2024     (L) 03/08/2024     BMP:   Lab Results   Component Value Date     03/08/2024    K 4.2 03/08/2024     03/08/2024    CO2 29 03/08/2024    BUN 14 03/08/2024    CREATININE 0.77 03/08/2024    CALCIUM 9.0 03/08/2024    MG 2.01 12/28/2020    PHOS 2.4 (L) 12/28/2020     LFT:   Lab Results   Component Value Date    ALKPHOS 68 03/08/2024    BILITOT 0.5 03/08/2024    BILIDIR 0.3 12/28/2020    PROT 7.0 03/08/2024    ALBUMIN 3.9 03/08/2024    ALT 5 (L) 03/08/2024    AST 19 03/08/2024         Assessment/Plan   Problem List Items Addressed This Visit    None   atypical features. 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   CIPD neuropathy, hx of brain bleed as well.   This is a chronic neurologic condition that requires ongoing care and monitoring. This is a complex, serious condition that needs long term care going forward. Between myself and the patient we will be changing direction of care depending on responses to treatment.   Today we discussed medication options, non medication options for management and various other symptoms that are in relation to this disease.  I will continue to be involved in the care of this patient.

## 2024-03-21 NOTE — PATIENT INSTRUCTIONS
"It was a pleasure seeing you today.     Please make a follow up appointment in 6 months or sooner if needed.     For any urgent issues or needing to speak to a medical assistant please call [phone], option 6 during our office hours Monday-Friday 8am-4pm, and leave a voicemail with your concern.  My office will try to reach back you as soon as possible within 24 (business) hours.  If you have an emergency please call 911 or visit a local urgent care or nearest emergency room.      Please understand that [employer] is a useful communication tool for simple \"normal\" results or a refill request but I would not recommend using this tool for emergent or urgent issues or for conversations with me.  I am happy to ask my staff to rearrange a follow up visit or a virtual visit sooner than requested if appropriate for your care.    "

## 2024-03-22 ENCOUNTER — APPOINTMENT (OUTPATIENT)
Dept: INFUSION THERAPY | Facility: HOSPITAL | Age: 79
End: 2024-03-22
Payer: MEDICARE

## 2024-03-25 NOTE — PROGRESS NOTES
"Subjective     Wade Montgomery \"Donato\" is a 78 y.o. male who presents for the following: Skin Exam.  The patient and his wife note a raised, scaly bump on his left cheek in front of his ear, which has been catching and itching recently.  It has not changed in any other way, including in size, shape, or color, and it does not hurt or bleed.  He also notes dry, flaky skin on his face, especially in his eyebrows.  He denies any other new, changing, or concerning skin lesions since his last visit; no bleeding, itching, or burning lesions.      Review of Systems:  No other skin or systemic complaints other than what is documented elsewhere in the note.    The following portions of the chart were reviewed this encounter and updated as appropriate:       Skin Cancer History  No skin cancer on file.    Specialty Problems          Dermatology Problems    Personal history of other malignant neoplasm of skin    Actinic keratosis    Basal cell carcinoma of skin of other parts of face    Basal cell carcinoma of skin of right upper limb, including shoulder    Carcinoma in situ of skin of scalp and neck    Hemangioma of skin and subcutaneous tissue    Inflamed seborrheic keratosis    Lichen simplex chronicus    Melanocytic nevi of other parts of face    Melanocytic nevi of scalp and neck    Melanocytic nevi of trunk    Melanocytic nevi of unspecified lower limb, including hip    Melanocytic nevi of unspecified upper limb, including shoulder    Neoplasm of uncertain behavior of skin    Other seborrheic keratosis    Scar condition and fibrosis of skin    Seborrheic dermatitis, unspecified    Skin changes due to chronic exposure to nonionizing radiation, unspecified    Xerosis cutis    SCC (squamous cell carcinoma)     SCC SERIES            Past Dermatologic / Past Relevant Medical History:    - history of SCC in situ on right lateral neck diagnosed on 3/16/22 s/p Mohs surgery by Dr. Sims on 3/31/22  - history of BCC on left " lateral eyebrow diagnosed on 3/4/20 s/p Mohs surgery by Dr. Sawyer on 3/31/20  - BCC on right distal shoulder diagnosed on 3/19/19 s/p ED&C on 4/17/19  - AKs  - mildly dysplastic junctional nevus on mid-back on 3/4/20  - biopsy-proven lichen simplex chronicus on left lateral scrotum as above  - Bladder cancer s/p radical cystectomy, for which he follows with Dr. Jeremiah Gomez in Urology  - CIDP  - no history of eczema, psoriasis, or melanoma     Family History:    Sister - skin cancer of unknown type  No family history of eczema or psoriasis    Social History:    The patient is retired from working in [employer] and then [employer]    Allergies:  Oxycodone and Penicillin v    Current Medications / CAM's:    Current Outpatient Medications:     acetaminophen (TYLENOL ORAL), if needed., Disp: , Rfl:     aspirin 81 mg EC tablet, Take 1 tablet (81 mg) by mouth once daily., Disp: , Rfl:     carbidopa-levodopa (Sinemet)  mg tablet, Take 1.5 tabs TID, Disp: 405 tablet, Rfl: 3    carboxymethylcellulose (Refresh Plus) 0.5 % ophthalmic solution, Administer into affected eye(s) 4 times a day., Disp: , Rfl:     diclofenac sodium (Voltaren) 1 % gel gel, Apply 4.5 inches (4 g) topically 4 times a day.  APPLY TO LOWER EXTREMITIES. DO NOT APPLY MORE THAN 16 GM DAILY TO ANY ONE AFFECTED JOINT., Disp: , Rfl:     diphenhydramine HCl (BENADRYL ORAL), if needed., Disp: , Rfl:     docusate sodium (Colace) 100 mg capsule, Take 2 capsules (200 mg) by mouth once daily., Disp: , Rfl:     fluticasone (Flonase) 50 mcg/actuation nasal spray, Administer 2 sprays into each nostril if needed., Disp: , Rfl:     ibuprofen 600 mg tablet, Take 1 tablet (600 mg) by mouth every 6 hours if needed. States takes 200 mg, Disp: , Rfl:     immune globul G-gly-IgA avg 46 (Gamunex-C) 20 gram/200 mL (10 %) solution, Infuse 70 gm divided over 1 days every 3 weeks x 6 months. Premedicate with 650 mg acetaminophen PO, 25 mg diphenhydramine PO, and 100 mg  hydrocortisone IV push., Disp: , Rfl:     ketoconazole (NIZOral) 2 % cream, Apply twice daily to affected areas of face, Disp: 60 g, Rfl: 11    ketoconazole (NIZOral) 2 % shampoo, Ketoconazole 2 % External Shampoo Quantity: 120  Refills: 0  Start: 15-Sep-2021, Disp: , Rfl:     melatonin 5 mg tablet, Take by mouth., Disp: , Rfl:     multivitamin tablet, Take 1 tablet by mouth once daily. Take with food, Disp: , Rfl:     polyethylene glycol (Miralax) 17 gram/dose powder, Take by mouth., Disp: , Rfl:     predniSONE (Deltasone) 5 mg tablet, Take 1 tablet (5 mg) by mouth once daily., Disp: 90 tablet, Rfl: 3    Restasis 0.05 % ophthalmic emulsion, Administer 1 drop into both eyes every 12 hours., Disp: 180 each, Rfl: 3    rosuvastatin (Crestor) 5 mg tablet, Take 1 tablet (5 mg) by mouth once daily., Disp: 90 tablet, Rfl: 0    sertraline (Zoloft) 25 mg tablet, Take 1 tablet (25 mg) by mouth once daily., Disp: 30 tablet, Rfl: 1     Objective   Well appearing patient in no apparent distress; mood and affect are within normal limits.    A full examination was performed including scalp, face, eyes, ears, nose, lips, neck, chest, axillae, abdomen, back, bilateral upper extremities, and bilateral lower extremities. All findings within normal limits unless otherwise noted below.    Assessment/Plan   1. Inflamed seborrheic keratosis  Head - Anterior (Face)  On the patient's left preauricular cheek, there is a 1 cm erythematous and light brown-colored, hyperkeratotic, stuck-on appearing papule with a surrounding rim of erythema    Inflamed Seborrheic Keratosis -left preauricular cheek.  The benign nature of this lesion was discussed with the patient today and reassurance provided.  Given the history the patient provides of frequent irritation and associated symptoms as well as its inflamed appearance on exam today, I offered to treat this lesion with liquid nitrogen cryotherapy.  The patient expressed understanding, is in agreement  with this plan, and wishes to proceed with cryotherapy today.    Destr of lesion - Head - Anterior (Face)  Complexity: simple    Destruction method: cryotherapy    Informed consent: discussed and consent obtained    Lesion destroyed using liquid nitrogen: Yes    Cryotherapy cycles:  2  Outcome: patient tolerated procedure well with no complications    Post-procedure details: wound care instructions given      2. Actinic keratosis (16)  Head - Anterior (Face) (16)  Scattered on the patient's face, scalp, and bilateral ears, there are multiple erythematous, gritty, scaly macules     Actinic Keratoses -scattered on face, scalp, and bilateral ears.  The pre-cancerous nature of these lesions and treatment options were discussed with the patient today.  At this time, I recommend treatment with liquid nitrogen cryotherapy.  The patient expressed understanding, is in agreement with this plan, and wishes to proceed with cryotherapy today.    Destr of lesion - Head - Anterior (Face)  Complexity: simple    Destruction method: cryotherapy    Informed consent: discussed and consent obtained    Lesion destroyed using liquid nitrogen: Yes    Cryotherapy cycles:  1  Outcome: patient tolerated procedure well with no complications    Post-procedure details: wound care instructions given      3. Melanocytic nevus of trunk  Scattered on the patient's face, neck, trunk, and extremities, there are several small, round- to oval-shaped, brown-pigmented and pink-colored, symmetric, uniform-appearing macules and dome-shaped papules    Clinically benign- to slightly atypical-appearing nevi - the clinically benign- to slightly atypical-appearing nature of the patient's nevi was discussed with the patient today.  None of the patient's nevi meet threshold for biopsy today.  I emphasized the importance of performing monthly self-skin exams using the ABCDs of monitoring moles, which were reviewed with the patient today and an informational  hand-out provided.  I also emphasized the importance of sun avoidance and sun protection with daily sunscreen use.  The patient expressed understanding and is in agreement with this plan.    4. Seborrheic keratosis  Scattered on the patient's face, neck, trunk, and extremities, there are multiple tan- to light brown-colored, hyperkeratotic, stuck-on appearing papules of varying size and shape    Seborrheic Keratoses - the benign nature of these lesions was discussed with the patient today and reassurance provided.  No treatment is medically indicated for the noninflamed SKs at this time.    5. Seborrheic dermatitis  Head - Anterior (Face)  On the patient's face, mainly the glabella and bilateral eyebrows and perinasal creases, there are pink, scaly patches with whitish-yellowish, greasy scale    Seborrheic Dermatitis - flare on face.  The potentially chronic and intermittently flaring nature of this condition and treatment options were discussed extensively with the patient today.  At this time, I recommend topical anti-fungal therapy with Ketoconazole 2% cream, which the patient was instructed to apply twice daily to the affected areas of the face.  The risks, benefits, and side effects of this medication were discussed.  The patient expressed understanding and is in agreement with this plan.    ketoconazole (NIZOral) 2 % cream - Head - Anterior (Face)  Apply twice daily to affected areas of face    6. History of nonmelanoma skin cancer  On the patient's right lateral neck, left lateral eyebrow, right distal shoulder, and mid back, there are well-healed scars with no evidence of recurrent growth on exam today.    History of nonmelanoma skin cancers, dysplastic nevus, and actinic keratoses and photodamage.  There is no evidence of recurrence on exam today.  The signs and symptoms of skin cancer were reviewed and the patient was advised to practice sun protection and sun avoidance, use daily sunscreen, and perform  regular self skin exams.  I will have the patient return to our office in 4 to 6 months for routine follow-up and skin exam, and the patient was instructed to call our office should the patient notice any new, changing, symptomatic, or otherwise concerning skin lesions before then.  The patient and his wife expressed understanding and are in agreement with this plan.    7. Diffuse photodamage of skin  Photodistributed  Diffuse photodamage with actinic changes with telangiectasia and mottled pigmentation in sun-exposed areas.    Photodamage.  The signs and symptoms of skin cancer were reviewed and the patient was advised to practice sun protection and sun avoidance, use daily sunscreen, and perform regular self skin exams.  Sun protection was discussed, including avoiding the mid-day sun, wearing a sunscreen with SPF at least 50, and stressing the need for reapplication of sunscreen and applying more than they think they need.

## 2024-03-26 ENCOUNTER — OFFICE VISIT (OUTPATIENT)
Dept: DERMATOLOGY | Facility: CLINIC | Age: 79
End: 2024-03-26
Payer: MEDICARE

## 2024-03-26 DIAGNOSIS — L82.0 INFLAMED SEBORRHEIC KERATOSIS: Primary | ICD-10-CM

## 2024-03-26 DIAGNOSIS — L57.0 ACTINIC KERATOSIS: ICD-10-CM

## 2024-03-26 DIAGNOSIS — L82.1 SEBORRHEIC KERATOSIS: ICD-10-CM

## 2024-03-26 DIAGNOSIS — L57.8 DIFFUSE PHOTODAMAGE OF SKIN: ICD-10-CM

## 2024-03-26 DIAGNOSIS — Z85.828 HISTORY OF NONMELANOMA SKIN CANCER: ICD-10-CM

## 2024-03-26 DIAGNOSIS — L21.9 SEBORRHEIC DERMATITIS: ICD-10-CM

## 2024-03-26 DIAGNOSIS — D22.5 MELANOCYTIC NEVUS OF TRUNK: ICD-10-CM

## 2024-03-26 PROCEDURE — 1159F MED LIST DOCD IN RCRD: CPT | Performed by: DERMATOLOGY

## 2024-03-26 PROCEDURE — 1160F RVW MEDS BY RX/DR IN RCRD: CPT | Performed by: DERMATOLOGY

## 2024-03-26 PROCEDURE — 17110 DESTRUCTION B9 LES UP TO 14: CPT | Performed by: DERMATOLOGY

## 2024-03-26 PROCEDURE — 1036F TOBACCO NON-USER: CPT | Performed by: DERMATOLOGY

## 2024-03-26 PROCEDURE — 1157F ADVNC CARE PLAN IN RCRD: CPT | Performed by: DERMATOLOGY

## 2024-03-26 PROCEDURE — 99214 OFFICE O/P EST MOD 30 MIN: CPT | Performed by: DERMATOLOGY

## 2024-03-26 PROCEDURE — 17004 DESTROY PREMAL LESIONS 15/>: CPT | Performed by: DERMATOLOGY

## 2024-03-26 RX ORDER — KETOCONAZOLE 20 MG/G
CREAM TOPICAL
Qty: 60 G | Refills: 11 | Status: SHIPPED | OUTPATIENT
Start: 2024-03-26

## 2024-03-26 ASSESSMENT — DERMATOLOGY PATIENT ASSESSMENT
DO YOU USE A TANNING BED: NO
DO YOU USE SUNSCREEN: OCCASIONALLY
DO YOU HAVE ANY NEW OR CHANGING LESIONS: YES

## 2024-03-26 ASSESSMENT — DERMATOLOGY QUALITY OF LIFE (QOL) ASSESSMENT: ARE THERE EXCLUSIONS OR EXCEPTIONS FOR THE QUALITY OF LIFE ASSESSMENT: NO

## 2024-03-29 ENCOUNTER — INFUSION (OUTPATIENT)
Dept: INFUSION THERAPY | Facility: HOSPITAL | Age: 79
End: 2024-03-29
Payer: MEDICARE

## 2024-03-29 VITALS
DIASTOLIC BLOOD PRESSURE: 89 MMHG | HEART RATE: 76 BPM | OXYGEN SATURATION: 94 % | RESPIRATION RATE: 18 BRPM | WEIGHT: 166.45 LBS | TEMPERATURE: 98.8 F | BODY MASS INDEX: 24.58 KG/M2 | SYSTOLIC BLOOD PRESSURE: 144 MMHG

## 2024-03-29 DIAGNOSIS — G61.81 CHRONIC INFLAMMATORY DEMYELINATING POLYNEURITIS (MULTI): Primary | ICD-10-CM

## 2024-03-29 LAB
ALBUMIN SERPL BCP-MCNC: 3.9 G/DL (ref 3.4–5)
ALP SERPL-CCNC: 67 U/L (ref 33–136)
ALT SERPL W P-5'-P-CCNC: 5 U/L (ref 10–52)
ANION GAP SERPL CALC-SCNC: 11 MMOL/L (ref 10–20)
AST SERPL W P-5'-P-CCNC: 20 U/L (ref 9–39)
BASOPHILS # BLD AUTO: 0.01 X10*3/UL (ref 0–0.1)
BASOPHILS NFR BLD AUTO: 0.3 %
BILIRUB SERPL-MCNC: 0.5 MG/DL (ref 0–1.2)
BUN SERPL-MCNC: 11 MG/DL (ref 6–23)
CALCIUM SERPL-MCNC: 9.1 MG/DL (ref 8.6–10.3)
CHLORIDE SERPL-SCNC: 101 MMOL/L (ref 98–107)
CO2 SERPL-SCNC: 29 MMOL/L (ref 21–32)
CREAT SERPL-MCNC: 0.76 MG/DL (ref 0.5–1.3)
EGFRCR SERPLBLD CKD-EPI 2021: >90 ML/MIN/1.73M*2
EOSINOPHIL # BLD AUTO: 0.03 X10*3/UL (ref 0–0.4)
EOSINOPHIL NFR BLD AUTO: 0.8 %
ERYTHROCYTE [DISTWIDTH] IN BLOOD BY AUTOMATED COUNT: 13 % (ref 11.5–14.5)
GLUCOSE SERPL-MCNC: 84 MG/DL (ref 74–99)
HCT VFR BLD AUTO: 38.5 % (ref 41–52)
HGB BLD-MCNC: 13.2 G/DL (ref 13.5–17.5)
IMM GRANULOCYTES # BLD AUTO: 0.01 X10*3/UL (ref 0–0.5)
IMM GRANULOCYTES NFR BLD AUTO: 0.3 % (ref 0–0.9)
LYMPHOCYTES # BLD AUTO: 1.34 X10*3/UL (ref 0.8–3)
LYMPHOCYTES NFR BLD AUTO: 34 %
MCH RBC QN AUTO: 32 PG (ref 26–34)
MCHC RBC AUTO-ENTMCNC: 34.3 G/DL (ref 32–36)
MCV RBC AUTO: 93 FL (ref 80–100)
MONOCYTES # BLD AUTO: 0.43 X10*3/UL (ref 0.05–0.8)
MONOCYTES NFR BLD AUTO: 10.9 %
NEUTROPHILS # BLD AUTO: 2.12 X10*3/UL (ref 1.6–5.5)
NEUTROPHILS NFR BLD AUTO: 53.7 %
NRBC BLD-RTO: 0 /100 WBCS (ref 0–0)
PLATELET # BLD AUTO: 149 X10*3/UL (ref 150–450)
POTASSIUM SERPL-SCNC: 4.3 MMOL/L (ref 3.5–5.3)
PROT SERPL-MCNC: 7 G/DL (ref 6.4–8.2)
RBC # BLD AUTO: 4.12 X10*6/UL (ref 4.5–5.9)
SODIUM SERPL-SCNC: 137 MMOL/L (ref 136–145)
WBC # BLD AUTO: 3.9 X10*3/UL (ref 4.4–11.3)

## 2024-03-29 PROCEDURE — 80053 COMPREHEN METABOLIC PANEL: CPT

## 2024-03-29 PROCEDURE — 2500000004 HC RX 250 GENERAL PHARMACY W/ HCPCS (ALT 636 FOR OP/ED): Mod: JZ | Performed by: PSYCHIATRY & NEUROLOGY

## 2024-03-29 PROCEDURE — 2500000001 HC RX 250 WO HCPCS SELF ADMINISTERED DRUGS (ALT 637 FOR MEDICARE OP): Performed by: PSYCHIATRY & NEUROLOGY

## 2024-03-29 PROCEDURE — 96365 THER/PROPH/DIAG IV INF INIT: CPT | Mod: INF

## 2024-03-29 PROCEDURE — 96366 THER/PROPH/DIAG IV INF ADDON: CPT | Mod: INF

## 2024-03-29 PROCEDURE — 85025 COMPLETE CBC W/AUTO DIFF WBC: CPT

## 2024-03-29 RX ORDER — HEPARIN SODIUM,PORCINE/PF 10 UNIT/ML
50 SYRINGE (ML) INTRAVENOUS AS NEEDED
Status: CANCELLED | OUTPATIENT
Start: 2024-03-29

## 2024-03-29 RX ORDER — HEPARIN 100 UNIT/ML
500 SYRINGE INTRAVENOUS AS NEEDED
Status: CANCELLED | OUTPATIENT
Start: 2024-03-29

## 2024-03-29 RX ORDER — ACETAMINOPHEN 325 MG/1
650 TABLET ORAL ONCE
Status: COMPLETED | OUTPATIENT
Start: 2024-03-29 | End: 2024-03-29

## 2024-03-29 RX ORDER — ACETAMINOPHEN 325 MG/1
650 TABLET ORAL ONCE
Status: CANCELLED | OUTPATIENT
Start: 2024-04-19 | End: 2024-04-19

## 2024-03-29 RX ORDER — DIPHENHYDRAMINE HCL 25 MG
25 CAPSULE ORAL ONCE
Status: CANCELLED | OUTPATIENT
Start: 2024-04-19 | End: 2024-04-19

## 2024-03-29 RX ORDER — HEPARIN 100 UNIT/ML
500 SYRINGE INTRAVENOUS AS NEEDED
Status: DISCONTINUED | OUTPATIENT
Start: 2024-03-29 | End: 2024-03-29 | Stop reason: HOSPADM

## 2024-03-29 RX ORDER — DIPHENHYDRAMINE HCL 25 MG
25 CAPSULE ORAL ONCE
Status: COMPLETED | OUTPATIENT
Start: 2024-03-29 | End: 2024-03-29

## 2024-03-29 RX ADMIN — IMMUNE GLOBULIN (HUMAN) 70 G: 10 INJECTION INTRAVENOUS; SUBCUTANEOUS at 09:19

## 2024-03-29 RX ADMIN — Medication 500 UNITS: at 12:56

## 2024-03-29 RX ADMIN — DIPHENHYDRAMINE HYDROCHLORIDE 25 MG: 25 CAPSULE ORAL at 08:30

## 2024-03-29 RX ADMIN — ACETAMINOPHEN 650 MG: 325 TABLET ORAL at 08:30

## 2024-03-29 ASSESSMENT — PAIN SCALES - GENERAL: PAINLEVEL: 0-NO PAIN

## 2024-03-29 ASSESSMENT — ENCOUNTER SYMPTOMS
DEPRESSION: 0
OCCASIONAL FEELINGS OF UNSTEADINESS: 1
LOSS OF SENSATION IN FEET: 0

## 2024-03-29 ASSESSMENT — PATIENT HEALTH QUESTIONNAIRE - PHQ9
2. FEELING DOWN, DEPRESSED OR HOPELESS: NOT AT ALL
1. LITTLE INTEREST OR PLEASURE IN DOING THINGS: NOT AT ALL
SUM OF ALL RESPONSES TO PHQ9 QUESTIONS 1 AND 2: 0

## 2024-03-29 ASSESSMENT — PAIN - FUNCTIONAL ASSESSMENT: PAIN_FUNCTIONAL_ASSESSMENT: 0-10

## 2024-04-03 ENCOUNTER — OFFICE VISIT (OUTPATIENT)
Dept: ORTHOPEDIC SURGERY | Facility: CLINIC | Age: 79
End: 2024-04-03
Payer: MEDICARE

## 2024-04-03 ENCOUNTER — HOSPITAL ENCOUNTER (OUTPATIENT)
Dept: RADIOLOGY | Facility: CLINIC | Age: 79
Discharge: HOME | End: 2024-04-03
Payer: MEDICARE

## 2024-04-03 DIAGNOSIS — M25.562 CHRONIC PAIN OF LEFT KNEE: ICD-10-CM

## 2024-04-03 DIAGNOSIS — G89.29 CHRONIC PAIN OF LEFT KNEE: ICD-10-CM

## 2024-04-03 PROCEDURE — 1160F RVW MEDS BY RX/DR IN RCRD: CPT | Performed by: ORTHOPAEDIC SURGERY

## 2024-04-03 PROCEDURE — 73564 X-RAY EXAM KNEE 4 OR MORE: CPT | Mod: LEFT SIDE | Performed by: RADIOLOGY

## 2024-04-03 PROCEDURE — 1036F TOBACCO NON-USER: CPT | Performed by: ORTHOPAEDIC SURGERY

## 2024-04-03 PROCEDURE — 1159F MED LIST DOCD IN RCRD: CPT | Performed by: ORTHOPAEDIC SURGERY

## 2024-04-03 PROCEDURE — 73564 X-RAY EXAM KNEE 4 OR MORE: CPT | Mod: LT

## 2024-04-03 PROCEDURE — 20610 DRAIN/INJ JOINT/BURSA W/O US: CPT | Performed by: ORTHOPAEDIC SURGERY

## 2024-04-03 PROCEDURE — 1157F ADVNC CARE PLAN IN RCRD: CPT | Performed by: ORTHOPAEDIC SURGERY

## 2024-04-03 PROCEDURE — 99214 OFFICE O/P EST MOD 30 MIN: CPT | Performed by: ORTHOPAEDIC SURGERY

## 2024-04-03 RX ORDER — TRIAMCINOLONE ACETONIDE 40 MG/ML
1 INJECTION, SUSPENSION INTRA-ARTICULAR; INTRAMUSCULAR
Status: COMPLETED | OUTPATIENT
Start: 2024-04-03 | End: 2024-04-03

## 2024-04-03 RX ADMIN — TRIAMCINOLONE ACETONIDE 1 ML: 40 INJECTION, SUSPENSION INTRA-ARTICULAR; INTRAMUSCULAR at 11:22

## 2024-04-03 NOTE — PROGRESS NOTES
This is a consultation from Dr. Ileana Bro DO for   Chief Complaint   Patient presents with    Left Knee - Pain       This is a 78 y.o. male who presents for follow-up for his left knee.  Patient is left knee arthritis, had a cortisone injection back in September last year.  He did very well and had excellent relief.  In the last couple of weeks has had return of symptoms exacerbation of his chronic pain.  Sharp stabbing pain over the medial knee worse with walking.  Uses a walker.  He has a difficult time when he sits for a while specially during his infusions.  No other new issues or symptoms.    Physical Exam    There has been no interval change in this patient's past medical, surgical, medications, allergies, family history or social history since the most recent visit to a provider within our department. 14 point review of systems was performed, reviewed, and negative except for pertinent positives documented in the history of present illness.     Constitutional: well developed, well nourished male in no acute distress  Psychiatric: normal mood, appropriate affect  Eyes: sclera anicteric  HENT: normocephalic/atraumatic  CV: regular rate and rhythm   Respiratory: non labored breathing  Integumentary: no rash  Neurological: moves all extremities    Left knee exam: skin intact no lacerations or abrations.  1+ effusion.  Tender medial joint line. negative log roll negative patellar grind. ROM 0-120. stable to varus and valgus stress at 0 and 30 degrees. negative lachman negative posterior drawer negative karen. 5/5 ehl/fhl/gs/ta. silt s/s/sp/dp/t. 2+ dp/pt        Xrays were ordered by me, they were reviewed and independently interpreted by me today, they show severe degenerative disease    L Inj/Asp: L knee on 4/3/2024 11:22 AM  Indications: pain and joint swelling  Details: 22 G needle, anterolateral approach  Medications: 1 mL triamcinolone acetonide 40 mg/mL    Discussion:  I discussed the  conservative treatment options for knee osteoarthritis including but not limited to physical therapy, oral NSAIDS, activity and lifestyle modification, and corticosteroid injections. Pt has elected to undergo a cortisone injection today. I have explained the risk and benefits of an injection including the possibility of joint infection, bleeding, damage to cartilage, allergic reaction. Patient verbalized understanding and gave verbal consent wishes to proceed with a intra-articular cortisone injection for their knee.    Procedure:  After discussing the risk and benefits of the procedure, we proceeded with an intra-articular left knee injection. We discussed the risks and benefits and potential morbidity related to the treatment, and to the prescription medication administered in the injection    With the patient's informed verbal consent, the left knee was prepped in standard sterile fashion with Chlorhexidine. The skin was then anesthetized with ethyl chloride spray and cleaned again with Chlorhexidine. The knee was then apirated/injected with a prefilled 20-gauge syringe of 40 mg Kenalog + 4 ml Lidocaine using the lateral approach without complications.  The patient tolerated this well and felt immediate initial relief of symptoms. A bandaid was applied and the patient ambulated out of the clinic on ther own accord without difficulty. Patient was instructed to avoid physical activity for 24-48 hours to prevent the knees from swelling and may ice the knees as tolerated. Patient should contact the office if any signs of of infection appear: redness, fever, chills, drainage, swelling or warmth to the knees.  Pt understands that the injections can be repeated no sooner than 3 months.  Procedure, treatment alternatives, risks and benefits explained, specific risks discussed. Consent was given by the patient. Immediately prior to procedure a time out was called to verify the correct patient, procedure, equipment,  "support staff and site/side marked as required. Patient was prepped and draped in the usual sterile fashion.             Impression/Plan: This is a 78 y.o. male with left knee arthritis.  I had an in depth discussion with the patient regarding treatment options for arthritis and their relative risks and benefits. We reviewed surgical and nonsurgical option for treatment. Treatments include anti inflammatory medications, physical therapy, weight loss, activity modification, use of assistive devices, injection therapies. We discussed current prescriptions and risks and benefits of continuation of prescription medication as apporpriate. We discussed that arthritis is often progressive over time, an in end stage arthritis surgical interventions can be considered, including arthroplasty. All questions were answered and the patient voiced their understanding.  Continue nonsurgical management I will see him back as needed    BMI Readings from Last 1 Encounters:   03/29/24 24.58 kg/m²      Lab Results   Component Value Date    CREATININE 0.76 03/29/2024     Tobacco Use: Low Risk  (3/29/2024)    Patient History     Smoking Tobacco Use: Never     Smokeless Tobacco Use: Never     Passive Exposure: Past      Computed MELD 3.0 unavailable. Necessary lab results were not found in the last year.  Computed MELD-Na unavailable. Necessary lab results were not found in the last year.       Lab Results   Component Value Date    HGBA1C 5.1 02/13/2020     No results found for: \"STAPHMRSASCR\"  "

## 2024-04-11 ENCOUNTER — APPOINTMENT (OUTPATIENT)
Dept: NEUROLOGY | Facility: CLINIC | Age: 79
End: 2024-04-11
Payer: MEDICARE

## 2024-04-12 ENCOUNTER — OFFICE VISIT (OUTPATIENT)
Dept: NEUROLOGY | Facility: CLINIC | Age: 79
End: 2024-04-12
Payer: MEDICARE

## 2024-04-12 ENCOUNTER — APPOINTMENT (OUTPATIENT)
Dept: INFUSION THERAPY | Facility: HOSPITAL | Age: 79
End: 2024-04-12
Payer: MEDICARE

## 2024-04-12 DIAGNOSIS — M04.9 AUTOINFLAMMATORY SYNDROME, UNSPECIFIED (MULTI): ICD-10-CM

## 2024-04-12 DIAGNOSIS — G62.9 NEUROPATHY: Primary | ICD-10-CM

## 2024-04-12 DIAGNOSIS — E74.89 OTHER SPECIFIED DISORDERS OF CARBOHYDRATE METABOLISM (MULTI): ICD-10-CM

## 2024-04-12 DIAGNOSIS — E78.89 OTHER LIPOPROTEIN METABOLISM DISORDERS: ICD-10-CM

## 2024-04-12 PROCEDURE — 99213 OFFICE O/P EST LOW 20 MIN: CPT | Performed by: PSYCHIATRY & NEUROLOGY

## 2024-04-12 PROCEDURE — 1159F MED LIST DOCD IN RCRD: CPT | Performed by: PSYCHIATRY & NEUROLOGY

## 2024-04-12 PROCEDURE — 1157F ADVNC CARE PLAN IN RCRD: CPT | Performed by: PSYCHIATRY & NEUROLOGY

## 2024-04-12 PROCEDURE — 1160F RVW MEDS BY RX/DR IN RCRD: CPT | Performed by: PSYCHIATRY & NEUROLOGY

## 2024-04-12 NOTE — PROGRESS NOTES
Date of Service: 4/12/2024  Patient: Wade Montgomery  MRN: [id number]  Referring Provider: No ref. provider found  Primary Care Physician: Ileana Bro DO     History of Present Illness:    Mr. Montgomery is a 78 y.o. male with history of Parkinson's Disease and CIDP who presents for follow up of CIDP.    Started on zoloft and feels it might be affecting balance. They plan to discuss with  regarding changing to a different medication.     Still doing exercise almost daily.    Follow-up CT scan with contrast in a couple weeks.    One time had a fever with IVIG; it was the time before last; fever lasted until 9PM that day. With last infusion of IVIG, she did not have a fever.     Problem List Items Addressed This Visit    None  Visit Diagnoses       Neuropathy    -  Primary    Relevant Orders    Hemoglobin A1C    Vitamin B12    Comprehensive Metabolic Panel    Vitamin B6    Methylmalonic Acid    Homocysteine, serum    TSH with reflex to Free T4 if abnormal    Other specified disorders of carbohydrate metabolism (Multi)        Relevant Orders    Hemoglobin A1C    Other lipoprotein metabolism disorders        Relevant Orders    Homocysteine, serum    Autoinflammatory syndrome, unspecified (Multi)        Relevant Orders    TSH with reflex to Free T4 if abnormal            Allergies   Allergen Reactions    Oxycodone Other and Hallucinations     Psychosis    Penicillin V Other        Medications:    Current Outpatient Medications:     acetaminophen (TYLENOL ORAL), if needed., Disp: , Rfl:     aspirin 81 mg EC tablet, Take 1 tablet (81 mg) by mouth once daily., Disp: , Rfl:     carbidopa-levodopa (Sinemet)  mg tablet, Take 1.5 tabs TID, Disp: 405 tablet, Rfl: 3    carboxymethylcellulose (Refresh Plus) 0.5 % ophthalmic solution, Administer into affected eye(s) 4 times a day., Disp: , Rfl:     diclofenac sodium (Voltaren) 1 % gel gel, Apply 4.5 inches (4 g) topically 4 times a day.  APPLY TO LOWER  EXTREMITIES. DO NOT APPLY MORE THAN 16 GM DAILY TO ANY ONE AFFECTED JOINT., Disp: , Rfl:     diphenhydramine HCl (BENADRYL ORAL), if needed., Disp: , Rfl:     docusate sodium (Colace) 100 mg capsule, Take 2 capsules (200 mg) by mouth once daily., Disp: , Rfl:     fluticasone (Flonase) 50 mcg/actuation nasal spray, Administer 2 sprays into each nostril if needed., Disp: , Rfl:     ibuprofen 600 mg tablet, Take 1 tablet (600 mg) by mouth every 6 hours if needed. States takes 200 mg, Disp: , Rfl:     immune globul G-gly-IgA avg 46 (Gamunex-C) 20 gram/200 mL (10 %) solution, Infuse 70 gm divided over 1 days every 3 weeks x 6 months. Premedicate with 650 mg acetaminophen PO, 25 mg diphenhydramine PO, and 100 mg hydrocortisone IV push., Disp: , Rfl:     ketoconazole (NIZOral) 2 % cream, Apply twice daily to affected areas of face, Disp: 60 g, Rfl: 11    ketoconazole (NIZOral) 2 % shampoo, Ketoconazole 2 % External Shampoo Quantity: 120  Refills: 0  Start: 15-Sep-2021, Disp: , Rfl:     melatonin 5 mg tablet, Take by mouth., Disp: , Rfl:     multivitamin tablet, Take 1 tablet by mouth once daily. Take with food, Disp: , Rfl:     polyethylene glycol (Miralax) 17 gram/dose powder, Take by mouth., Disp: , Rfl:     predniSONE (Deltasone) 5 mg tablet, Take 1 tablet (5 mg) by mouth once daily., Disp: 90 tablet, Rfl: 3    Restasis 0.05 % ophthalmic emulsion, Administer 1 drop into both eyes every 12 hours., Disp: 180 each, Rfl: 3    rosuvastatin (Crestor) 5 mg tablet, Take 1 tablet (5 mg) by mouth once daily., Disp: 90 tablet, Rfl: 0    sertraline (Zoloft) 25 mg tablet, Take 1 tablet (25 mg) by mouth once daily., Disp: 30 tablet, Rfl: 1     Physical Exam:     General: Stooped posture.     Cranial nerves: Blinking frequency seems to be reduced. Facial expression decreased.     Motor:     R L   4 4 Shoulder abduction  5 5 Elbow flexion  5 5 Elbow extension  5 5 Wrist extension  5 5 Wrist flexion  4+ 4+ Finger flexion  4 4 Finger  extension  5 4 Finger abduction  5- 5- Hip flexion  5 5 Knee flexion  5 5 Knee extension  5 5 Ankle dorsiflexion  5 5 Ankle plantarflexion     Sensory:  Intact to sharp in the distal upper and lower extremities, decreased pinprick of at least 70 percent in the left calf, 90 percent in the right calf     Gait:  Stands slowly. Difficult with fingertaps.      Results:     The following labs, imaging, and results were personally reviewed and demonstrated:    Labs:  Lab Results   Component Value Date    HGBA1C 5.1 02/13/2020       Lab Results   Component Value Date    DYWLYXTU97 627 08/19/2019       Lab Results   Component Value Date    SPEP Aberrant band detected. See immunofixation. 02/12/2024     CBC:   Lab Results   Component Value Date    WBC 4.7 04/19/2024    HGB 13.2 (L) 04/19/2024    HCT 39.7 (L) 04/19/2024     (L) 04/19/2024     BMP:   Lab Results   Component Value Date     04/19/2024    K 4.2 04/19/2024     04/19/2024    CO2 29 04/19/2024    BUN 13 04/19/2024    CREATININE 0.74 04/19/2024    CALCIUM 8.9 04/19/2024    MG 2.01 12/28/2020    PHOS 2.4 (L) 12/28/2020     LFT:   Lab Results   Component Value Date    ALKPHOS 65 04/19/2024    BILITOT 0.7 04/19/2024    BILIDIR 0.3 12/28/2020    PROT 7.2 04/19/2024    ALBUMIN 3.9 04/19/2024    ALT 4 (L) 04/19/2024    AST 18 04/19/2024     Impression/Plan:     Impression:  Wade Montgomery is a 78 y.o. who presents with Parkinson's Disease and CIDP. He gets better with IVIG and still sees benefit with it. Will need to continue IVIG which is controlling CIDP.     Plan:  -- continue IVIG for CIDP  -- neuropathy lab work-up  -- vertical lift script needed  Orders Placed This Encounter   Procedures    Hemoglobin A1C     Standing Status:   Future     Standing Expiration Date:   4/12/2025     Order Specific Question:   Release result to [facility]     Answer:   Immediate [1]    Vitamin B12     Standing Status:   Future     Standing Expiration Date:   4/12/2025      Order Specific Question:   Release result to MyChart     Answer:   Immediate [1]    Comprehensive Metabolic Panel     Standing Status:   Future     Standing Expiration Date:   4/12/2025     Order Specific Question:   Release result to MyChart     Answer:   Immediate [1]    Vitamin B6     Standing Status:   Future     Standing Expiration Date:   4/12/2025     Order Specific Question:   Release result to MyChart     Answer:   Immediate [1]    Methylmalonic Acid     Standing Status:   Future     Standing Expiration Date:   4/12/2025     Order Specific Question:   Release result to MyChart     Answer:   Immediate [1]    Homocysteine, serum     Standing Status:   Future     Standing Expiration Date:   4/12/2025     Order Specific Question:   Release result to MyChart     Answer:   Immediate [1]    TSH with reflex to Free T4 if abnormal     Standing Status:   Future     Standing Expiration Date:   4/12/2025     Order Specific Question:   Release result to MyChart     Answer:   Immediate [1]        Reviewed and approved by BRIDGET SOTO on 4/26/24 at 11:36 PM.    I personally spent 25 minutes on the day of the visit completing the review of the medical record and outside records, obtaining history and performing an appropriate physical exam, patient care, counseling and education, placing orders, independently reviewing results, communicating with the patient/family, coordinating care.

## 2024-04-19 ENCOUNTER — INFUSION (OUTPATIENT)
Dept: INFUSION THERAPY | Facility: HOSPITAL | Age: 79
End: 2024-04-19
Payer: MEDICARE

## 2024-04-19 VITALS
TEMPERATURE: 97.9 F | DIASTOLIC BLOOD PRESSURE: 76 MMHG | OXYGEN SATURATION: 96 % | HEART RATE: 74 BPM | WEIGHT: 163.58 LBS | SYSTOLIC BLOOD PRESSURE: 126 MMHG | BODY MASS INDEX: 24.16 KG/M2 | RESPIRATION RATE: 16 BRPM

## 2024-04-19 DIAGNOSIS — G61.81 CHRONIC INFLAMMATORY DEMYELINATING POLYNEURITIS (MULTI): ICD-10-CM

## 2024-04-19 LAB
ALBUMIN SERPL BCP-MCNC: 3.9 G/DL (ref 3.4–5)
ALP SERPL-CCNC: 65 U/L (ref 33–136)
ALT SERPL W P-5'-P-CCNC: 4 U/L (ref 10–52)
ANION GAP SERPL CALC-SCNC: 11 MMOL/L (ref 10–20)
AST SERPL W P-5'-P-CCNC: 18 U/L (ref 9–39)
BASOPHILS # BLD AUTO: 0.01 X10*3/UL (ref 0–0.1)
BASOPHILS NFR BLD AUTO: 0.2 %
BILIRUB SERPL-MCNC: 0.7 MG/DL (ref 0–1.2)
BUN SERPL-MCNC: 13 MG/DL (ref 6–23)
CALCIUM SERPL-MCNC: 8.9 MG/DL (ref 8.6–10.3)
CHLORIDE SERPL-SCNC: 100 MMOL/L (ref 98–107)
CO2 SERPL-SCNC: 29 MMOL/L (ref 21–32)
CREAT SERPL-MCNC: 0.74 MG/DL (ref 0.5–1.3)
EGFRCR SERPLBLD CKD-EPI 2021: >90 ML/MIN/1.73M*2
EOSINOPHIL # BLD AUTO: 0.04 X10*3/UL (ref 0–0.4)
EOSINOPHIL NFR BLD AUTO: 0.9 %
ERYTHROCYTE [DISTWIDTH] IN BLOOD BY AUTOMATED COUNT: 12.6 % (ref 11.5–14.5)
GLUCOSE SERPL-MCNC: 86 MG/DL (ref 74–99)
HCT VFR BLD AUTO: 39.7 % (ref 41–52)
HGB BLD-MCNC: 13.2 G/DL (ref 13.5–17.5)
IMM GRANULOCYTES # BLD AUTO: 0.01 X10*3/UL (ref 0–0.5)
IMM GRANULOCYTES NFR BLD AUTO: 0.2 % (ref 0–0.9)
LYMPHOCYTES # BLD AUTO: 1.45 X10*3/UL (ref 0.8–3)
LYMPHOCYTES NFR BLD AUTO: 31 %
MCH RBC QN AUTO: 31.3 PG (ref 26–34)
MCHC RBC AUTO-ENTMCNC: 33.2 G/DL (ref 32–36)
MCV RBC AUTO: 94 FL (ref 80–100)
MONOCYTES # BLD AUTO: 0.58 X10*3/UL (ref 0.05–0.8)
MONOCYTES NFR BLD AUTO: 12.4 %
NEUTROPHILS # BLD AUTO: 2.59 X10*3/UL (ref 1.6–5.5)
NEUTROPHILS NFR BLD AUTO: 55.3 %
NRBC BLD-RTO: 0 /100 WBCS (ref 0–0)
PLATELET # BLD AUTO: 140 X10*3/UL (ref 150–450)
POTASSIUM SERPL-SCNC: 4.2 MMOL/L (ref 3.5–5.3)
PROT SERPL-MCNC: 7.2 G/DL (ref 6.4–8.2)
RBC # BLD AUTO: 4.22 X10*6/UL (ref 4.5–5.9)
SODIUM SERPL-SCNC: 136 MMOL/L (ref 136–145)
WBC # BLD AUTO: 4.7 X10*3/UL (ref 4.4–11.3)

## 2024-04-19 PROCEDURE — 96365 THER/PROPH/DIAG IV INF INIT: CPT | Mod: INF

## 2024-04-19 PROCEDURE — 96366 THER/PROPH/DIAG IV INF ADDON: CPT | Mod: INF

## 2024-04-19 PROCEDURE — 84075 ASSAY ALKALINE PHOSPHATASE: CPT

## 2024-04-19 PROCEDURE — 2500000001 HC RX 250 WO HCPCS SELF ADMINISTERED DRUGS (ALT 637 FOR MEDICARE OP): Performed by: PSYCHIATRY & NEUROLOGY

## 2024-04-19 PROCEDURE — 85025 COMPLETE CBC W/AUTO DIFF WBC: CPT

## 2024-04-19 PROCEDURE — 2500000004 HC RX 250 GENERAL PHARMACY W/ HCPCS (ALT 636 FOR OP/ED): Mod: JZ | Performed by: PSYCHIATRY & NEUROLOGY

## 2024-04-19 RX ORDER — ACETAMINOPHEN 325 MG/1
650 TABLET ORAL ONCE
Status: CANCELLED | OUTPATIENT
Start: 2024-05-10 | End: 2024-05-10

## 2024-04-19 RX ORDER — DIPHENHYDRAMINE HCL 25 MG
25 CAPSULE ORAL ONCE
Status: CANCELLED | OUTPATIENT
Start: 2024-05-10 | End: 2024-05-10

## 2024-04-19 RX ORDER — HEPARIN 100 UNIT/ML
500 SYRINGE INTRAVENOUS AS NEEDED
Status: DISCONTINUED | OUTPATIENT
Start: 2024-04-19 | End: 2024-04-19 | Stop reason: HOSPADM

## 2024-04-19 RX ORDER — HEPARIN SODIUM,PORCINE/PF 10 UNIT/ML
50 SYRINGE (ML) INTRAVENOUS AS NEEDED
Status: CANCELLED | OUTPATIENT
Start: 2024-04-19

## 2024-04-19 RX ORDER — DIPHENHYDRAMINE HCL 25 MG
25 CAPSULE ORAL ONCE
Status: COMPLETED | OUTPATIENT
Start: 2024-04-19 | End: 2024-04-19

## 2024-04-19 RX ORDER — HEPARIN 100 UNIT/ML
500 SYRINGE INTRAVENOUS AS NEEDED
Status: CANCELLED | OUTPATIENT
Start: 2024-04-19

## 2024-04-19 RX ORDER — ACETAMINOPHEN 325 MG/1
650 TABLET ORAL ONCE
Status: COMPLETED | OUTPATIENT
Start: 2024-04-19 | End: 2024-04-19

## 2024-04-19 RX ADMIN — ACETAMINOPHEN 650 MG: 325 TABLET ORAL at 08:46

## 2024-04-19 RX ADMIN — DIPHENHYDRAMINE HYDROCHLORIDE 25 MG: 25 CAPSULE ORAL at 08:46

## 2024-04-19 RX ADMIN — IMMUNE GLOBULIN (HUMAN) 70 G: 10 INJECTION INTRAVENOUS; SUBCUTANEOUS at 09:56

## 2024-04-19 RX ADMIN — Medication 500 UNITS: at 13:26

## 2024-04-19 ASSESSMENT — ENCOUNTER SYMPTOMS
OCCASIONAL FEELINGS OF UNSTEADINESS: 1
DEPRESSION: 0
LOSS OF SENSATION IN FEET: 0

## 2024-04-19 ASSESSMENT — PATIENT HEALTH QUESTIONNAIRE - PHQ9
10. IF YOU CHECKED OFF ANY PROBLEMS, HOW DIFFICULT HAVE THESE PROBLEMS MADE IT FOR YOU TO DO YOUR WORK, TAKE CARE OF THINGS AT HOME, OR GET ALONG WITH OTHER PEOPLE: NOT DIFFICULT AT ALL
SUM OF ALL RESPONSES TO PHQ9 QUESTIONS 1 AND 2: 1
1. LITTLE INTEREST OR PLEASURE IN DOING THINGS: NOT AT ALL
2. FEELING DOWN, DEPRESSED OR HOPELESS: SEVERAL DAYS

## 2024-04-23 ENCOUNTER — TELEPHONE (OUTPATIENT)
Dept: PRIMARY CARE | Facility: CLINIC | Age: 79
End: 2024-04-23
Payer: MEDICARE

## 2024-04-24 ENCOUNTER — HOSPITAL ENCOUNTER (OUTPATIENT)
Dept: RADIOLOGY | Facility: CLINIC | Age: 79
Discharge: HOME | End: 2024-04-24
Payer: MEDICARE

## 2024-04-24 ENCOUNTER — INFUSION (OUTPATIENT)
Dept: HEMATOLOGY/ONCOLOGY | Facility: CLINIC | Age: 79
End: 2024-04-24
Payer: MEDICARE

## 2024-04-24 VITALS
HEART RATE: 70 BPM | DIASTOLIC BLOOD PRESSURE: 90 MMHG | OXYGEN SATURATION: 96 % | SYSTOLIC BLOOD PRESSURE: 151 MMHG | TEMPERATURE: 97.2 F | RESPIRATION RATE: 18 BRPM

## 2024-04-24 DIAGNOSIS — G61.81 CHRONIC INFLAMMATORY DEMYELINATING POLYNEURITIS (MULTI): ICD-10-CM

## 2024-04-24 DIAGNOSIS — C67.9 MALIGNANT NEOPLASM OF URINARY BLADDER, UNSPECIFIED SITE (MULTI): ICD-10-CM

## 2024-04-24 PROCEDURE — 96523 IRRIG DRUG DELIVERY DEVICE: CPT

## 2024-04-24 PROCEDURE — 74177 CT ABD & PELVIS W/CONTRAST: CPT

## 2024-04-24 PROCEDURE — 71260 CT THORAX DX C+: CPT | Performed by: STUDENT IN AN ORGANIZED HEALTH CARE EDUCATION/TRAINING PROGRAM

## 2024-04-24 PROCEDURE — 2550000001 HC RX 255 CONTRASTS: Performed by: STUDENT IN AN ORGANIZED HEALTH CARE EDUCATION/TRAINING PROGRAM

## 2024-04-24 PROCEDURE — 2500000004 HC RX 250 GENERAL PHARMACY W/ HCPCS (ALT 636 FOR OP/ED): Performed by: PSYCHIATRY & NEUROLOGY

## 2024-04-24 PROCEDURE — 74177 CT ABD & PELVIS W/CONTRAST: CPT | Performed by: STUDENT IN AN ORGANIZED HEALTH CARE EDUCATION/TRAINING PROGRAM

## 2024-04-24 RX ORDER — HEPARIN SODIUM,PORCINE/PF 10 UNIT/ML
50 SYRINGE (ML) INTRAVENOUS AS NEEDED
Status: DISCONTINUED | OUTPATIENT
Start: 2024-04-24 | End: 2024-04-24 | Stop reason: HOSPADM

## 2024-04-24 RX ORDER — HEPARIN 100 UNIT/ML
500 SYRINGE INTRAVENOUS AS NEEDED
Status: CANCELLED | OUTPATIENT
Start: 2024-04-24

## 2024-04-24 RX ORDER — HEPARIN 100 UNIT/ML
500 SYRINGE INTRAVENOUS AS NEEDED
Status: DISCONTINUED | OUTPATIENT
Start: 2024-04-24 | End: 2024-04-24 | Stop reason: HOSPADM

## 2024-04-24 RX ORDER — HEPARIN SODIUM,PORCINE/PF 10 UNIT/ML
50 SYRINGE (ML) INTRAVENOUS AS NEEDED
Status: CANCELLED | OUTPATIENT
Start: 2024-04-24

## 2024-04-24 RX ADMIN — HEPARIN 500 UNITS: 100 SYRINGE at 11:25

## 2024-04-24 RX ADMIN — IOHEXOL 75 ML: 350 INJECTION, SOLUTION INTRAVENOUS at 10:35

## 2024-04-24 ASSESSMENT — PAIN SCALES - GENERAL: PAINLEVEL: 0-NO PAIN

## 2024-04-30 ENCOUNTER — OFFICE VISIT (OUTPATIENT)
Dept: PRIMARY CARE | Facility: CLINIC | Age: 79
End: 2024-04-30
Payer: MEDICARE

## 2024-04-30 VITALS
DIASTOLIC BLOOD PRESSURE: 70 MMHG | OXYGEN SATURATION: 98 % | SYSTOLIC BLOOD PRESSURE: 110 MMHG | TEMPERATURE: 97.7 F | BODY MASS INDEX: 23.57 KG/M2 | HEART RATE: 78 BPM | WEIGHT: 159.6 LBS

## 2024-04-30 DIAGNOSIS — F34.1 PERSISTENT DEPRESSIVE DISORDER: ICD-10-CM

## 2024-04-30 DIAGNOSIS — G61.81 CHRONIC INFLAMMATORY DEMYELINATING POLYNEUROPATHY (MULTI): Primary | ICD-10-CM

## 2024-04-30 DIAGNOSIS — G20.C PARKINSONISM, UNSPECIFIED PARKINSONISM TYPE (MULTI): ICD-10-CM

## 2024-04-30 PROCEDURE — 1036F TOBACCO NON-USER: CPT | Performed by: STUDENT IN AN ORGANIZED HEALTH CARE EDUCATION/TRAINING PROGRAM

## 2024-04-30 PROCEDURE — 1159F MED LIST DOCD IN RCRD: CPT | Performed by: STUDENT IN AN ORGANIZED HEALTH CARE EDUCATION/TRAINING PROGRAM

## 2024-04-30 PROCEDURE — 1160F RVW MEDS BY RX/DR IN RCRD: CPT | Performed by: STUDENT IN AN ORGANIZED HEALTH CARE EDUCATION/TRAINING PROGRAM

## 2024-04-30 PROCEDURE — 1157F ADVNC CARE PLAN IN RCRD: CPT | Performed by: STUDENT IN AN ORGANIZED HEALTH CARE EDUCATION/TRAINING PROGRAM

## 2024-04-30 PROCEDURE — 99213 OFFICE O/P EST LOW 20 MIN: CPT | Performed by: STUDENT IN AN ORGANIZED HEALTH CARE EDUCATION/TRAINING PROGRAM

## 2024-04-30 RX ORDER — SERTRALINE HYDROCHLORIDE 25 MG/1
25 TABLET, FILM COATED ORAL DAILY
Qty: 90 TABLET | Refills: 3 | Status: SHIPPED | OUTPATIENT
Start: 2024-04-30

## 2024-04-30 ASSESSMENT — ENCOUNTER SYMPTOMS
HEMATURIA: 0
COUGH: 0
DYSPHORIC MOOD: 0
DIARRHEA: 0
CHILLS: 0
DYSURIA: 0
ABDOMINAL PAIN: 0
FREQUENCY: 0
LOSS OF SENSATION IN FEET: 1
DIZZINESS: 0
FATIGUE: 0
NERVOUS/ANXIOUS: 0
CONSTIPATION: 0
SHORTNESS OF BREATH: 0
OCCASIONAL FEELINGS OF UNSTEADINESS: 1
WHEEZING: 0
HEADACHES: 0
NAUSEA: 0
FEVER: 0
DEPRESSION: 1
PALPITATIONS: 0
NUMBNESS: 0

## 2024-04-30 NOTE — PROGRESS NOTES
"Subjective   Patient ID: Wade Montgomery \"Donato\" is a 78 y.o. male who presents for discuss lift (Vertical lift for garage) and Med Refill.    HPI   Needs an rx for a vertical platform lift. Has CIPD and parkinsons. Needs it in their garage, which is the easiest way to get in and out of the house. He needs his Rolator to get around. He cannot do stairs. They cannot do an ADA compliant ramp in their garage.     They need a refill on sertraline. He is on 25 mg, working well. He has both anxiety and depression. He is not anxious or sad.     They got a letter saying his insurance will no longer cover his IVIG infusions, it was working well for him.     Review of Systems   Constitutional:  Negative for chills, fatigue and fever.   Respiratory:  Negative for cough, shortness of breath and wheezing.    Cardiovascular:  Negative for chest pain, palpitations and leg swelling.   Gastrointestinal:  Negative for abdominal pain, constipation, diarrhea and nausea.   Genitourinary:  Negative for dysuria, frequency, hematuria and urgency.   Neurological:  Negative for dizziness, numbness and headaches.   Psychiatric/Behavioral:  Negative for dysphoric mood. The patient is not nervous/anxious.        Objective   /70 (BP Location: Left arm, Patient Position: Sitting, BP Cuff Size: Adult)   Pulse 78   Temp 36.5 °C (97.7 °F) (Temporal)   Wt 72.4 kg (159 lb 9.6 oz)   SpO2 98%   BMI 23.57 kg/m²     Physical Exam  Constitutional:       Appearance: Normal appearance.   HENT:      Head: Normocephalic and atraumatic.   Eyes:      Extraocular Movements: Extraocular movements intact.      Pupils: Pupils are equal, round, and reactive to light.   Cardiovascular:      Rate and Rhythm: Normal rate and regular rhythm.      Heart sounds: Normal heart sounds. No murmur heard.  Pulmonary:      Effort: Pulmonary effort is normal.      Breath sounds: Normal breath sounds. No wheezing.   Abdominal:      General: Bowel sounds are normal.      " Palpations: Abdomen is soft.      Tenderness: There is no abdominal tenderness. There is no guarding.   Skin:     General: Skin is warm and dry.   Neurological:      General: No focal deficit present.      Mental Status: He is alert and oriented to person, place, and time.   Psychiatric:         Mood and Affect: Mood normal.         Behavior: Behavior normal.       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       Patient understands and agrees with treatment plan    Ileana Bro DO   04/30/24

## 2024-05-02 ENCOUNTER — OFFICE VISIT (OUTPATIENT)
Dept: UROLOGY | Facility: CLINIC | Age: 79
End: 2024-05-02
Payer: MEDICARE

## 2024-05-02 VITALS — WEIGHT: 160 LBS | BODY MASS INDEX: 23.7 KG/M2 | HEIGHT: 69 IN | TEMPERATURE: 97.5 F

## 2024-05-02 DIAGNOSIS — K46.9 HERNIA OF ABDOMINAL CAVITY: Primary | ICD-10-CM

## 2024-05-02 DIAGNOSIS — C61 PROSTATE CANCER (MULTI): ICD-10-CM

## 2024-05-02 DIAGNOSIS — C67.9 MALIGNANT NEOPLASM OF URINARY BLADDER, UNSPECIFIED SITE (MULTI): ICD-10-CM

## 2024-05-02 PROCEDURE — 1160F RVW MEDS BY RX/DR IN RCRD: CPT | Performed by: STUDENT IN AN ORGANIZED HEALTH CARE EDUCATION/TRAINING PROGRAM

## 2024-05-02 PROCEDURE — 1126F AMNT PAIN NOTED NONE PRSNT: CPT | Performed by: STUDENT IN AN ORGANIZED HEALTH CARE EDUCATION/TRAINING PROGRAM

## 2024-05-02 PROCEDURE — G2211 COMPLEX E/M VISIT ADD ON: HCPCS | Performed by: STUDENT IN AN ORGANIZED HEALTH CARE EDUCATION/TRAINING PROGRAM

## 2024-05-02 PROCEDURE — 1159F MED LIST DOCD IN RCRD: CPT | Performed by: STUDENT IN AN ORGANIZED HEALTH CARE EDUCATION/TRAINING PROGRAM

## 2024-05-02 PROCEDURE — 1157F ADVNC CARE PLAN IN RCRD: CPT | Performed by: STUDENT IN AN ORGANIZED HEALTH CARE EDUCATION/TRAINING PROGRAM

## 2024-05-02 PROCEDURE — 99214 OFFICE O/P EST MOD 30 MIN: CPT | Performed by: STUDENT IN AN ORGANIZED HEALTH CARE EDUCATION/TRAINING PROGRAM

## 2024-05-02 ASSESSMENT — PAIN SCALES - GENERAL: PAINLEVEL: 0-NO PAIN

## 2024-05-02 NOTE — PROGRESS NOTES
HPI:  Procedure: RC+IC 12/24/2020  Pathology: CIS, GG3 T3A prostate adenocarcinoma     Proc (2/9/22): left lung lower lobe biopsy (performed by Dr. 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 S/p RC+IC. Had COVID in May 2022. PSA <0.01 (9/20/22). Lung cancer treated with radiation. CT CAP (4/6/23) showed extensive reticulonodular densities scattered throughout both lungs as well as areas of infiltrative density and nodularity as well as consolidative opacity at the left lung base overall stable since 02/13/2023, s/p cystectomy and prostatectomy with ureteral diversion and ileal conduit creation, mild prominence of the proximal renal collecting systems and renal pelvis bilaterally which is overall new or slightly more pronounced compared to prior, although the ureters appear grossly stable in caliber with the right slightly more pronounced compared to the left, there is suggestion of minimal urothelial enhancement just proximal to the ileal conduit with some minimal regional stranding. Has started speech therapy and will start seeing a movement Neurologist. CT CAP (10/20/23) showed No CT evidence of an acute process within the chest, abdomen, or pelvis, stable abnormalities in the lungs, s/p cystectomy with right lower quadrant ileal conduit, no hydronephrosis, stable minimal nonspecific stranding surrounding the distal aspect of the ureters, just proximal to the conduit. Has been diagnosed with Parkinsonism. CT CAP (4/24/24) showed chest, unchanged reticulonodular opacities and findings compatible with scarring/fibrosis in the lung zones, stable pulmonary nodules, no evidence of metastatic disease, s/p cystoprostatectomy with right lower quadrant urinary diversion ileal conduit, no evidence of metastatic disease in the abdomen and pelvis. Patient wears 1 diaper per day due to bowel incontinence.  Reports continued scrotal pain (not perineal)      2/12/21 PSA <0.10, 6/2021:<0.01   Cre: 0.75 (10/23/23), 0.76 (4/4/23), 0.67 (6/23/22), 0.79 (12/17/21)  UA (2/14/22): moderate (2+) blood  Bacterial urine culture (2/14/22): Klebsiella pneumoniae >100,000 CFU/ML  Urine cytology (6/30/22): no malignant cells identified.     CT Chest: slight enlargement of 6mm nodule, AP LAKESHA.      CT CAP w contrast (12/18/2021): Stable 1.8 cm right adrenal nodule. No new subdiaphragmatic metastasis. Enlargement of lung nodule, now 1.4 cm, previously 0.6 cm.     PET CT (1/20/22): A hypermetabolic left lower lobe pulmonary nodule is concerning for a primary malignancy. Metastatic disease is possible but felt to be less likely. No evidence for hypermetabolic metastatic disease within the abdomen and pelvis. Postoperative changes of radical cystoprostatectomy with ileal conduit formation. No evidence for hypermetabolic disease in the surgical bed.     CT Chest without Contrast (6/22/22): In comparison to previous examination there has been interval decrease in size of left lower lobe pulmonary nodule now measuring 9 mm x 6 mm. Interval development of an indeterminate cavitary lesion/nodule within the right upper lobe measuring 8 mm x 6 mm. Interval T8 and T10 osteoporotic appearing compression fractures, likely acute versus subacute.     CT AP w IV Contrast (9/22/22): Interval increase in size of enlarged left paratracheal lymph nodes, concerning for new/worsening metastatic lymphadenopathy. Benign/reactive lymphadenopathy is a differential consideration. Interval morphologic change of a previously cavitary and now entirely solid 0.8 cm right upper lobe nodule without significant change in size. A new 0.9 cm cavitary lesion is seen in the adjacent lung. These findings are indeterminate and differential considerations include metastatic disease, a 2nd lung primary with new satellite lesion, or a localized infectious/inflammatory process.  Stable mild compression deformities of the T8 and T10 vertebral bodies. No definite evidence of malignancy or metastatic disease in the abdomen or pelvis. Stable postsurgical changes from prostatectomy and cystectomy with ileal conduit creation. No evident complication.     CT CAP (4/6/23): extensive reticulonodular densities scattered throughout both lungs as well as areas of infiltrative density and nodularity as well as consolidative opacity at the left lung base overall stable since 02/13/2023, s/p cystectomy and prostatectomy with ureteral diversion and ileal conduit creation, mild prominence of the proximal renal collecting systems and renal pelvis bilaterally which is overall new or slightly more pronounced compared to prior, although the ureters appear grossly stable in caliber with the right slightly more pronounced compared to the left, there is suggestion of minimal urothelial enhancement just proximal to the ileal conduit with some minimal regional stranding.     CT CAP (10/20/23): No CT evidence of an acute process within the chest, abdomen, or pelvis, stable abnormalities in the lungs, s/p cystectomy with right lower quadrant ileal conduit, no hydronephrosis, stable minimal nonspecific stranding surrounding the distal aspect of the ureters, just proximal to the conduit.    CT CAP (4/24/24): chest, unchanged reticulonodular opacities and findings compatible with scarring/fibrosis in the lung zones, stable pulmonary nodules, no evidence of metastatic disease, s/p cystoprostatectomy with right lower quadrant urinary diversion ileal conduit, no evidence of metastatic disease in the abdomen and pelvis.    Review of Systems:  All systems reviewed. Anything negative noted in the HPI.    Physical Exam:  Vitals signs reviewed.  Constitutional:      Appearance: Well-developed.  HENT:     Head: Normocephalic and atraumatic.  Neck:     Musculoskeletal: Normal range of motion.  Pulmonary:     Effort: Pulmonary  effort is normal.  Musculoskeletal: Normal range of motion.  Skin:     General: Skin is warm and dry.  Neurological:     Mental Status: Alert and oriented to person, place, and time.  Psychiatric:        Behavior: Behavior normal.        Thought Content: Thought content normal.        Judgment: Judgment normal.  Genitourinary:     Penis: Normal.      Scrotum/Testes: Normal.     Comments: no scrotal lesions  Abdominal:     Palpations: Abdomen is soft. There is no mass.     Tenderness: There is no tenderness. There is no guarding or rebound.     Hernia: No hernia is present.     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 S/p RC+IC. Had COVID in May 2022. PSA <0.01 (9/20/22). Lung cancer treated with radiation. CT CAP (10/20/23) showed No CT evidence of an acute process within the chest, abdomen, or pelvis, stable abnormalities in the lungs, s/p cystectomy with right lower quadrant ileal conduit, no hydronephrosis, stable minimal nonspecific stranding surrounding the distal aspect of the ureters, just proximal to the conduit. Reports irritation, burning, and rash on penile tip, underside, and scrotum. CT CAP (4/24/24) showed chest, unchanged reticulonodular opacities and findings compatible with scarring/fibrosis in the lung zones, stable pulmonary nodules, no evidence of metastatic disease, s/p cystoprostatectomy with right lower quadrant urinary diversion ileal conduit, no evidence of metastatic disease in the abdomen and pelvis. Reports continued scrotal pain (not perineal). Management options including risks, benefits and alternatives discussed at length and all questions answered. Patient prefers to proceed with PSA now and follow-up in 1 year with CT CAP.           Scribe Attestation  By signing my name below, ICarmen Scribe   attest that this documentation has been prepared under the direction and in the presence of Jeremiah Gomez MD.

## 2024-05-03 ENCOUNTER — APPOINTMENT (OUTPATIENT)
Dept: INFUSION THERAPY | Facility: HOSPITAL | Age: 79
End: 2024-05-03
Payer: MEDICARE

## 2024-05-06 ENCOUNTER — LAB (OUTPATIENT)
Dept: LAB | Facility: LAB | Age: 79
End: 2024-05-06
Payer: MEDICARE

## 2024-05-06 DIAGNOSIS — C67.9 MALIGNANT NEOPLASM OF URINARY BLADDER, UNSPECIFIED SITE (MULTI): ICD-10-CM

## 2024-05-06 DIAGNOSIS — C61 PROSTATE CANCER (MULTI): ICD-10-CM

## 2024-05-06 LAB — PSA SERPL-MCNC: <0.01 NG/ML

## 2024-05-06 PROCEDURE — 84153 ASSAY OF PSA TOTAL: CPT

## 2024-05-10 ENCOUNTER — APPOINTMENT (OUTPATIENT)
Dept: INFUSION THERAPY | Facility: HOSPITAL | Age: 79
End: 2024-05-10
Payer: MEDICARE

## 2024-05-10 ENCOUNTER — INFUSION (OUTPATIENT)
Dept: INFUSION THERAPY | Facility: HOSPITAL | Age: 79
End: 2024-05-10
Payer: MEDICARE

## 2024-05-10 VITALS
HEART RATE: 78 BPM | DIASTOLIC BLOOD PRESSURE: 75 MMHG | TEMPERATURE: 98.3 F | BODY MASS INDEX: 24.42 KG/M2 | SYSTOLIC BLOOD PRESSURE: 137 MMHG | WEIGHT: 165.34 LBS | OXYGEN SATURATION: 97 % | RESPIRATION RATE: 16 BRPM

## 2024-05-10 DIAGNOSIS — G61.81 CHRONIC INFLAMMATORY DEMYELINATING POLYNEURITIS (MULTI): ICD-10-CM

## 2024-05-10 LAB
ALBUMIN SERPL BCP-MCNC: 3.8 G/DL (ref 3.4–5)
ALP SERPL-CCNC: 61 U/L (ref 33–136)
ALT SERPL W P-5'-P-CCNC: 4 U/L (ref 10–52)
ANION GAP SERPL CALC-SCNC: 9 MMOL/L (ref 10–20)
AST SERPL W P-5'-P-CCNC: 19 U/L (ref 9–39)
BASOPHILS # BLD AUTO: 0.01 X10*3/UL (ref 0–0.1)
BASOPHILS NFR BLD AUTO: 0.2 %
BILIRUB SERPL-MCNC: 0.5 MG/DL (ref 0–1.2)
BUN SERPL-MCNC: 17 MG/DL (ref 6–23)
CALCIUM SERPL-MCNC: 8.9 MG/DL (ref 8.6–10.3)
CHLORIDE SERPL-SCNC: 103 MMOL/L (ref 98–107)
CO2 SERPL-SCNC: 29 MMOL/L (ref 21–32)
CREAT SERPL-MCNC: 0.7 MG/DL (ref 0.5–1.3)
EGFRCR SERPLBLD CKD-EPI 2021: >90 ML/MIN/1.73M*2
EOSINOPHIL # BLD AUTO: 0.03 X10*3/UL (ref 0–0.4)
EOSINOPHIL NFR BLD AUTO: 0.6 %
ERYTHROCYTE [DISTWIDTH] IN BLOOD BY AUTOMATED COUNT: 12.7 % (ref 11.5–14.5)
GLUCOSE SERPL-MCNC: 80 MG/DL (ref 74–99)
HCT VFR BLD AUTO: 38.5 % (ref 41–52)
HGB BLD-MCNC: 13 G/DL (ref 13.5–17.5)
IMM GRANULOCYTES # BLD AUTO: 0.01 X10*3/UL (ref 0–0.5)
IMM GRANULOCYTES NFR BLD AUTO: 0.2 % (ref 0–0.9)
LYMPHOCYTES # BLD AUTO: 1.55 X10*3/UL (ref 0.8–3)
LYMPHOCYTES NFR BLD AUTO: 29.6 %
MCH RBC QN AUTO: 31.9 PG (ref 26–34)
MCHC RBC AUTO-ENTMCNC: 33.8 G/DL (ref 32–36)
MCV RBC AUTO: 95 FL (ref 80–100)
MONOCYTES # BLD AUTO: 0.58 X10*3/UL (ref 0.05–0.8)
MONOCYTES NFR BLD AUTO: 11.1 %
NEUTROPHILS # BLD AUTO: 3.05 X10*3/UL (ref 1.6–5.5)
NEUTROPHILS NFR BLD AUTO: 58.3 %
NRBC BLD-RTO: 0 /100 WBCS (ref 0–0)
PLATELET # BLD AUTO: 156 X10*3/UL (ref 150–450)
POTASSIUM SERPL-SCNC: 4.2 MMOL/L (ref 3.5–5.3)
PROT SERPL-MCNC: 7.1 G/DL (ref 6.4–8.2)
RBC # BLD AUTO: 4.07 X10*6/UL (ref 4.5–5.9)
SODIUM SERPL-SCNC: 137 MMOL/L (ref 136–145)
WBC # BLD AUTO: 5.2 X10*3/UL (ref 4.4–11.3)

## 2024-05-10 PROCEDURE — 96365 THER/PROPH/DIAG IV INF INIT: CPT | Mod: INF

## 2024-05-10 PROCEDURE — 85025 COMPLETE CBC W/AUTO DIFF WBC: CPT

## 2024-05-10 PROCEDURE — 2500000004 HC RX 250 GENERAL PHARMACY W/ HCPCS (ALT 636 FOR OP/ED): Mod: JZ | Performed by: PSYCHIATRY & NEUROLOGY

## 2024-05-10 PROCEDURE — 96366 THER/PROPH/DIAG IV INF ADDON: CPT | Mod: INF

## 2024-05-10 PROCEDURE — 80053 COMPREHEN METABOLIC PANEL: CPT

## 2024-05-10 PROCEDURE — 2500000001 HC RX 250 WO HCPCS SELF ADMINISTERED DRUGS (ALT 637 FOR MEDICARE OP): Performed by: PSYCHIATRY & NEUROLOGY

## 2024-05-10 RX ORDER — DIPHENHYDRAMINE HCL 25 MG
25 CAPSULE ORAL ONCE
Status: COMPLETED | OUTPATIENT
Start: 2024-05-10 | End: 2024-05-10

## 2024-05-10 RX ORDER — HEPARIN 100 UNIT/ML
500 SYRINGE INTRAVENOUS AS NEEDED
Status: CANCELLED | OUTPATIENT
Start: 2024-05-10

## 2024-05-10 RX ORDER — DIPHENHYDRAMINE HCL 25 MG
25 CAPSULE ORAL ONCE
Status: CANCELLED | OUTPATIENT
Start: 2024-05-31 | End: 2024-05-31

## 2024-05-10 RX ORDER — ACETAMINOPHEN 325 MG/1
650 TABLET ORAL ONCE
Status: COMPLETED | OUTPATIENT
Start: 2024-05-10 | End: 2024-05-10

## 2024-05-10 RX ORDER — ACETAMINOPHEN 325 MG/1
650 TABLET ORAL ONCE
Status: CANCELLED | OUTPATIENT
Start: 2024-05-31 | End: 2024-05-31

## 2024-05-10 RX ORDER — HEPARIN 100 UNIT/ML
500 SYRINGE INTRAVENOUS AS NEEDED
Status: DISCONTINUED | OUTPATIENT
Start: 2024-05-10 | End: 2024-05-10 | Stop reason: HOSPADM

## 2024-05-10 RX ORDER — HEPARIN SODIUM,PORCINE/PF 10 UNIT/ML
50 SYRINGE (ML) INTRAVENOUS AS NEEDED
Status: CANCELLED | OUTPATIENT
Start: 2024-05-10

## 2024-05-10 RX ADMIN — ACETAMINOPHEN 650 MG: 325 TABLET ORAL at 08:26

## 2024-05-10 RX ADMIN — IMMUNE GLOBULIN (HUMAN) 70 G: 10 INJECTION INTRAVENOUS; SUBCUTANEOUS at 09:07

## 2024-05-10 RX ADMIN — DIPHENHYDRAMINE HYDROCHLORIDE 25 MG: 25 CAPSULE ORAL at 08:26

## 2024-05-10 RX ADMIN — Medication 500 UNITS: at 13:05

## 2024-05-10 ASSESSMENT — ENCOUNTER SYMPTOMS
DEPRESSION: 0
LOSS OF SENSATION IN FEET: 0
OCCASIONAL FEELINGS OF UNSTEADINESS: 1

## 2024-05-13 ENCOUNTER — TELEPHONE (OUTPATIENT)
Dept: RADIATION ONCOLOGY | Facility: HOSPITAL | Age: 79
End: 2024-05-13
Payer: MEDICARE

## 2024-05-14 ENCOUNTER — HOSPITAL ENCOUNTER (OUTPATIENT)
Dept: RADIATION ONCOLOGY | Facility: HOSPITAL | Age: 79
Setting detail: RADIATION/ONCOLOGY SERIES
Discharge: HOME | End: 2024-05-14
Payer: MEDICARE

## 2024-05-14 DIAGNOSIS — C34.32 MALIGNANT NEOPLASM OF LOWER LOBE OF LEFT LUNG (MULTI): Primary | ICD-10-CM

## 2024-05-14 DIAGNOSIS — R29.898 WEAKNESS OF BOTH HANDS: ICD-10-CM

## 2024-05-14 PROCEDURE — 99214 OFFICE O/P EST MOD 30 MIN: CPT | Performed by: STUDENT IN AN ORGANIZED HEALTH CARE EDUCATION/TRAINING PROGRAM

## 2024-05-14 ASSESSMENT — ENCOUNTER SYMPTOMS
LOSS OF SENSATION IN FEET: 1
CONFUSION: 1
ENDOCRINE NEGATIVE: 1
DEPRESSION: 0
SLEEP DISTURBANCE: 1
NERVOUS/ANXIOUS: 1
EYES NEGATIVE: 1
OCCASIONAL FEELINGS OF UNSTEADINESS: 1
CONSTIPATION: 1
DECREASED CONCENTRATION: 1
RESPIRATORY NEGATIVE: 1
FATIGUE: 1
HEMATOLOGIC/LYMPHATIC NEGATIVE: 1
APPETITE CHANGE: 1
NUMBNESS: 1
DIARRHEA: 1

## 2024-05-14 NOTE — PROGRESS NOTES
Radiation Oncology Nursing Note    Pain: The patient's current pain level was assessed.  They report currently having a pain of 0 out of 10.  They feel their pain is under control without the use of pain medications.    Review of Systems:  Review of Systems   Constitutional:  Positive for appetite change and fatigue.   HENT:  Negative.     Eyes: Negative.    Respiratory: Negative.     Cardiovascular:         Sore chest a couple days ago, better after a few minutes     Gastrointestinal:  Positive for constipation and diarrhea.   Endocrine: Negative.    Genitourinary:          Stoma       Musculoskeletal:  Positive for gait problem.        Left knee arthritis   Skin: Negative.    Neurological:  Positive for gait problem and numbness.   Hematological: Negative.    Psychiatric/Behavioral:  Positive for confusion, decreased concentration and sleep disturbance. The patient is nervous/anxious.

## 2024-05-14 NOTE — PROGRESS NOTES
Staff Physician: Giovanni Fitzgerald MD, MS  Resident Physician: Mango Collier MD PGY-3  Referring Physician: No ref. provider found  Date of Service: 5/14/2024  RADIATION ONCOLOGY FOLLOW UP NOTE  IDENTIFYING DATA:  Cancer Staging   No matching staging information was found for the patient.    Problem List Items Addressed This Visit    None      78 year old male with a history of bladder cancer and prostate cancer s/p prostacystectomy with ureteral diversion and ileal conduit creation, who was found to have a newly diagnosed Stage I NSCLC of the LLL s/p SBRT 55Gy/5fx on 3/21/22. The patient is here as a 2 year follow-up post radiation treatment to the lungs.     INTERVAL HISTORY  Since we last saw Wade Montgomery in clinic on 11/2023, he has felt well. His weight is stable from last visit and his appetite is unchanged. He denies any significant symptoms at this time. Specifically, he denies shortness of breath, chest wall tenderness, chest pain, hemoptysis, fevers, cough, new oxygenation requirements.  He has had no new medical problems or medications since our last visit.    His last imaging, comprising CT CAP on 4/24/24, demonstrates stable volume loss in the left lung, stable lung nodules in the bilateral lungs, overall unchanged and likely representing inflammatory/infectious processes. No evidence of metastatic disease noted elsewhere.     PAST MEDICAL, SURGICAL, FAMILY, AND SOCIAL HISTORY:  Past Medical History:   Diagnosis Date    Acute pain in scrotum 03/18/2024    Bicipital tendinitis, left shoulder 02/27/2014    Biceps tendonitis on left    Contact with and (suspected) exposure to unspecified communicable disease 10/24/2019    Exposure to communicable disease    Dry eye syndrome of bilateral lacrimal glands     Dry eye syndrome of both eyes    Encounter for general adult medical examination without abnormal findings 08/19/2019    Initial Medicare annual wellness visit    Excessive tear production 03/18/2024     Finger injury 03/18/2024    History of subdural hematoma 03/18/2024    Nasal congestion 03/18/2024    Other chest pain 07/14/2015    Chest pain, muscular    Pain in left hip 04/20/2022    Left hip pain    Pain in left knee 04/20/2022    Left knee pain    Pain in scrotum 03/18/2024    Pain of right lower extremity 03/18/2024    Pelvic and perineal pain 08/19/2019    Suprapubic pressure    Personal history of other diseases of the circulatory system     History of subdural hematoma    Personal history of other diseases of the digestive system 05/10/2018    History of rectal bleeding    Personal history of other diseases of the musculoskeletal system and connective tissue 02/09/2022    History of low back pain    Personal history of other diseases of the respiratory system 02/27/2014    History of acute bronchitis    Personal history of other diseases of urinary system     History of hematuria    Personal history of other drug therapy     History of influenza vaccination    Personal history of other infectious and parasitic diseases 09/09/2019    History of tinea cruris    Personal history of other infectious and parasitic diseases 03/30/2017    History of tinea cruris    Personal history of other specified conditions 09/09/2020    History of flank pain    Personal history of other specified conditions 03/03/2020    History of nasal congestion    Personal history of other specified conditions 02/14/2022    History of dysuria    Personal history of other specified conditions 12/10/2019    History of epigastric pain    Personal history of urinary calculi 07/24/2019    History of urinary stone    Rash and other nonspecific skin eruption 01/06/2017    Rash    Retention of urine, unspecified 11/25/2020    Urinary retention with incomplete bladder emptying    Shoulder pain 03/18/2024    Strain of muscle, fascia and tendon of lower back, initial encounter 07/14/2015    Lumbar strain    Tear film insufficiency 03/18/2024     Tinea cruris 03/18/2024    Trochanteric bursitis, left hip 09/22/2021    Greater trochanteric bursitis of left hip     Past Surgical History:   Procedure Laterality Date    CT GUIDED PERCUTANEOUS BIOPSY LUNG  2/9/2022    CT GUIDED PERCUTANEOUS BIOPSY LUNG 2/9/2022 [facility]    OTHER SURGICAL HISTORY  02/13/2020    Hernia repair    OTHER SURGICAL HISTORY  02/13/2020    Knee surgery    OTHER SURGICAL HISTORY  02/13/2020    Trigger finger repair    OTHER SURGICAL HISTORY  01/13/2020    Craniotomy    OTHER SURGICAL HISTORY  01/13/2020    Carpal tunnel surgery    OTHER SURGICAL HISTORY  10/27/2020    Sinus surgery    OTHER SURGICAL HISTORY  02/16/2022    Cataract surgery    OTHER SURGICAL HISTORY  01/26/2021    Nerve biospy     ALLERGIES:  Allergies   Allergen Reactions    Oxycodone Other and Hallucinations     Psychosis    Penicillin V Other     MEDICATIONS:    Current Outpatient Medications:     acetaminophen (TYLENOL ORAL), if needed., Disp: , Rfl:     aspirin 81 mg EC tablet, Take 1 tablet (81 mg) by mouth once daily., Disp: , Rfl:     carbidopa-levodopa (Sinemet)  mg tablet, Take 1.5 tabs TID, Disp: 405 tablet, Rfl: 3    carboxymethylcellulose (Refresh Plus) 0.5 % ophthalmic solution, Administer into affected eye(s) 4 times a day., Disp: , Rfl:     diclofenac sodium (Voltaren) 1 % gel gel, Apply 4.5 inches (4 g) topically 4 times a day.  APPLY TO LOWER EXTREMITIES. DO NOT APPLY MORE THAN 16 GM DAILY TO ANY ONE AFFECTED JOINT., Disp: , Rfl:     diphenhydramine HCl (BENADRYL ORAL), if needed., Disp: , Rfl:     docusate sodium (Colace) 100 mg capsule, Take 2 capsules (200 mg) by mouth once daily., Disp: , Rfl:     fluticasone (Flonase) 50 mcg/actuation nasal spray, Administer 2 sprays into each nostril if needed., Disp: , Rfl:     ibuprofen 600 mg tablet, Take 1 tablet (600 mg) by mouth every 6 hours if needed. States takes 200 mg, Disp: , Rfl:     immune globul G-gly-IgA avg 46 (Gamunex-C) 20 gram/200 mL  (10 %) solution, Infuse 70 gm divided over 1 days every 3 weeks x 6 months. Premedicate with 650 mg acetaminophen PO, 25 mg diphenhydramine PO, and 100 mg hydrocortisone IV push., Disp: , Rfl:     ketoconazole (NIZOral) 2 % cream, Apply twice daily to affected areas of face, Disp: 60 g, Rfl: 11    ketoconazole (NIZOral) 2 % shampoo, Ketoconazole 2 % External Shampoo Quantity: 120  Refills: 0  Start: 15-Sep-2021, Disp: , Rfl:     melatonin 5 mg tablet, Take by mouth., Disp: , Rfl:     multivitamin tablet, Take 1 tablet by mouth once daily. Take with food, Disp: , Rfl:     polyethylene glycol (Miralax) 17 gram/dose powder, Take by mouth., Disp: , Rfl:     predniSONE (Deltasone) 5 mg tablet, Take 1 tablet (5 mg) by mouth once daily., Disp: 90 tablet, Rfl: 3    Restasis 0.05 % ophthalmic emulsion, Administer 1 drop into both eyes every 12 hours., Disp: 180 each, Rfl: 3    rosuvastatin (Crestor) 5 mg tablet, Take 1 tablet (5 mg) by mouth once daily., Disp: 90 tablet, Rfl: 0    sertraline (Zoloft) 25 mg tablet, Take 1 tablet (25 mg) by mouth once daily., Disp: 90 tablet, Rfl: 3     REVIEW OF SYSTEMS:  Except for the symptoms described in the interval history, the review of systems is negative. Specifically, except as noted, when asked the patient expressed no complaints relative to constitutional (fever, weight loss), eyes, ears, nose, mouth, throat, neurologic, cardiovascular, pulmonary, breast, GI, , skin, musculoskeletal, endocrine, hematologic/lymphatic, or immunologic systems.    PERFORMANCE STATUS:  Karnofsky Performance Score/ECO, Requires occasional assistance, but is able to care for most of his personal needs (ECOG equivalent 2)    PHYSICAL EXAMINATION:  There were no vitals taken for this visit.  Pain score: 0/10  Due to the nature of this virtual visit, a limited physical examination was completed    DIAGNOSTIC REPORTS REVIEWED:  Imaging: All imaging was personally reviewed and interpreted in clinic.  Findings as per interval history and EMR.  Laboratory/Pathology:  All pertinent labs and pathology were personally reviewed and interpreted in clinic. Findings as per interval history and EMR.     IMPRESSION:  78 year old male with a history of bladder cancer and prostate cancer s/p prostacystectomy with ureteral diversion and ileal conduit creation, who was found to have a newly diagnosed Stage I NSCLC of the LLL s/p SBRT 55Gy/5fx on 3/21/22. The patient is here as a 2 year follow-up post radiation treatment to the lungs.     PLAN: I have asked Wade Montgomery to please return to clinic in 6 month's time with repeat CT chest, in which he will see MEHDI Torres. The patient knows to call us in the interim for any further questions/concerns.     PAIN PLAN: The patient reports their pain is well-controlled on their current regimen.  Their pain regimen is currently managed by Primary Care.      DISEASE STATUS: Controlled  NEW METACHRONOUS CANCER: No    Thank you for the opportunity to participate in the ongoing care of this pleasant man.    The patient was evaluated by and discussed with attending physician, Dr. Fitzgerald, who repeated all key aspects of the HPI and physical exam.    Mango Collier MD  PGY-3 Radiation Oncology

## 2024-05-16 ENCOUNTER — APPOINTMENT (OUTPATIENT)
Dept: UROLOGY | Facility: CLINIC | Age: 79
End: 2024-05-16
Payer: MEDICARE

## 2024-05-24 ENCOUNTER — APPOINTMENT (OUTPATIENT)
Dept: INFUSION THERAPY | Facility: HOSPITAL | Age: 79
End: 2024-05-24
Payer: MEDICARE

## 2024-05-30 ENCOUNTER — OFFICE VISIT (OUTPATIENT)
Dept: PRIMARY CARE | Facility: CLINIC | Age: 79
End: 2024-05-30
Payer: MEDICARE

## 2024-05-30 VITALS
WEIGHT: 162.8 LBS | HEIGHT: 69 IN | DIASTOLIC BLOOD PRESSURE: 82 MMHG | SYSTOLIC BLOOD PRESSURE: 128 MMHG | OXYGEN SATURATION: 99 % | BODY MASS INDEX: 24.11 KG/M2 | TEMPERATURE: 97.6 F | HEART RATE: 89 BPM

## 2024-05-30 DIAGNOSIS — D89.89 KAPPA LIGHT CHAIN DISEASE (MULTI): ICD-10-CM

## 2024-05-30 DIAGNOSIS — Z00.00 MEDICARE ANNUAL WELLNESS VISIT, SUBSEQUENT: Primary | ICD-10-CM

## 2024-05-30 DIAGNOSIS — Z00.00 HEALTH MAINTENANCE EXAMINATION: ICD-10-CM

## 2024-05-30 DIAGNOSIS — Z13.31 DEPRESSION SCREENING: ICD-10-CM

## 2024-05-30 DIAGNOSIS — C78.02 SECONDARY MALIGNANT NEOPLASM OF LEFT LUNG (MULTI): ICD-10-CM

## 2024-05-30 DIAGNOSIS — Z23 ENCOUNTER FOR IMMUNIZATION: ICD-10-CM

## 2024-05-30 DIAGNOSIS — I25.10 CORONARY ARTERY DISEASE INVOLVING NATIVE CORONARY ARTERY OF NATIVE HEART WITHOUT ANGINA PECTORIS: ICD-10-CM

## 2024-05-30 DIAGNOSIS — F34.1 PERSISTENT DEPRESSIVE DISORDER: ICD-10-CM

## 2024-05-30 DIAGNOSIS — E78.2 MIXED HYPERLIPIDEMIA: ICD-10-CM

## 2024-05-30 PROCEDURE — 99397 PER PM REEVAL EST PAT 65+ YR: CPT | Performed by: STUDENT IN AN ORGANIZED HEALTH CARE EDUCATION/TRAINING PROGRAM

## 2024-05-30 PROCEDURE — G0009 ADMIN PNEUMOCOCCAL VACCINE: HCPCS | Performed by: STUDENT IN AN ORGANIZED HEALTH CARE EDUCATION/TRAINING PROGRAM

## 2024-05-30 PROCEDURE — 1170F FXNL STATUS ASSESSED: CPT | Performed by: STUDENT IN AN ORGANIZED HEALTH CARE EDUCATION/TRAINING PROGRAM

## 2024-05-30 PROCEDURE — 1158F ADVNC CARE PLAN TLK DOCD: CPT | Performed by: STUDENT IN AN ORGANIZED HEALTH CARE EDUCATION/TRAINING PROGRAM

## 2024-05-30 PROCEDURE — 1160F RVW MEDS BY RX/DR IN RCRD: CPT | Performed by: STUDENT IN AN ORGANIZED HEALTH CARE EDUCATION/TRAINING PROGRAM

## 2024-05-30 PROCEDURE — 1123F ACP DISCUSS/DSCN MKR DOCD: CPT | Performed by: STUDENT IN AN ORGANIZED HEALTH CARE EDUCATION/TRAINING PROGRAM

## 2024-05-30 PROCEDURE — G0444 DEPRESSION SCREEN ANNUAL: HCPCS | Performed by: STUDENT IN AN ORGANIZED HEALTH CARE EDUCATION/TRAINING PROGRAM

## 2024-05-30 PROCEDURE — 90677 PCV20 VACCINE IM: CPT | Performed by: STUDENT IN AN ORGANIZED HEALTH CARE EDUCATION/TRAINING PROGRAM

## 2024-05-30 PROCEDURE — G0439 PPPS, SUBSEQ VISIT: HCPCS | Performed by: STUDENT IN AN ORGANIZED HEALTH CARE EDUCATION/TRAINING PROGRAM

## 2024-05-30 PROCEDURE — 1157F ADVNC CARE PLAN IN RCRD: CPT | Performed by: STUDENT IN AN ORGANIZED HEALTH CARE EDUCATION/TRAINING PROGRAM

## 2024-05-30 PROCEDURE — 1036F TOBACCO NON-USER: CPT | Performed by: STUDENT IN AN ORGANIZED HEALTH CARE EDUCATION/TRAINING PROGRAM

## 2024-05-30 PROCEDURE — 1159F MED LIST DOCD IN RCRD: CPT | Performed by: STUDENT IN AN ORGANIZED HEALTH CARE EDUCATION/TRAINING PROGRAM

## 2024-05-30 PROCEDURE — 99213 OFFICE O/P EST LOW 20 MIN: CPT | Performed by: STUDENT IN AN ORGANIZED HEALTH CARE EDUCATION/TRAINING PROGRAM

## 2024-05-30 RX ORDER — ROSUVASTATIN CALCIUM 5 MG/1
5 TABLET, COATED ORAL DAILY
Qty: 90 TABLET | Refills: 3 | Status: SHIPPED | OUTPATIENT
Start: 2024-05-30

## 2024-05-30 ASSESSMENT — ACTIVITIES OF DAILY LIVING (ADL)
DOING_HOUSEWORK: TOTAL CARE
MANAGING_FINANCES: TOTAL CARE
TAKING_MEDICATION: TOTAL CARE
GROCERY_SHOPPING: TOTAL CARE
DRESSING: NEEDS ASSISTANCE
BATHING: NEEDS ASSISTANCE

## 2024-05-30 ASSESSMENT — ENCOUNTER SYMPTOMS
NERVOUS/ANXIOUS: 0
HEMATURIA: 0
CONSTIPATION: 0
WHEEZING: 0
ABDOMINAL PAIN: 0
DYSURIA: 0
DIARRHEA: 0
FREQUENCY: 0
HEADACHES: 0
DYSPHORIC MOOD: 0
DEPRESSION: 0
NUMBNESS: 0
LOSS OF SENSATION IN FEET: 1
CHILLS: 0
COUGH: 0
OCCASIONAL FEELINGS OF UNSTEADINESS: 1
SHORTNESS OF BREATH: 0
FEVER: 0
FATIGUE: 0
NAUSEA: 0
DIZZINESS: 0
PALPITATIONS: 0

## 2024-05-30 NOTE — PROGRESS NOTES
Subjective   Reason for Visit: Wade Montgomery is an 79 y.o. male here for a Medicare Wellness visit.     Past Medical, Surgical, and Family History reviewed and updated in chart.    Reviewed all medications by prescribing practitioner or clinical pharmacist (such as prescriptions, OTCs, herbal therapies and supplements) and documented in the medical record.    HPI  Specialists seen by patient: Urology: Dr. Gomez, prostate cancer  - Neurology: Parkinson's and CIDP  - Ortho: Chronic knee pain  - Dermatology  - Oncology  -ophthalmology  -radiation oncology    Hx of colon ca screening: N/A  Immunizations: not up to date -Prevnar 20 given today, will get Tdap at pharmacy  Diet: Follows a healthy diet  Exercise: infrequently, 5-10 minutes every other day of some leg exercises  Alcohol abuse screen:   1 per month   How many times in the past year 5 or more drinks in a day? 0  Lung cancer screening:   Smoking history: never a smoker  AAA US: not applicable  Drug use: No  Depression screen yearly (phq-2): 0  Living will/healthcare power of : Yes - reviewed    Patient Self Assessment of Health Status  Patient Self Assessment: Fair  Functional Ability/Level of Safety  Cognitive Impairment Observed: Cognitive impairment observed  Cognitive Impairment Reported: Cognitive impairment reported by patient or family  [place]alls Home Safety Risk Factors  Home Safety Risk Factors: None  Nutrition and Exercise  Current Diet: Well Balanced Diet  Adequate Fluid Intake: Yes  Caffeine: Yes  Exercise Frequency: Infrequently  ADL Screening  Hearing - Right Ear: Functional  Hearing - Left Ear: Functional  Bathing: Needs assistance  Dressing: Needs assistance  Walks in Home: Needs assistance  IADL's  Managing Finances: Total care  Grocery Shopping: Total care  Taking Medication: Total care  Doing Housework: Total care      Feels zoloft is marginal but does help. Helps a little bit. Does feel frustrated a lot.     Patient Care Team:  Ileana  "MARBELLA Bro DO as PCP - General (Family Medicine)  Ileana Bro DO as PCP - [employer]age PCP  Mynor Giraldo MD as Consulting Physician (Hematology and Oncology)     Review of Systems   Constitutional:  Negative for chills, fatigue and fever.   Respiratory:  Negative for cough, shortness of breath and wheezing.    Cardiovascular:  Negative for chest pain, palpitations and leg swelling.   Gastrointestinal:  Negative for abdominal pain, constipation, diarrhea and nausea.   Genitourinary:  Negative for dysuria, frequency, hematuria and urgency.   Neurological:  Negative for dizziness, numbness and headaches.   Psychiatric/Behavioral:  Negative for dysphoric mood. The patient is not nervous/anxious.        Objective   Vitals:  /82 (BP Location: Left arm, Patient Position: Sitting, BP Cuff Size: Adult)   Pulse 89   Temp 36.4 °C (97.6 °F) (Temporal)   Ht 1.74 m (5' 8.5\")   Wt 73.8 kg (162 lb 12.8 oz)   SpO2 99%   BMI 24.39 kg/m²       Physical Exam  Constitutional:       General: He is not in acute distress.     Appearance: Normal appearance.   HENT:      Head: Normocephalic and atraumatic.      Right Ear: Tympanic membrane and ear canal normal.      Left Ear: Tympanic membrane and ear canal normal.      Mouth/Throat:      Mouth: Mucous membranes are moist.      Pharynx: No posterior oropharyngeal erythema.   Eyes:      Extraocular Movements: Extraocular movements intact.      Pupils: Pupils are equal, round, and reactive to light.   Cardiovascular:      Rate and Rhythm: Normal rate and regular rhythm.      Heart sounds: No murmur heard.  Pulmonary:      Effort: Pulmonary effort is normal. No respiratory distress.      Breath sounds: Normal breath sounds. No wheezing.   Abdominal:      General: Bowel sounds are normal.      Palpations: Abdomen is soft.      Tenderness: There is no abdominal tenderness. There is no guarding.   Musculoskeletal:         General: Normal range of motion.      " Cervical back: Neck supple.   Skin:     General: Skin is warm and dry.   Neurological:      General: No focal deficit present.      Mental Status: He is alert and oriented to person, place, and time.   Psychiatric:         Mood and Affect: Mood normal.         Behavior: Behavior normal.         Assessment/Plan   Problem List Items Addressed This Visit       Coronary artery disease involving native coronary artery of native heart without angina pectoris    Relevant Medications    rosuvastatin (Crestor) 5 mg tablet    Kappa light chain disease (Multi)    Mixed hyperlipidemia    Secondary malignant neoplasm of left lung (Multi)     Other Visit Diagnoses       Medicare annual wellness visit, subsequent    -  Primary    Health maintenance examination        Persistent depressive disorder        Encounter for immunization        Relevant Orders    Pneumococcal conjugate vaccine, 20-valent (PREVNAR 20) (Completed)    Depression screening               We reviewed appropriate preventative health screening guidelines.  We discussed regular aerobic exercise. We discussed proper nutrition and weight control.    Will continue Zoloft for the time being.  He is can let me know if he feels it needs increased  He follows with oncology for his kappa light chain disease as well as his secondary malignant neoplasm.

## 2024-05-30 NOTE — PATIENT INSTRUCTIONS
Recommendations for men annual wellness exam:   Colonoscopy at age 45 or earlier if positive family history of colon cancer   Discuss prostate cancer screening between ages 55-69 or sooner if family history of prostate cancer   One time screen for abdominal aortic aneurysm for men ages 65-75 who have ever smoked  Screening for lung cancer with low-dose CT in all adults age 55-77 who have a 30 pack-year smoking history and currently smoke or have quit within the past 15 years  Follow a healthy diet (Dash diet, Mediterranean diet)  Exercise 150 min/wk  Maintain healthy weight (BMI < 25)  Do not smoke   Alcohol in moderation (up to 2 drinks/day)   Get enough sleep (7-8 hours/night)  Make sure immunizations are up to date (influenza, pneumococcal, Tdap, covid, shingles if age > 50, RSV if >60)  Visit dentist twice yearly

## 2024-05-31 ENCOUNTER — INFUSION (OUTPATIENT)
Dept: INFUSION THERAPY | Facility: HOSPITAL | Age: 79
End: 2024-05-31
Payer: MEDICARE

## 2024-05-31 VITALS
OXYGEN SATURATION: 93 % | TEMPERATURE: 98 F | DIASTOLIC BLOOD PRESSURE: 89 MMHG | RESPIRATION RATE: 16 BRPM | HEART RATE: 82 BPM | SYSTOLIC BLOOD PRESSURE: 134 MMHG

## 2024-05-31 DIAGNOSIS — G61.81 CHRONIC INFLAMMATORY DEMYELINATING POLYNEURITIS (MULTI): ICD-10-CM

## 2024-05-31 LAB
ALBUMIN SERPL BCP-MCNC: 3.9 G/DL (ref 3.4–5)
ALP SERPL-CCNC: 58 U/L (ref 33–136)
ALT SERPL W P-5'-P-CCNC: 3 U/L (ref 10–52)
ANION GAP SERPL CALC-SCNC: 9 MMOL/L (ref 10–20)
AST SERPL W P-5'-P-CCNC: 17 U/L (ref 9–39)
BASOPHILS # BLD AUTO: 0.02 X10*3/UL (ref 0–0.1)
BASOPHILS NFR BLD AUTO: 0.3 %
BILIRUB SERPL-MCNC: 0.5 MG/DL (ref 0–1.2)
BUN SERPL-MCNC: 12 MG/DL (ref 6–23)
CALCIUM SERPL-MCNC: 9 MG/DL (ref 8.6–10.3)
CHLORIDE SERPL-SCNC: 103 MMOL/L (ref 98–107)
CO2 SERPL-SCNC: 29 MMOL/L (ref 21–32)
CREAT SERPL-MCNC: 0.74 MG/DL (ref 0.5–1.3)
EGFRCR SERPLBLD CKD-EPI 2021: >90 ML/MIN/1.73M*2
EOSINOPHIL # BLD AUTO: 0.04 X10*3/UL (ref 0–0.4)
EOSINOPHIL NFR BLD AUTO: 0.7 %
ERYTHROCYTE [DISTWIDTH] IN BLOOD BY AUTOMATED COUNT: 12.9 % (ref 11.5–14.5)
GLUCOSE SERPL-MCNC: 89 MG/DL (ref 74–99)
HCT VFR BLD AUTO: 39.2 % (ref 41–52)
HGB BLD-MCNC: 13.2 G/DL (ref 13.5–17.5)
IMM GRANULOCYTES # BLD AUTO: 0.01 X10*3/UL (ref 0–0.5)
IMM GRANULOCYTES NFR BLD AUTO: 0.2 % (ref 0–0.9)
LYMPHOCYTES # BLD AUTO: 1.21 X10*3/UL (ref 0.8–3)
LYMPHOCYTES NFR BLD AUTO: 19.9 %
MCH RBC QN AUTO: 31.6 PG (ref 26–34)
MCHC RBC AUTO-ENTMCNC: 33.7 G/DL (ref 32–36)
MCV RBC AUTO: 94 FL (ref 80–100)
MONOCYTES # BLD AUTO: 0.62 X10*3/UL (ref 0.05–0.8)
MONOCYTES NFR BLD AUTO: 10.2 %
NEUTROPHILS # BLD AUTO: 4.19 X10*3/UL (ref 1.6–5.5)
NEUTROPHILS NFR BLD AUTO: 68.7 %
NRBC BLD-RTO: 0 /100 WBCS (ref 0–0)
PLATELET # BLD AUTO: 142 X10*3/UL (ref 150–450)
POTASSIUM SERPL-SCNC: 4 MMOL/L (ref 3.5–5.3)
PROT SERPL-MCNC: 7 G/DL (ref 6.4–8.2)
RBC # BLD AUTO: 4.18 X10*6/UL (ref 4.5–5.9)
SODIUM SERPL-SCNC: 137 MMOL/L (ref 136–145)
WBC # BLD AUTO: 6.1 X10*3/UL (ref 4.4–11.3)

## 2024-05-31 PROCEDURE — 2500000004 HC RX 250 GENERAL PHARMACY W/ HCPCS (ALT 636 FOR OP/ED): Performed by: PSYCHIATRY & NEUROLOGY

## 2024-05-31 PROCEDURE — 2500000001 HC RX 250 WO HCPCS SELF ADMINISTERED DRUGS (ALT 637 FOR MEDICARE OP): Performed by: PSYCHIATRY & NEUROLOGY

## 2024-05-31 PROCEDURE — 80053 COMPREHEN METABOLIC PANEL: CPT

## 2024-05-31 PROCEDURE — 96366 THER/PROPH/DIAG IV INF ADDON: CPT | Mod: INF

## 2024-05-31 PROCEDURE — 96365 THER/PROPH/DIAG IV INF INIT: CPT | Mod: INF

## 2024-05-31 PROCEDURE — 85025 COMPLETE CBC W/AUTO DIFF WBC: CPT

## 2024-05-31 PROCEDURE — 96375 TX/PRO/DX INJ NEW DRUG ADDON: CPT | Mod: INF

## 2024-05-31 RX ORDER — HEPARIN SODIUM,PORCINE/PF 10 UNIT/ML
50 SYRINGE (ML) INTRAVENOUS AS NEEDED
OUTPATIENT
Start: 2024-05-31

## 2024-05-31 RX ORDER — DIPHENHYDRAMINE HCL 25 MG
25 CAPSULE ORAL ONCE
Status: COMPLETED | OUTPATIENT
Start: 2024-05-31 | End: 2024-05-31

## 2024-05-31 RX ORDER — ACETAMINOPHEN 325 MG/1
650 TABLET ORAL ONCE
OUTPATIENT
Start: 2024-06-21 | End: 2024-06-21

## 2024-05-31 RX ORDER — ACETAMINOPHEN 325 MG/1
650 TABLET ORAL ONCE
Status: COMPLETED | OUTPATIENT
Start: 2024-05-31 | End: 2024-05-31

## 2024-05-31 RX ORDER — HEPARIN 100 UNIT/ML
500 SYRINGE INTRAVENOUS AS NEEDED
Status: DISCONTINUED | OUTPATIENT
Start: 2024-05-31 | End: 2024-05-31 | Stop reason: HOSPADM

## 2024-05-31 RX ORDER — DIPHENHYDRAMINE HCL 25 MG
25 CAPSULE ORAL ONCE
OUTPATIENT
Start: 2024-06-21 | End: 2024-06-21

## 2024-05-31 RX ORDER — HEPARIN 100 UNIT/ML
500 SYRINGE INTRAVENOUS AS NEEDED
OUTPATIENT
Start: 2024-05-31

## 2024-05-31 RX ADMIN — Medication 500 UNITS: at 13:48

## 2024-05-31 RX ADMIN — ACETAMINOPHEN 650 MG: 325 TABLET ORAL at 09:02

## 2024-05-31 RX ADMIN — IMMUNE GLOBULIN (HUMAN) 70 G: 10 INJECTION INTRAVENOUS; SUBCUTANEOUS at 09:00

## 2024-05-31 RX ADMIN — HYDROCORTISONE SODIUM SUCCINATE 100 MG: 100 INJECTION, POWDER, FOR SOLUTION INTRAMUSCULAR; INTRAVENOUS at 09:03

## 2024-05-31 RX ADMIN — DIPHENHYDRAMINE HYDROCHLORIDE 25 MG: 25 CAPSULE ORAL at 09:02

## 2024-05-31 ASSESSMENT — ENCOUNTER SYMPTOMS
LOSS OF SENSATION IN FEET: 1
OCCASIONAL FEELINGS OF UNSTEADINESS: 1
DEPRESSION: 0

## 2024-06-10 ASSESSMENT — CUP TO DISC RATIO
OD_RATIO: .3
OS_RATIO: .3

## 2024-06-10 ASSESSMENT — EXTERNAL EXAM - RIGHT EYE: OD_EXAM: NORMAL

## 2024-06-10 ASSESSMENT — EXTERNAL EXAM - LEFT EYE: OS_EXAM: NORMAL

## 2024-06-10 NOTE — PROGRESS NOTES
PCO (posterior capsular opacification), [id number]  Pseudophakia of both eyesZ96.1  -CEIOL OU around 2019  -Patient recommended to have YAG PCO by previous ophthalmologist.  Vision OD did not improve significantly with dry eye treatment, PCO appears stable from 5/2022.  Discussed with patient option of YAG capsulotomy OD, however patient wishes to defer for now because he is not bothered by blurry vision OD. Patient feels OD is his better seeing eye. Some difficulty reading small print. Difficulty reading small words/numbers on TV. Patient does not drive.    -F/u 1 year for comprehensive exam with refraction, glare/BAT OD, dilation OU.     Eye atpszakD40.209  Keratoconjunctivitis sicca, not specified as Sjogren's, bilateral  -Both eyes tear, but OS>OD  -Was on tobramycin drops - discontinued  -Stopped nighttime ointment - stopped due to cloudy vision in the morning  -Continue refresh artificial tears 3-4 times a day  -Continue Restasis/Cyclosporine OU BID.  -May call or return to office if symptoms worsen.     ShuczxhdxfaengoO29.839  Acquired involutional ptosis of both ofxnjrzB73.403  -Noted L>R x 3-4 years. Left may be visually significant.   -History of sinus surgery ([place]) on R with orbital floor weakness and sunken eye - 1998  -Patient tears more on left side.  If condition does not improve, will consider referring to oculoplastic service for probe and irrigation.  -Discussed with patient that upper eyelid blepharoplasty may cause dry eye symptoms to worsen if not under good control with treatment.  -History of CIDP - possible ophthalmic manifestations to monitor for may include: Ophthalmoplegia, papilledema, proptosis, RAPD, anisocoria.  -Saw Dr. Mccoy 2/15/23, referred to Dr. Davidson for concern of possible MG. Saw Dr. Davidson 4/25/23 - not likely to be MG.  -Saw Dr. Mccoy again 7/20/23 - improvement in lid position, no surgery recommended. Recommended to continue dry eye treatment.      Family history of glaucoma in matsgjV32.511  Father possibly also had macular degeneration (not definitive)  -Monitor for now.     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 No high risk characteristics seen at this time. Recommend routine dilated eye exams yearly to monitor appearance.     IiulpveufL06.00  YmyfsrugdbjX25.209  -Obtained new glasses, but they are uncomfortable, already tried having adjusted with optical shop. Patient is wearing older glasses. Continue current glasses for now, declines Rx.   -F/u 1 year for comprehensive exam with refraction, glare/BAT OD, dilation OU.

## 2024-06-12 ENCOUNTER — APPOINTMENT (OUTPATIENT)
Dept: OPHTHALMOLOGY | Facility: CLINIC | Age: 79
End: 2024-06-12
Payer: MEDICARE

## 2024-06-12 DIAGNOSIS — H26.491 PCO (POSTERIOR CAPSULAR OPACIFICATION), RIGHT: Primary | ICD-10-CM

## 2024-06-12 DIAGNOSIS — H52.4 PRESBYOPIA: ICD-10-CM

## 2024-06-12 DIAGNOSIS — H02.831 DERMATOCHALASIS OF BOTH UPPER EYELIDS: ICD-10-CM

## 2024-06-12 DIAGNOSIS — H02.403 ACQUIRED INVOLUTIONAL PTOSIS OF BOTH EYELIDS: ICD-10-CM

## 2024-06-12 DIAGNOSIS — Z83.511 FAMILY HISTORY OF GLAUCOMA: ICD-10-CM

## 2024-06-12 DIAGNOSIS — Z96.1 PSEUDOPHAKIA: ICD-10-CM

## 2024-06-12 DIAGNOSIS — D31.32 CHOROIDAL NEVUS OF LEFT EYE: ICD-10-CM

## 2024-06-12 DIAGNOSIS — H04.203 EYE TEARING, BILATERAL: ICD-10-CM

## 2024-06-12 DIAGNOSIS — H02.834 DERMATOCHALASIS OF BOTH UPPER EYELIDS: ICD-10-CM

## 2024-06-12 DIAGNOSIS — H52.03 HYPEROPIA, BILATERAL: ICD-10-CM

## 2024-06-12 DIAGNOSIS — H52.203 ASTIGMATISM OF BOTH EYES, UNSPECIFIED TYPE: ICD-10-CM

## 2024-06-12 DIAGNOSIS — H16.223 KERATOCONJUNCTIVITIS SICCA OF BOTH EYES NOT SPECIFIED AS SJOGREN'S: ICD-10-CM

## 2024-06-12 PROCEDURE — 92014 COMPRE OPH EXAM EST PT 1/>: CPT | Performed by: OPHTHALMOLOGY

## 2024-06-12 RX ORDER — CYCLOSPORINE 0.5 MG/ML
1 EMULSION OPHTHALMIC EVERY 12 HOURS
Qty: 180 EACH | Refills: 3 | Status: SHIPPED | OUTPATIENT
Start: 2024-06-12

## 2024-06-12 ASSESSMENT — ENCOUNTER SYMPTOMS
ENDOCRINE NEGATIVE: 0
MUSCULOSKELETAL NEGATIVE: 0
HEMATOLOGIC/LYMPHATIC NEGATIVE: 0
NEUROLOGICAL NEGATIVE: 0
ALLERGIC/IMMUNOLOGIC NEGATIVE: 0
CARDIOVASCULAR NEGATIVE: 0
RESPIRATORY NEGATIVE: 0
GASTROINTESTINAL NEGATIVE: 0
EYES NEGATIVE: 1
CONSTITUTIONAL NEGATIVE: 0
PSYCHIATRIC NEGATIVE: 0

## 2024-06-12 ASSESSMENT — REFRACTION_MANIFEST
OD_ADD: +2.50
OS_CYLINDER: -1.75
OS_SPHERE: +1.75
OD_CYLINDER: -1.75
OD_AXIS: 100
OS_AXIS: 055
OD_SPHERE: +1.25
OS_ADD: +2.50

## 2024-06-12 ASSESSMENT — VISUAL ACUITY
CORRECTION_TYPE: GLASSES
METHOD: SNELLEN - LINEAR
OS_BAT_MED: 20/50
OD_CC: 20/25
OD_BAT_MED: 20/50
OS_CC: 20/30
OD_CC+: -1
OS_CC+: +1

## 2024-06-12 ASSESSMENT — REFRACTION_WEARINGRX
OS_CYLINDER: -1.75
OD_AXIS: 098
OS_SPHERE: +1.50
OS_ADD: +2.75
OD_ADD: +2.75
OD_SPHERE: +1.25
OD_CYLINDER: -1.50
OS_AXIS: 059

## 2024-06-12 ASSESSMENT — CONF VISUAL FIELD
OD_INFERIOR_NASAL_RESTRICTION: 0
OD_SUPERIOR_NASAL_RESTRICTION: 0
OD_SUPERIOR_TEMPORAL_RESTRICTION: 0
OD_NORMAL: 1
OS_INFERIOR_NASAL_RESTRICTION: 0
OS_SUPERIOR_TEMPORAL_RESTRICTION: 0
OS_INFERIOR_TEMPORAL_RESTRICTION: 0
OS_SUPERIOR_NASAL_RESTRICTION: 0
OS_NORMAL: 1
OD_INFERIOR_TEMPORAL_RESTRICTION: 0

## 2024-06-12 ASSESSMENT — TONOMETRY
IOP_METHOD: GOLDMANN APPLANATION
OS_IOP_MMHG: 12
OD_IOP_MMHG: 14

## 2024-06-14 ENCOUNTER — APPOINTMENT (OUTPATIENT)
Dept: INFUSION THERAPY | Facility: HOSPITAL | Age: 79
End: 2024-06-14
Payer: MEDICARE

## 2024-06-21 ENCOUNTER — INFUSION (OUTPATIENT)
Dept: INFUSION THERAPY | Facility: HOSPITAL | Age: 79
End: 2024-06-21
Payer: MEDICARE

## 2024-06-21 VITALS
RESPIRATION RATE: 20 BRPM | DIASTOLIC BLOOD PRESSURE: 91 MMHG | WEIGHT: 162.26 LBS | HEART RATE: 80 BPM | TEMPERATURE: 97.4 F | SYSTOLIC BLOOD PRESSURE: 152 MMHG | BODY MASS INDEX: 24.31 KG/M2 | OXYGEN SATURATION: 97 %

## 2024-06-21 DIAGNOSIS — G61.81 CHRONIC INFLAMMATORY DEMYELINATING POLYNEURITIS (MULTI): Primary | ICD-10-CM

## 2024-06-21 DIAGNOSIS — G61.81 CHRONIC INFLAMMATORY DEMYELINATING POLYNEURITIS (MULTI): ICD-10-CM

## 2024-06-21 LAB
ALBUMIN SERPL BCP-MCNC: 3.8 G/DL (ref 3.4–5)
ALP SERPL-CCNC: 58 U/L (ref 33–136)
ALT SERPL W P-5'-P-CCNC: 4 U/L (ref 10–52)
ANION GAP SERPL CALC-SCNC: 12 MMOL/L (ref 10–20)
AST SERPL W P-5'-P-CCNC: 18 U/L (ref 9–39)
BASOPHILS # BLD AUTO: 0.01 X10*3/UL (ref 0–0.1)
BASOPHILS NFR BLD AUTO: 0.2 %
BILIRUB SERPL-MCNC: 0.6 MG/DL (ref 0–1.2)
BUN SERPL-MCNC: 12 MG/DL (ref 6–23)
CALCIUM SERPL-MCNC: 9 MG/DL (ref 8.6–10.3)
CHLORIDE SERPL-SCNC: 102 MMOL/L (ref 98–107)
CO2 SERPL-SCNC: 26 MMOL/L (ref 21–32)
CREAT SERPL-MCNC: 0.81 MG/DL (ref 0.5–1.3)
EGFRCR SERPLBLD CKD-EPI 2021: >90 ML/MIN/1.73M*2
EOSINOPHIL # BLD AUTO: 0.04 X10*3/UL (ref 0–0.4)
EOSINOPHIL NFR BLD AUTO: 1 %
ERYTHROCYTE [DISTWIDTH] IN BLOOD BY AUTOMATED COUNT: 12.7 % (ref 11.5–14.5)
GLUCOSE SERPL-MCNC: 93 MG/DL (ref 74–99)
HCT VFR BLD AUTO: 38 % (ref 41–52)
HGB BLD-MCNC: 12.9 G/DL (ref 13.5–17.5)
IMM GRANULOCYTES # BLD AUTO: 0.02 X10*3/UL (ref 0–0.5)
IMM GRANULOCYTES NFR BLD AUTO: 0.5 % (ref 0–0.9)
LYMPHOCYTES # BLD AUTO: 1.39 X10*3/UL (ref 0.8–3)
LYMPHOCYTES NFR BLD AUTO: 33.4 %
MCH RBC QN AUTO: 32 PG (ref 26–34)
MCHC RBC AUTO-ENTMCNC: 33.9 G/DL (ref 32–36)
MCV RBC AUTO: 94 FL (ref 80–100)
MONOCYTES # BLD AUTO: 0.51 X10*3/UL (ref 0.05–0.8)
MONOCYTES NFR BLD AUTO: 12.3 %
NEUTROPHILS # BLD AUTO: 2.19 X10*3/UL (ref 1.6–5.5)
NEUTROPHILS NFR BLD AUTO: 52.6 %
NRBC BLD-RTO: 0 /100 WBCS (ref 0–0)
PLATELET # BLD AUTO: 141 X10*3/UL (ref 150–450)
POTASSIUM SERPL-SCNC: 4.1 MMOL/L (ref 3.5–5.3)
PROT SERPL-MCNC: 7 G/DL (ref 6.4–8.2)
RBC # BLD AUTO: 4.03 X10*6/UL (ref 4.5–5.9)
SODIUM SERPL-SCNC: 136 MMOL/L (ref 136–145)
WBC # BLD AUTO: 4.2 X10*3/UL (ref 4.4–11.3)

## 2024-06-21 PROCEDURE — 96365 THER/PROPH/DIAG IV INF INIT: CPT | Mod: INF

## 2024-06-21 PROCEDURE — 2500000004 HC RX 250 GENERAL PHARMACY W/ HCPCS (ALT 636 FOR OP/ED): Performed by: PSYCHIATRY & NEUROLOGY

## 2024-06-21 PROCEDURE — 2500000001 HC RX 250 WO HCPCS SELF ADMINISTERED DRUGS (ALT 637 FOR MEDICARE OP): Performed by: PSYCHIATRY & NEUROLOGY

## 2024-06-21 PROCEDURE — 85025 COMPLETE CBC W/AUTO DIFF WBC: CPT

## 2024-06-21 PROCEDURE — 84075 ASSAY ALKALINE PHOSPHATASE: CPT

## 2024-06-21 PROCEDURE — 96366 THER/PROPH/DIAG IV INF ADDON: CPT | Mod: INF

## 2024-06-21 PROCEDURE — 96375 TX/PRO/DX INJ NEW DRUG ADDON: CPT | Mod: INF

## 2024-06-21 RX ORDER — HEPARIN 100 UNIT/ML
500 SYRINGE INTRAVENOUS AS NEEDED
Status: DISCONTINUED | OUTPATIENT
Start: 2024-06-21 | End: 2024-06-21 | Stop reason: HOSPADM

## 2024-06-21 RX ORDER — HEPARIN SODIUM,PORCINE/PF 10 UNIT/ML
50 SYRINGE (ML) INTRAVENOUS AS NEEDED
OUTPATIENT
Start: 2024-06-21

## 2024-06-21 RX ORDER — ACETAMINOPHEN 325 MG/1
650 TABLET ORAL ONCE
Status: COMPLETED | OUTPATIENT
Start: 2024-06-21 | End: 2024-06-21

## 2024-06-21 RX ORDER — ACETAMINOPHEN 325 MG/1
650 TABLET ORAL ONCE
OUTPATIENT
Start: 2024-07-12 | End: 2024-07-12

## 2024-06-21 RX ORDER — DIPHENHYDRAMINE HCL 25 MG
25 CAPSULE ORAL ONCE
OUTPATIENT
Start: 2024-07-12 | End: 2024-07-12

## 2024-06-21 RX ORDER — DIPHENHYDRAMINE HCL 25 MG
25 CAPSULE ORAL ONCE
Status: COMPLETED | OUTPATIENT
Start: 2024-06-21 | End: 2024-06-21

## 2024-06-21 RX ORDER — HEPARIN 100 UNIT/ML
500 SYRINGE INTRAVENOUS AS NEEDED
OUTPATIENT
Start: 2024-06-21

## 2024-06-21 ASSESSMENT — PATIENT HEALTH QUESTIONNAIRE - PHQ9
SUM OF ALL RESPONSES TO PHQ9 QUESTIONS 1 AND 2: 0
1. LITTLE INTEREST OR PLEASURE IN DOING THINGS: NOT AT ALL
2. FEELING DOWN, DEPRESSED OR HOPELESS: NOT AT ALL

## 2024-06-21 ASSESSMENT — PAIN SCALES - GENERAL: PAINLEVEL: 0-NO PAIN

## 2024-06-21 ASSESSMENT — ENCOUNTER SYMPTOMS
LOSS OF SENSATION IN FEET: 1
DEPRESSION: 0
OCCASIONAL FEELINGS OF UNSTEADINESS: 1

## 2024-07-11 DIAGNOSIS — G20.A1 PARKINSON'S DISEASE, UNSPECIFIED WHETHER DYSKINESIA PRESENT, UNSPECIFIED WHETHER MANIFESTATIONS FLUCTUATE (MULTI): ICD-10-CM

## 2024-07-11 DIAGNOSIS — G61.81 CHRONIC INFLAMMATORY DEMYELINATING NEUROPATHY (MULTI): Primary | ICD-10-CM

## 2024-07-12 ENCOUNTER — INFUSION (OUTPATIENT)
Dept: INFUSION THERAPY | Facility: HOSPITAL | Age: 79
End: 2024-07-12
Payer: MEDICARE

## 2024-07-12 VITALS
OXYGEN SATURATION: 98 % | RESPIRATION RATE: 20 BRPM | TEMPERATURE: 97.1 F | WEIGHT: 163.8 LBS | BODY MASS INDEX: 24.54 KG/M2 | HEART RATE: 78 BPM | SYSTOLIC BLOOD PRESSURE: 150 MMHG | DIASTOLIC BLOOD PRESSURE: 91 MMHG

## 2024-07-12 DIAGNOSIS — G61.81 CHRONIC INFLAMMATORY DEMYELINATING POLYNEURITIS (MULTI): ICD-10-CM

## 2024-07-12 LAB
ALBUMIN SERPL BCP-MCNC: 3.8 G/DL (ref 3.4–5)
ALP SERPL-CCNC: 59 U/L (ref 33–136)
ALT SERPL W P-5'-P-CCNC: 6 U/L (ref 10–52)
ANION GAP SERPL CALC-SCNC: 11 MMOL/L
AST SERPL W P-5'-P-CCNC: 18 U/L (ref 9–39)
BASOPHILS # BLD AUTO: 0.02 X10*3/UL (ref 0–0.1)
BASOPHILS NFR BLD AUTO: 0.5 %
BILIRUB SERPL-MCNC: 0.6 MG/DL (ref 0–1.2)
BUN SERPL-MCNC: 14 MG/DL (ref 6–23)
CALCIUM SERPL-MCNC: 9.1 MG/DL (ref 8.6–10.3)
CHLORIDE SERPL-SCNC: 101 MMOL/L (ref 98–107)
CO2 SERPL-SCNC: 27 MMOL/L (ref 21–32)
CREAT SERPL-MCNC: 0.78 MG/DL (ref 0.5–1.3)
EGFRCR SERPLBLD CKD-EPI 2021: >90 ML/MIN/1.73M*2
EOSINOPHIL # BLD AUTO: 0.02 X10*3/UL (ref 0–0.4)
EOSINOPHIL NFR BLD AUTO: 0.5 %
ERYTHROCYTE [DISTWIDTH] IN BLOOD BY AUTOMATED COUNT: 12.9 % (ref 11.5–14.5)
GLUCOSE SERPL-MCNC: 82 MG/DL (ref 74–99)
HCT VFR BLD AUTO: 37.6 % (ref 41–52)
HGB BLD-MCNC: 12.8 G/DL (ref 13.5–17.5)
IMM GRANULOCYTES # BLD AUTO: 0.01 X10*3/UL (ref 0–0.5)
IMM GRANULOCYTES NFR BLD AUTO: 0.2 % (ref 0–0.9)
LYMPHOCYTES # BLD AUTO: 1.39 X10*3/UL (ref 0.8–3)
LYMPHOCYTES NFR BLD AUTO: 33.4 %
MCH RBC QN AUTO: 32 PG (ref 26–34)
MCHC RBC AUTO-ENTMCNC: 34 G/DL (ref 32–36)
MCV RBC AUTO: 94 FL (ref 80–100)
MONOCYTES # BLD AUTO: 0.46 X10*3/UL (ref 0.05–0.8)
MONOCYTES NFR BLD AUTO: 11.1 %
NEUTROPHILS # BLD AUTO: 2.26 X10*3/UL (ref 1.6–5.5)
NEUTROPHILS NFR BLD AUTO: 54.3 %
NRBC BLD-RTO: 0 /100 WBCS (ref 0–0)
PLATELET # BLD AUTO: 138 X10*3/UL (ref 150–450)
POTASSIUM SERPL-SCNC: 4.2 MMOL/L (ref 3.5–5.3)
PROT SERPL-MCNC: 7.1 G/DL (ref 6.4–8.2)
RBC # BLD AUTO: 4 X10*6/UL (ref 4.5–5.9)
SODIUM SERPL-SCNC: 135 MMOL/L (ref 136–145)
WBC # BLD AUTO: 4.2 X10*3/UL (ref 4.4–11.3)

## 2024-07-12 PROCEDURE — 2500000001 HC RX 250 WO HCPCS SELF ADMINISTERED DRUGS (ALT 637 FOR MEDICARE OP): Performed by: PSYCHIATRY & NEUROLOGY

## 2024-07-12 PROCEDURE — 96375 TX/PRO/DX INJ NEW DRUG ADDON: CPT | Mod: INF

## 2024-07-12 PROCEDURE — 96366 THER/PROPH/DIAG IV INF ADDON: CPT | Mod: INF

## 2024-07-12 PROCEDURE — 80053 COMPREHEN METABOLIC PANEL: CPT

## 2024-07-12 PROCEDURE — 2500000004 HC RX 250 GENERAL PHARMACY W/ HCPCS (ALT 636 FOR OP/ED): Performed by: PSYCHIATRY & NEUROLOGY

## 2024-07-12 PROCEDURE — 96365 THER/PROPH/DIAG IV INF INIT: CPT | Mod: INF

## 2024-07-12 PROCEDURE — 85025 COMPLETE CBC W/AUTO DIFF WBC: CPT

## 2024-07-12 PROCEDURE — 36591 DRAW BLOOD OFF VENOUS DEVICE: CPT

## 2024-07-12 RX ORDER — HEPARIN SODIUM,PORCINE/PF 10 UNIT/ML
50 SYRINGE (ML) INTRAVENOUS AS NEEDED
OUTPATIENT
Start: 2024-07-12

## 2024-07-12 RX ORDER — ACETAMINOPHEN 325 MG/1
650 TABLET ORAL ONCE
Status: COMPLETED | OUTPATIENT
Start: 2024-07-12 | End: 2024-07-12

## 2024-07-12 RX ORDER — HEPARIN SODIUM,PORCINE/PF 10 UNIT/ML
50 SYRINGE (ML) INTRAVENOUS AS NEEDED
Status: DISCONTINUED | OUTPATIENT
Start: 2024-07-12 | End: 2024-07-12 | Stop reason: HOSPADM

## 2024-07-12 RX ORDER — DIPHENHYDRAMINE HCL 25 MG
25 CAPSULE ORAL ONCE
Status: COMPLETED | OUTPATIENT
Start: 2024-07-12 | End: 2024-07-12

## 2024-07-12 RX ORDER — HEPARIN 100 UNIT/ML
500 SYRINGE INTRAVENOUS AS NEEDED
OUTPATIENT
Start: 2024-07-12

## 2024-07-12 RX ORDER — ACETAMINOPHEN 325 MG/1
650 TABLET ORAL ONCE
OUTPATIENT
Start: 2024-08-02 | End: 2024-08-02

## 2024-07-12 RX ORDER — DIPHENHYDRAMINE HCL 25 MG
25 CAPSULE ORAL ONCE
OUTPATIENT
Start: 2024-08-02 | End: 2024-08-02

## 2024-07-12 RX ORDER — HEPARIN 100 UNIT/ML
500 SYRINGE INTRAVENOUS AS NEEDED
Status: DISCONTINUED | OUTPATIENT
Start: 2024-07-12 | End: 2024-07-12 | Stop reason: HOSPADM

## 2024-07-12 ASSESSMENT — ENCOUNTER SYMPTOMS
LOSS OF SENSATION IN FEET: 0
DEPRESSION: 0
OCCASIONAL FEELINGS OF UNSTEADINESS: 0

## 2024-07-12 ASSESSMENT — PAIN SCALES - GENERAL
PAINLEVEL_OUTOF10: 0 - NO PAIN
PAINLEVEL: 0-NO PAIN

## 2024-08-02 ENCOUNTER — INFUSION (OUTPATIENT)
Dept: INFUSION THERAPY | Facility: HOSPITAL | Age: 79
End: 2024-08-02
Payer: MEDICARE

## 2024-08-02 VITALS
DIASTOLIC BLOOD PRESSURE: 90 MMHG | OXYGEN SATURATION: 99 % | RESPIRATION RATE: 18 BRPM | SYSTOLIC BLOOD PRESSURE: 157 MMHG | WEIGHT: 164.02 LBS | BODY MASS INDEX: 24.58 KG/M2 | HEART RATE: 76 BPM | TEMPERATURE: 97.8 F

## 2024-08-02 DIAGNOSIS — G61.81 CHRONIC INFLAMMATORY DEMYELINATING POLYNEURITIS (MULTI): ICD-10-CM

## 2024-08-02 LAB
ALBUMIN SERPL BCP-MCNC: 3.8 G/DL (ref 3.4–5)
ALP SERPL-CCNC: 67 U/L (ref 33–136)
ALT SERPL W P-5'-P-CCNC: 5 U/L (ref 10–52)
ANION GAP SERPL CALC-SCNC: 9 MMOL/L (ref 10–20)
AST SERPL W P-5'-P-CCNC: 16 U/L (ref 9–39)
BASOPHILS # BLD AUTO: 0.01 X10*3/UL (ref 0–0.1)
BASOPHILS NFR BLD AUTO: 0.2 %
BILIRUB SERPL-MCNC: 0.5 MG/DL (ref 0–1.2)
BUN SERPL-MCNC: 15 MG/DL (ref 6–23)
CALCIUM SERPL-MCNC: 9 MG/DL (ref 8.6–10.3)
CHLORIDE SERPL-SCNC: 104 MMOL/L (ref 98–107)
CO2 SERPL-SCNC: 29 MMOL/L (ref 21–32)
CREAT SERPL-MCNC: 0.81 MG/DL (ref 0.5–1.3)
EGFRCR SERPLBLD CKD-EPI 2021: >90 ML/MIN/1.73M*2
EOSINOPHIL # BLD AUTO: 0.02 X10*3/UL (ref 0–0.4)
EOSINOPHIL NFR BLD AUTO: 0.4 %
ERYTHROCYTE [DISTWIDTH] IN BLOOD BY AUTOMATED COUNT: 12.8 % (ref 11.5–14.5)
GLUCOSE SERPL-MCNC: 73 MG/DL (ref 74–99)
HCT VFR BLD AUTO: 37.7 % (ref 41–52)
HGB BLD-MCNC: 12.8 G/DL (ref 13.5–17.5)
IMM GRANULOCYTES # BLD AUTO: 0.01 X10*3/UL (ref 0–0.5)
IMM GRANULOCYTES NFR BLD AUTO: 0.2 % (ref 0–0.9)
LYMPHOCYTES # BLD AUTO: 1.41 X10*3/UL (ref 0.8–3)
LYMPHOCYTES NFR BLD AUTO: 29.8 %
MCH RBC QN AUTO: 32.7 PG (ref 26–34)
MCHC RBC AUTO-ENTMCNC: 34 G/DL (ref 32–36)
MCV RBC AUTO: 96 FL (ref 80–100)
MONOCYTES # BLD AUTO: 0.55 X10*3/UL (ref 0.05–0.8)
MONOCYTES NFR BLD AUTO: 11.6 %
NEUTROPHILS # BLD AUTO: 2.73 X10*3/UL (ref 1.6–5.5)
NEUTROPHILS NFR BLD AUTO: 57.8 %
NRBC BLD-RTO: 0 /100 WBCS (ref 0–0)
PLATELET # BLD AUTO: 138 X10*3/UL (ref 150–450)
POTASSIUM SERPL-SCNC: 4.2 MMOL/L (ref 3.5–5.3)
PROT SERPL-MCNC: 7.2 G/DL (ref 6.4–8.2)
RBC # BLD AUTO: 3.92 X10*6/UL (ref 4.5–5.9)
SODIUM SERPL-SCNC: 138 MMOL/L (ref 136–145)
WBC # BLD AUTO: 4.7 X10*3/UL (ref 4.4–11.3)

## 2024-08-02 PROCEDURE — 85025 COMPLETE CBC W/AUTO DIFF WBC: CPT

## 2024-08-02 PROCEDURE — 2500000001 HC RX 250 WO HCPCS SELF ADMINISTERED DRUGS (ALT 637 FOR MEDICARE OP): Performed by: PSYCHIATRY & NEUROLOGY

## 2024-08-02 PROCEDURE — 96365 THER/PROPH/DIAG IV INF INIT: CPT | Mod: INF

## 2024-08-02 PROCEDURE — 2500000004 HC RX 250 GENERAL PHARMACY W/ HCPCS (ALT 636 FOR OP/ED): Performed by: PSYCHIATRY & NEUROLOGY

## 2024-08-02 PROCEDURE — 96366 THER/PROPH/DIAG IV INF ADDON: CPT | Mod: INF

## 2024-08-02 PROCEDURE — 80053 COMPREHEN METABOLIC PANEL: CPT

## 2024-08-02 RX ORDER — DIPHENHYDRAMINE HCL 25 MG
25 CAPSULE ORAL ONCE
OUTPATIENT
Start: 2024-08-23 | End: 2024-08-23

## 2024-08-02 RX ORDER — DIPHENHYDRAMINE HCL 25 MG
25 CAPSULE ORAL ONCE
Status: COMPLETED | OUTPATIENT
Start: 2024-08-02 | End: 2024-08-02

## 2024-08-02 RX ORDER — HEPARIN 100 UNIT/ML
500 SYRINGE INTRAVENOUS AS NEEDED
OUTPATIENT
Start: 2024-08-02

## 2024-08-02 RX ORDER — ACETAMINOPHEN 325 MG/1
650 TABLET ORAL ONCE
OUTPATIENT
Start: 2024-08-23 | End: 2024-08-23

## 2024-08-02 RX ORDER — HEPARIN 100 UNIT/ML
500 SYRINGE INTRAVENOUS AS NEEDED
Status: DISCONTINUED | OUTPATIENT
Start: 2024-08-02 | End: 2024-08-02 | Stop reason: HOSPADM

## 2024-08-02 RX ORDER — ACETAMINOPHEN 325 MG/1
650 TABLET ORAL ONCE
Status: COMPLETED | OUTPATIENT
Start: 2024-08-02 | End: 2024-08-02

## 2024-08-02 RX ORDER — HEPARIN SODIUM,PORCINE/PF 10 UNIT/ML
50 SYRINGE (ML) INTRAVENOUS AS NEEDED
OUTPATIENT
Start: 2024-08-02

## 2024-08-02 SDOH — ECONOMIC STABILITY: FOOD INSECURITY: WITHIN THE PAST 12 MONTHS, YOU WORRIED THAT YOUR FOOD WOULD RUN OUT BEFORE YOU GOT MONEY TO BUY MORE.: NEVER TRUE

## 2024-08-02 SDOH — ECONOMIC STABILITY: FOOD INSECURITY: WITHIN THE PAST 12 MONTHS, THE FOOD YOU BOUGHT JUST DIDN'T LAST AND YOU DIDN'T HAVE MONEY TO GET MORE.: NEVER TRUE

## 2024-08-02 ASSESSMENT — ENCOUNTER SYMPTOMS
DEPRESSION: 1
LOSS OF SENSATION IN FEET: 1
OCCASIONAL FEELINGS OF UNSTEADINESS: 1

## 2024-08-02 ASSESSMENT — PATIENT HEALTH QUESTIONNAIRE - PHQ9
1. LITTLE INTEREST OR PLEASURE IN DOING THINGS: NOT AT ALL
SUM OF ALL RESPONSES TO PHQ9 QUESTIONS 1 AND 2: 1
2. FEELING DOWN, DEPRESSED OR HOPELESS: SEVERAL DAYS

## 2024-08-12 ENCOUNTER — OFFICE VISIT (OUTPATIENT)
Dept: HEMATOLOGY/ONCOLOGY | Facility: CLINIC | Age: 79
End: 2024-08-12
Payer: MEDICARE

## 2024-08-12 VITALS
OXYGEN SATURATION: 97 % | SYSTOLIC BLOOD PRESSURE: 134 MMHG | RESPIRATION RATE: 16 BRPM | DIASTOLIC BLOOD PRESSURE: 85 MMHG | WEIGHT: 162.92 LBS | BODY MASS INDEX: 24.41 KG/M2 | TEMPERATURE: 97.9 F | HEART RATE: 73 BPM

## 2024-08-12 DIAGNOSIS — C34.90 NON-SMALL CELL LUNG CANCER, UNSPECIFIED LATERALITY (MULTI): Primary | ICD-10-CM

## 2024-08-12 DIAGNOSIS — D47.2 MGUS (MONOCLONAL GAMMOPATHY OF UNKNOWN SIGNIFICANCE): ICD-10-CM

## 2024-08-12 LAB
IGG SERPL-MCNC: 2230 MG/DL (ref 700–1600)
PROT SERPL-MCNC: 8.4 G/DL (ref 6.4–8.2)

## 2024-08-12 PROCEDURE — 1126F AMNT PAIN NOTED NONE PRSNT: CPT | Performed by: INTERNAL MEDICINE

## 2024-08-12 PROCEDURE — 1157F ADVNC CARE PLAN IN RCRD: CPT | Performed by: INTERNAL MEDICINE

## 2024-08-12 PROCEDURE — 1160F RVW MEDS BY RX/DR IN RCRD: CPT | Performed by: INTERNAL MEDICINE

## 2024-08-12 PROCEDURE — 1159F MED LIST DOCD IN RCRD: CPT | Performed by: INTERNAL MEDICINE

## 2024-08-12 PROCEDURE — 86334 IMMUNOFIX E-PHORESIS SERUM: CPT | Mod: PORLAB | Performed by: INTERNAL MEDICINE

## 2024-08-12 PROCEDURE — 82784 ASSAY IGA/IGD/IGG/IGM EACH: CPT | Mod: PORLAB | Performed by: INTERNAL MEDICINE

## 2024-08-12 PROCEDURE — 83521 IG LIGHT CHAINS FREE EACH: CPT | Mod: PORLAB | Performed by: INTERNAL MEDICINE

## 2024-08-12 PROCEDURE — 99214 OFFICE O/P EST MOD 30 MIN: CPT | Performed by: INTERNAL MEDICINE

## 2024-08-12 PROCEDURE — 84155 ASSAY OF PROTEIN SERUM: CPT | Mod: PORLAB | Performed by: INTERNAL MEDICINE

## 2024-08-12 ASSESSMENT — NCCN CANCER DISTRESS MANAGEMENT
NCCN PHYSICAL CONCERNS: 9
NCCN EMOTIONAL CONCERNS: 8
NCCN PHYSICAL CONCERNS: 1

## 2024-08-12 ASSESSMENT — PAIN SCALES - GENERAL: PAINLEVEL: 0-NO PAIN

## 2024-08-12 NOTE — PROGRESS NOTES
"  Patient Visit Information:   Visit Type: Follow Up Visit   I am aggressive  History of Present Illness:      ID Statement:    MITCHELL COLE is a 78 year old Male        Interval History:    8/12/24  Mr. Cole returns today for follow-up of MGUS.  Since his last visit,   Hemoglobin, renal function have been stable no any other complaint.    He has some generalized weakness and fatigue, but denies any fever, chills, increased  shortness of breath, worsening neurologic symptoms, new musculoskeletal pain or other significant symptoms.  Appetite is good.  He is gaining weight.   Complaining of knee pain, history of fall      Past medical history: IgM MGUS : Work-up initiated in February 2020 for polyneuropathy showed elevated kappa/lambda light chain ratio of 2.58.  Urine ROSA MARIA negative.   5/27/2020 K/L ratio 2.69, normal IgG, IgA and IgM levels, SPEP/ROSA MARIA showed M protein 0.2 g/dL.  He was followed clinically.  Bone marrow biopsy not indicated.  12/7/2020 K/L ratio 2.04.  M protein 0.2 g/dL.   12/6/2021: K/L ratio slightly higher at 4.16.  M protein stable at 0.2 g/dL.  Normal CBC, creatinine, calcium.  6/23/2022: K/Lratio 3.95.  IgM stable 232.  M protein stable at 0.2 g/dL.  1/30/2023: SPEP/ROSA MARIA: Small, stable M protein 0.2 g/dL.  Kappa/lambda ratio slightly higher at 5.4.  (IgM level not done?)  July 28, 2023, M protein 0.2 g per DL, IgG 1650, kappa light chain 5.43, lambda light chain 1.04, ratio 5.22  2/12/24 , M spike 0.2, IgG level 1710, kappa light chain 6.1, lambda light chain 0.9, ratio 6.64  Non-small cell lung cancer:  CT chest done12/2021 showed 1.4 cm LLL lesion.  PET scan done in January 2022 showed hypermetabolic activity  in the LLL lesion only.  CT-guided biopsy done 2/9/2022 showed non-small cell carcinoma.  Because of his comorbidities, he had SBRT completing his fifth fraction on 3/21/2022 without complication.  9/29/2022: CT chest showed \"new\" subcentimeter pulmonary  nodules of uncertain " significance.  11/18/2022: CT chest showed left lung density consistent with radiation pneumonitis, small subcentimeter nodules.  He was seen by medical oncologist, Dr. Mcgill and by his radiation oncologist, Dr. Fitzgerald.  Follow-up  scans are scheduled for 2/13/2023.  Hx. CIDP (chronic inflammatory demyelinating polyneuropathy), symptoms began 2008.  Sural nerve biopsy negative, negative anti-MAG Ab test; not attributed to MGUS. Receiving IVIG every 3 weeks.   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  History of cataract surgery, subdural hematoma, craniotomy, sinus surgery, hernia repair, knee surgery, carpal  tunnel surgery, spinal stenosis, trigger finger surgery, ED. COVID May 2022.  Non-small cell lung cancer LLL diagnosed February 2022, status post SBRT completed March 2022.     Social history: Never smoker. Nondrinker.  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  Was exposed to carbon and other exhaust.   No significant secondhand smoke exposure.     Review of Systems:   Review of Systems:    Constitutional: No fever, chills, night sweats.  Mild fatigue.  Head and neck: No headaches or dizziness.  No pain, stiffness.   HEENT: No sore throat, sinusitis.  Hearing is adequate. Vison is adequate.   Cardiac: No chest pain, palpitations, lightheadedness.   Respiratory: No increased dyspnea, cough, hemoptysis.   GI: Appetite is good, weight stable.  Occasional, mild constipation, but no diarrhea, change in bowel movements, melena, hematochezia. No abdominal pain, nausea, vomiting.   Genitourinary: No frequency, urgency.  No polyuria, dysuria, hematuria. s/p cystoprostatectomy, urinary diversion/ureterostomy.  Musculoskeletal: No worsening bone or joint pain.  Chronic arthralgias.  Endocrine: No diabetes, thyroid disease.   Skin: No rash, skin lesions, itching.   Neuromuscular: No lightheadedness,  dizziness.  No history of seizure.  Motor and sensory deficit due to neuromuscular disorder/polyneuropathy.                 Allergies and Intolerances:       Allergies:         penicillin: Drug, Unknown, Active         oxycodone: Drug, Psychosis, Active     Outpatient Medication Profile:  * Patient Currently Takes Medications as of 10-Aug-2023 10:54 documented in Structured Notes         Colace 100 mg oral capsule : Last Dose Taken:  , 1 cap(s) orally 2 times a day, As Needed, Start Date: 07-Jul-2022         multivitamin Multiple Vitamins oral tablet: Last Dose Taken:  , 1 tab(s)  oral once a day         Melatonin 5 mg oral tablet: Last Dose Taken:  , 1 tab(s) orally once  a day (at bedtime) as needed         Aspir 81 oral delayed release tablet: Last Dose Taken:  , 1 tab(s) orally  once a day         fluticasone nasal: Last Dose Taken:  , 2 spray(s) nasal prn         MiraLax oral powder for reconstitution: Last Dose Taken:  , 1 dose(s)  orally once a day, As Needed         Refresh ophthalmic solution: Last Dose Taken:  , 1 drop(s) to each affected  eye 4 times a day, As Needed         Advil 200 mg oral tablet: Last Dose Taken:  , 1 tab(s) orally every 6  hours, As Needed         Gamunex-C 10% injectable solution: Last Dose Taken:  , 70 unit(s) injectable  every 3 weeks         diclofenac 1% topical gel: Last Dose Taken:           Metamucil: Last Dose Taken:  , as needed         ketoconazole 2% topical shampoo: Last Dose Taken:  , Apply topically  to affected area once         predniSONE 10 mg oral tablet: Last Dose Taken:  , 0.5 tab(s) orally once  a day         Restasis: Last Dose Taken:  , 1 drop(s) to each affected eye 2 times  a day         carbidopa-levodopa 23.75 mg-95 mg oral capsule, extended release: Last  Dose Taken:  , unknown dosage. 1.5 Am, 1.5 at lunch, 1 at dinner.             Medical History:         History of subdural hematoma: ICD-10: Z86.79, Status: Active         Repeated falls: ICD-10: R29.6,  Status: Active         History of kidney stones: ICD-10: Z87.442, Status: Active         Back pain: ICD-10: M54.9, Status: Active         Scoliosis: ICD-10: M41.9, Status: Active         CIDP (chronic inflammatory demyelinating polyneuropathy):  ICD-10: G61.81, Status: Active       Surg History:         History of carpal tunnel release: ICD-10: Z98.890, Status:  Active         History of hernia surgery: ICD-10: Z98.890, Status: Active         History of surgery: ICD-10: Z98.890, Status: Active, Description:  [place]         History of craniotomy: ICD-10: Z98.890, Status: Active        Social History:   Social Substance History:  ·  Smoking Status never smoker (1)            Vitals and Measurements:   Vitals: Temp: 36.5  HR: 75  RR: 18  BP: 141/91  SPO2%:   98   Measurements: HT(cm): 173.4  WT(kg): 75.8  BSA: 1.91   BMI:  25.2   Last 3 Weights & Heights: Date:                           Weight/Scale Type:                    Height:   10-Aug-2023 10:26                75.8  kg                     173.4  cm  22-Feb-2023 10:53                76.5  kg                     173.4  cm  10-Feb-2023 10:24                76.8  kg                     173.4  cm         Physical Exam:      Constitutional: awake/alert/oriented x3.  Appears  chronically ill but in no acute distress.  Walks with a walker.      Eyes: PERRL, EOMI, clear sclera.      Head/Neck: Neck with normal movement.  No mass, adenopathy  or tenderness.  No JVD. Trachea midline.   Respiratory/Thorax: Thorax symmetric. Clear to auscultation.   No wheezes, rales, rhonchi.  Mediport in place right chest.   Cardiovascular: Regular, rate and rhythm. No murmur.   No rubs or gallops.   Gastrointestinal: Postsurgical changes noted.  Ureterostomy.   Nondistended.  Normal bowel sounds.  Soft, non-tender. No rebound or guarding.    No palpable hepatomegaly,      Musculoskeletal: ROM relatively intact.      Extremities: Unremarkable extremities; no cyanosis,       Neurological: Alert and oriented x3.      Lymphatic: No palpably significant lymphadenopathy  in the neck, supraclavicular or other areas.   Psychological: Appropriate mood and behavior.   Skin: Warm and dry, no rashes, no suspicious lesions.   Few resolving ecchymoses from venipunctures.         Lab Results:        0 d ago  (8/2/24) 1 mo ago  (7/12/24) 1 mo ago  (6/21/24) 2 mo ago  (5/31/24) 3 mo ago  (5/10/24) 3 mo ago  (4/19/24) 4 mo ago  (3/29/24)    Glucose  74 - 99 mg/dL 73 Low  82 93 89 80 86 84   Sodium  136 - 145 mmol/L 138 135 Low  136 137 137 136 137   Potassium  3.5 - 5.3 mmol/L 4.2 4.2 4.1 4.0 4.2 4.2 4.3   Chloride  98 - 107 mmol/L 104 101 102 103 103 100 101   Bicarbonate  21 - 32 mmol/L 29 27 26 29 29 29 29   Anion Gap  10 - 20 mmol/L 9 Low  11 R 12 9 Low  9 Low  11 11   Urea Nitrogen  6 - 23 mg/dL 15 14 12 12 17 13 11   Creatinine  0.50 - 1.30 mg/dL 0.81 0.78 0.81 0.74 0.70 0.74 0.76   eGFR  >60 mL/min/1.73m*2 >90 >90 CM >90 CM >90 CM >90 CM >90 CM >90 CM   Comment: Calculations of estimated GFR are performed using the 2021 CKD-EPI Study Refit equation without the race variable for the IDMS-Traceable creatinine methods.  https://jasn.asnjournals.org/content/early/2021/09/22/ASN.2411551274   Calcium  8.6 - 10.3 mg/dL 9.0 9.1 9.0 9.0 8.9 8.9 9.1   Albumin  3.4 - 5.0 g/dL 3.8 3.8 3.8 3.9 3.8 3.9 3.9   Alkaline Phosphatase  33 - 136 U/L 67 59 58 58 61 65 67   Total Protein  6.4 - 8.2 g/dL 7.2 7.1 7.0 7.0 7.1 7.2 7.0   AST  9 - 39 U/L 16 18 18 17 19 18 20   Bilirubin, Total  0.0 - 1.2 mg/dL 0.5 0.6 0.6 0.5 0.5 0.7 0.5   ALT  10 - 52 U/L 5 Low              10 d ago  (8/2/24) 1 mo ago  (7/12/24) 1 mo ago  (6/21/24) 2 mo ago  (5/31/24) 3 mo ago  (5/10/24) 3 mo ago  (4/19/24) 4 mo ago  (3/29/24)    WBC  4.4 - 11.3 x10*3/uL 4.7 4.2 Low  4.2 Low  6.1 5.2 4.7 3.9 Low    nRBC  0.0 - 0.0 /100 WBCs 0.0 0.0 0.0 0.0 0.0 0.0 0.0   RBC  4.50 - 5.90 x10*6/uL 3.92 Low  4.00 Low  4.03 Low  4.18 Low  4.07 Low  4.22 Low   4.12 Low    Hemoglobin  13.5 - 17.5 g/dL 12.8 Low  12.8 Low  12.9 Low  13.2 Low  13.0 Low  13.2 Low  13.2 Low    Hematocrit  41.0 - 52.0 % 37.7 Low  37.6 Low  38.0 Low  39.2 Low  38.5 Low  39.7 Low  38.5 Low    MCV  80 - 100 fL 96 94 94 94 95 94 93   MCH  26.0 - 34.0 pg 32.7 32.0 32.0 31.6 31.9 31.3 32.0   MCHC  32.0 - 36.0 g/dL 34.0 34.0 33.9 33.7 33.8 33.2 34.3   RDW  11.5 - 14.5 % 12.8 12.9 12.7 12.9 12.7 12.6 13.0   Platelets  150 - 450 x10*3/uL 138 Low             ·  Results               Assessment and Plan:      Assessment and Plan:   Assessment:    1. IgM MGUS, stable, w/o evidence of an associated lymphoproliferative disorder or myeloma.  M protein stable 0.2 g/dL.  K/L ratio slightly elevated.  No CRAB  signs.  CBC normal.      2.  CIDP. Given the negative anti-MAG Ab and nerve biopsy in the past, MGUS was not felt to be a cause for his polyneuropathy.      3.  History of prostate cancer, GG 3, T3a N0 M0, surveillance. s/p cystoprostatectomy 12/24/2020.  Followed by his urologist.     4.  History of bladder carcinoma in situ, status post resection December 2020.     5.  History left lung cancer stage I, diagnosed February 2022, status post SBRT.       6.  Subcentimeter pulmonary nodules, likely due to inflammatory disease, radiation change, rule out malignancy.     7.  Concern for aspiration.  Work-up in progress.      Plan: MGUS seem to be stable, hemoglobin is stable no evidence of hypercalcemia.  Lab result discussed with the patient.  Will need repeat labs after 6-month and follow-up after 6 months, patient mild thrombocytopenia which has been stable     > 3 0 minutes spent discussing laboratory, radiologic and pathologic findings, the natural history of the disease and treatment as well as documentation of the visit.

## 2024-08-12 NOTE — PATIENT INSTRUCTIONS
Follow up visit with Dr. Giraldo for history of prostate cancer diagnosed in 2020, non small cell lung cancer diagnosed in 2021 and IgM monoclonal gammopathy.     Return for follow up with Dr. Giraldo in 6 months.

## 2024-08-13 ENCOUNTER — APPOINTMENT (OUTPATIENT)
Dept: HEMATOLOGY/ONCOLOGY | Facility: CLINIC | Age: 79
End: 2024-08-13
Payer: MEDICARE

## 2024-08-13 LAB
KAPPA LC SERPL-MCNC: 5.44 MG/DL (ref 0.33–1.94)
KAPPA LC/LAMBDA SER: 5.33 {RATIO} (ref 0.26–1.65)
LAMBDA LC SERPL-MCNC: 1.02 MG/DL (ref 0.57–2.63)

## 2024-08-14 LAB
ALBUMIN: 4 G/DL (ref 3.4–5)
ALPHA 1 GLOBULIN: 0.3 G/DL (ref 0.2–0.6)
ALPHA 2 GLOBULIN: 0.7 G/DL (ref 0.4–1.1)
BETA GLOBULIN: 1.2 G/DL (ref 0.5–1.2)
GAMMA GLOBULIN: 2.2 G/DL (ref 0.5–1.4)
IMMUNOFIXATION COMMENT: ABNORMAL
M-PROTEIN 1: 0.4 G/DL
PATH REVIEW - SERUM IMMUNOFIXATION: ABNORMAL
PATH REVIEW-SERUM PROTEIN ELECTROPHORESIS: ABNORMAL
PROTEIN ELECTROPHORESIS COMMENT: ABNORMAL

## 2024-08-16 ENCOUNTER — APPOINTMENT (OUTPATIENT)
Dept: NEUROLOGY | Facility: CLINIC | Age: 79
End: 2024-08-16
Payer: MEDICARE

## 2024-08-16 DIAGNOSIS — G62.9 DEMYELINATING NEUROPATHY: ICD-10-CM

## 2024-08-16 DIAGNOSIS — G62.89: ICD-10-CM

## 2024-08-16 PROCEDURE — 99214 OFFICE O/P EST MOD 30 MIN: CPT | Performed by: PSYCHIATRY & NEUROLOGY

## 2024-08-16 PROCEDURE — 1157F ADVNC CARE PLAN IN RCRD: CPT | Performed by: PSYCHIATRY & NEUROLOGY

## 2024-08-16 NOTE — PROGRESS NOTES
2024    Neuromuscular Medicine Follow Up     Wade Montgomery, MRN: [id number], : 1945  Reason for Visit: No chief complaint on file.     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 Each year for the last few years, he has been without IVIG for many weeks due to insurance not approving his IVIG in a timely manner. His motor and sensory symptoms due to CIDP worsen without IVIG.    His symptoms of CIDP have improved with IVIG. He has not had any worsening with being on a stable dose of IVIG, 70 grams every three weeks.    Plan:  -- continue IVIG for CIDP diagnosis.     Dianne Robertson MD  Neuromuscular Neurology  [facility]  Office Phone Number: [phone]        History of Present Illness:    Mr. Montgomery is a 79 y.o.  male with CIDP who presents for follow up. He is accompanied by his wife.     He has been receiving his IVIG. He worsens in terms of strength when not receiving his IVIG. His sensory symptoms and strength feel stable to him.    For Parkinson's, he is taking Sinemet 1.5 tablets three times a day.  With walker has difficulty with turning. Had a fall on carpet 2 weeks ago. He feels tired with walking too much.    He has been doing exercises kicking out and some standing exercises as well.    Prior History:     Relevant past medical, surgical, family, and social histories, along with ROS was reviewed and pertinent details noted above.     Allergies   Allergen Reactions    Oxycodone Other and Hallucinations     Psychosis    Penicillin V Other      Medications:    Current Outpatient Medications:     acetaminophen (TYLENOL ORAL), if needed., Disp: , Rfl:     aspirin 81 mg EC tablet, Take 1 tablet (81 mg) by mouth once daily., Disp: , Rfl:     carbidopa-levodopa (Sinemet)  mg tablet, Take 1.5 tabs TID, Disp: 405 tablet, Rfl: 3    carboxymethylcellulose (Refresh Plus) 0.5 % ophthalmic  solution, Administer into affected eye(s) 4 times a day., Disp: , Rfl:     diclofenac sodium (Voltaren) 1 % gel gel, Apply 4.5 inches (4 g) topically 4 times a day.  APPLY TO LOWER EXTREMITIES. DO NOT APPLY MORE THAN 16 GM DAILY TO ANY ONE AFFECTED JOINT., Disp: , Rfl:     diphenhydramine HCl (BENADRYL ORAL), if needed., Disp: , Rfl:     docusate sodium (Colace) 100 mg capsule, Take 2 capsules (200 mg) by mouth once daily., Disp: , Rfl:     fluticasone (Flonase) 50 mcg/actuation nasal spray, Administer 2 sprays into each nostril if needed., Disp: , Rfl:     ibuprofen 600 mg tablet, Take 1 tablet (600 mg) by mouth every 6 hours if needed. States takes 200 mg, Disp: , Rfl:     immune globul G-gly-IgA avg 46 (Gamunex-C) 20 gram/200 mL (10 %) solution, Infuse 70 gm divided over 1 days every 3 weeks x 6 months. Premedicate with 650 mg acetaminophen PO, 25 mg diphenhydramine PO, and 100 mg hydrocortisone IV push., Disp: , Rfl:     ketoconazole (NIZOral) 2 % cream, Apply twice daily to affected areas of face, Disp: 60 g, Rfl: 11    ketoconazole (NIZOral) 2 % shampoo, Ketoconazole 2 % External Shampoo Quantity: 120  Refills: 0  Start: 15-Sep-2021, Disp: , Rfl:     melatonin 5 mg tablet, Take by mouth., Disp: , Rfl:     multivitamin tablet, Take 1 tablet by mouth once daily. Take with food, Disp: , Rfl:     polyethylene glycol (Miralax) 17 gram/dose powder, Take by mouth., Disp: , Rfl:     predniSONE (Deltasone) 5 mg tablet, Take 1 tablet (5 mg) by mouth once daily., Disp: 90 tablet, Rfl: 3    Restasis 0.05 % ophthalmic emulsion, Administer 1 drop into both eyes every 12 hours., Disp: 180 each, Rfl: 3    rosuvastatin (Crestor) 5 mg tablet, Take 1 tablet (5 mg) by mouth once daily., Disp: 90 tablet, Rfl: 3    sertraline (Zoloft) 25 mg tablet, Take 1 tablet (25 mg) by mouth once daily., Disp: 90 tablet, Rfl: 3       Physical Exam:   There were no vitals taken for this visit.     General: Stooped posture.     Cranial nerves:  Blinking frequency seems to be reduced. Facial expression decreased.     Motor:     R L   4 4 Shoulder abduction  5 5 Elbow flexion  5 5 Elbow extension  5 5 Wrist extension  5 5 Wrist flexion  4+ 4+ Finger flexion  4 4 Finger extension  5 4 Finger abduction  5- 5- Hip flexion  5 5 Knee flexion  5 5 Knee extension  5 5 Ankle dorsiflexion  5 5 Ankle plantarflexion     Sensory:  Intact to sharp in the distal upper and lower extremities, decreased pinprick of at least 50 percent in the left calf, 75 percent in the right calf     Gait:  Stands slowly. Uses walker and able to stand by himself and walk.

## 2024-08-19 ENCOUNTER — APPOINTMENT (OUTPATIENT)
Dept: NEUROLOGY | Facility: CLINIC | Age: 79
End: 2024-08-19
Payer: MEDICARE

## 2024-08-19 VITALS
DIASTOLIC BLOOD PRESSURE: 73 MMHG | HEART RATE: 88 BPM | SYSTOLIC BLOOD PRESSURE: 116 MMHG | BODY MASS INDEX: 24.13 KG/M2 | RESPIRATION RATE: 16 BRPM | WEIGHT: 162.92 LBS | HEIGHT: 69 IN

## 2024-08-19 DIAGNOSIS — G20.A2 PARKINSON'S DISEASE WITHOUT DYSKINESIA, WITH FLUCTUATING MANIFESTATIONS (MULTI): Primary | ICD-10-CM

## 2024-08-19 DIAGNOSIS — G20.C PRIMARY PARKINSONISM (MULTI): ICD-10-CM

## 2024-08-19 PROCEDURE — 1036F TOBACCO NON-USER: CPT | Performed by: PSYCHIATRY & NEUROLOGY

## 2024-08-19 PROCEDURE — 1157F ADVNC CARE PLAN IN RCRD: CPT | Performed by: PSYCHIATRY & NEUROLOGY

## 2024-08-19 PROCEDURE — 1160F RVW MEDS BY RX/DR IN RCRD: CPT | Performed by: PSYCHIATRY & NEUROLOGY

## 2024-08-19 PROCEDURE — 99214 OFFICE O/P EST MOD 30 MIN: CPT | Performed by: PSYCHIATRY & NEUROLOGY

## 2024-08-19 PROCEDURE — 1159F MED LIST DOCD IN RCRD: CPT | Performed by: PSYCHIATRY & NEUROLOGY

## 2024-08-19 RX ORDER — CARBIDOPA AND LEVODOPA 25; 100 MG/1; MG/1
TABLET ORAL
Qty: 450 TABLET | Refills: 3 | Status: SHIPPED | OUTPATIENT
Start: 2024-08-19

## 2024-08-19 NOTE — PATIENT INSTRUCTIONS
"It was a pleasure seeing you today.     Increase Sinemet 25-100mg 1.5 tabs at 8am / 1.5 tabs at 12pm / 1.5 tabs at 4pm and 1 tab at 8pm    Please make a follow up appointment in 5-6 months.  You may also schedule a phone or virtual visit sooner on a Friday morning with me as needed before the next clinic appointment.     For any urgent issues or needing to speak to a medical assistant please call [phone], option 6 during our office hours Monday-Friday 8am-4pm, and leave a voicemail with your concern.  My office will try to reach back you as soon as possible within 24 (business) hours.  If you have an emergency please call 911 or visit a local urgent care or nearest emergency room.      Please understand that [employer] is a useful communication tool for simple \"normal\" results or a refill request but I would not recommend using this tool for emergent or urgent issues or for conversations with me.  I am happy to ask my staff to rearrange a follow up visit or a virtual visit sooner than requested if appropriate for your care.    "

## 2024-08-19 NOTE — PROGRESS NOTES
Subjective     Wade Montgomery is a 79 y.o. year old male here for parkinsonism follow up.   Arrives with wife who provides more history.  Parkinson's Disease  Arrives with wife today.  He is falling a lot more often.  Uses a walker.   Qamar scan + abnormal and consistent with PD.  He fell 4 days ago at home trying to move furniture and his left leg got stuck on walker and fell forward.   No hallucinations.   He was on Sinemet 2 tabs TID (8 am /1pm / 6pm)  but had some hallucinations, shadow people. He is seeing GI specialist for constipation.   MRI Brain was normal.    Takes Sinemet 25-100mg 1.5 tab TID - no AE.   He has CIPD. IVIG treatments he goes to infusion center at [facility].   Also noticed fine motor movements are impaired, facial expressions are masked more. Voice is softer.       FH: no PD in family. father had neuropathy. spine issues in family. 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   PMH: scoliosis. neuropathy.      hx of craniotomy and SDH 2012 -secondary from acting out is dreams ( hit his night stand)     Review of Systems    Patient Active Problem List   Diagnosis    Bladder cancer (Multi)    Chronic inflammatory demyelinating polyneuropathy (Multi)    Coronary artery disease involving native coronary artery of native heart without angina pectoris    Prostate cancer (Multi)    Unstable gait    Lung cancer (Multi)    Abnormal ejaculation    Abnormal stress test    Acquired involutional ptosis of both eyelids    Arthritis of knee, right    Astigmatism    Hyperopia    Baker cyst    Basal cell carcinoma of skin of right upper limb, including shoulder    Basal cell carcinoma of skin of other parts of face    Bilateral impacted cerumen    Bilateral partial vocal cord paralysis    Blepharitis, left eye    Calf swelling    Carcinoma in situ of skin of scalp and neck    Choroidal nevus of left eye    Dermatochalasis    Disturbance of skin sensation    CHAUDHARI (dyspnea on  exertion)    Dysphagia    Eustachian tube dysfunction    Glossopharyngeal nerve disorder    Peripheral neuropathy    Glottic insufficiency    Ground glass opacity present on imaging of lung    Hemangioma of skin and subcutaneous tissue    Hematuria    Hemodynamic instability    History of bladder cancer    Personal history of other malignant neoplasm of skin    Hoarseness of voice    Hypovolemia    IgM monoclonal gammopathy of uncertain significance    MGUS (monoclonal gammopathy of unknown significance)    Kappa light chain disease (Multi)    Left inguinal hernia    Lichen simplex chronicus    Lung nodule, multiple    Malignant neoplasm of lower lobe of left lung (Multi)    Melanocytic nevi of scalp and neck    Melanocytic nevi of trunk    Melanocytic nevi of unspecified lower limb, including hip    Melanocytic nevi of unspecified upper limb, including shoulder    Melanocytic nevi of other parts of face    Memory changes    Mixed hyperlipidemia    Movement disorder    Neoplasm of uncertain behavior of skin    Actinic keratosis    Inflamed seborrheic keratosis    Other seborrheic keratosis    Skin changes due to chronic exposure to nonionizing radiation, unspecified    Parkinsonism (Multi)    PCO (posterior capsular opacification), right    Pseudophakia    Scar condition and fibrosis of skin    Seborrheic dermatitis, unspecified    Sensorineural hearing loss, bilateral    Spinal stenosis of lumbar region at multiple levels    Spinal stenosis    SPK (superficial punctate keratitis)    Tongue fasciculation    Trigger finger, acquired    Trigger middle finger of right hand    Unspecified voice and resonance disorder    Unstable balance    Urinary retention    Weakness of both hands    Weakness of extremity    Xerosis cutis    Malignant neoplasm of overlapping sites of bladder (Multi)    Urothelial cancer (Multi)    SCC (squamous cell carcinoma)    Anemia    Chest pain    Chronic inflammatory demyelinating polyneuritis  (Multi)    Polyneuropathy    Erectile disorder    Monoclonal gammopathy    Idiopathic progressive neuropathy    Secondary malignant neoplasm of left lung (Multi)    Other specified disorders of adrenal gland (Multi)    Disease due to severe acute respiratory syndrome coronavirus 2 (SARS-CoV-2)    Constipation    Acute urinary tract infection    Eye tearing, bilateral    Abnormal findings on diagnostic imaging of lung    Keratoconjunctivitis sicca of both eyes not specified as Sjogren's    Family history of glaucoma    Hyperopia, bilateral    Presbyopia     Past Medical History:   Diagnosis Date    Acute pain in scrotum 03/18/2024    Bicipital tendinitis, left shoulder 02/27/2014    Biceps tendonitis on left    CIDP with CNS overlap (chronic inflammatory demyelinating polyneuritis) (Multi)     Contact with and (suspected) exposure to unspecified communicable disease 10/24/2019    Exposure to communicable disease    Dry eye syndrome of bilateral lacrimal glands     Dry eye syndrome of both eyes    Encounter for general adult medical examination without abnormal findings 08/19/2019    Initial Medicare annual wellness visit    Excessive tear production 03/18/2024    Finger injury 03/18/2024    History of subdural hematoma 03/18/2024    Nasal congestion 03/18/2024    Other chest pain 07/14/2015    Chest pain, muscular    Pain in left hip 04/20/2022    Left hip pain    Pain in left knee 04/20/2022    Left knee pain    Pain in scrotum 03/18/2024    Pain of right lower extremity 03/18/2024    Pelvic and perineal pain 08/19/2019    Suprapubic pressure    Personal history of other diseases of the circulatory system     History of subdural hematoma    Personal history of other diseases of the digestive system 05/10/2018    History of rectal bleeding    Personal history of other diseases of the musculoskeletal system and connective tissue 02/09/2022    History of low back pain    Personal history of other diseases of the  respiratory system 02/27/2014    History of acute bronchitis    Personal history of other diseases of urinary system     History of hematuria    Personal history of other drug therapy     History of influenza vaccination    Personal history of other infectious and parasitic diseases 09/09/2019    History of tinea cruris    Personal history of other infectious and parasitic diseases 03/30/2017    History of tinea cruris    Personal history of other specified conditions 09/09/2020    History of flank pain    Personal history of other specified conditions 03/03/2020    History of nasal congestion    Personal history of other specified conditions 02/14/2022    History of dysuria    Personal history of other specified conditions 12/10/2019    History of epigastric pain    Personal history of urinary calculi 07/24/2019    History of urinary stone    Rash and other nonspecific skin eruption 01/06/2017    Rash    Retention of urine, unspecified 11/25/2020    Urinary retention with incomplete bladder emptying    Shoulder pain 03/18/2024    Strain of muscle, fascia and tendon of lower back, initial encounter 07/14/2015    Lumbar strain    Tear film insufficiency 03/18/2024    Tinea cruris 03/18/2024    Trochanteric bursitis, left hip 09/22/2021    Greater trochanteric bursitis of left hip     Past Surgical History:   Procedure Laterality Date    CT GUIDED PERCUTANEOUS BIOPSY LUNG  2/9/2022    CT GUIDED PERCUTANEOUS BIOPSY LUNG 2/9/2022 [facility]    OTHER SURGICAL HISTORY  02/13/2020    Hernia repair    OTHER SURGICAL HISTORY  02/13/2020    Knee surgery    OTHER SURGICAL HISTORY  02/13/2020    Trigger finger repair    OTHER SURGICAL HISTORY  01/13/2020    Craniotomy    OTHER SURGICAL HISTORY  01/13/2020    Carpal tunnel surgery    OTHER SURGICAL HISTORY  10/27/2020    Sinus surgery    OTHER SURGICAL HISTORY  02/16/2022    Cataract surgery    OTHER SURGICAL HISTORY  01/26/2021    Nerve biospy     Social History     Tobacco  Use    Smoking status: Never     Passive exposure: Past    Smokeless tobacco: Never   Substance Use Topics    Alcohol use: Yes     Comment: rare     family history includes Dementia in his maternal grandmother and mother; Diabetes type I in his son; Heart attack in his father; Liver cancer in his father; Macular degeneration in his father; Scoliosis in his mother; peripheral neuropathy in his father.    Current Outpatient Medications:     acetaminophen (TYLENOL ORAL), if needed., Disp: , Rfl:     aspirin 81 mg EC tablet, Take 1 tablet (81 mg) by mouth once daily., Disp: , Rfl:     carbidopa-levodopa (Sinemet)  mg tablet, Take 1.5 tabs TID, Disp: 405 tablet, Rfl: 3    carboxymethylcellulose (Refresh Plus) 0.5 % ophthalmic solution, Administer into affected eye(s) 4 times a day., Disp: , Rfl:     diclofenac sodium (Voltaren) 1 % gel gel, Apply 4.5 inches (4 g) topically 4 times a day.  APPLY TO LOWER EXTREMITIES. DO NOT APPLY MORE THAN 16 GM DAILY TO ANY ONE AFFECTED JOINT., Disp: , Rfl:     diphenhydramine HCl (BENADRYL ORAL), if needed., Disp: , Rfl:     docusate sodium (Colace) 100 mg capsule, Take 2 capsules (200 mg) by mouth once daily., Disp: , Rfl:     fluticasone (Flonase) 50 mcg/actuation nasal spray, Administer 2 sprays into each nostril if needed., Disp: , Rfl:     ibuprofen 600 mg tablet, Take 1 tablet (600 mg) by mouth every 6 hours if needed. States takes 200 mg, Disp: , Rfl:     immune globul G-gly-IgA avg 46 (Gamunex-C) 20 gram/200 mL (10 %) solution, Infuse 70 gm divided over 1 days every 3 weeks x 6 months. Premedicate with 650 mg acetaminophen PO, 25 mg diphenhydramine PO, and 100 mg hydrocortisone IV push., Disp: , Rfl:     ketoconazole (NIZOral) 2 % cream, Apply twice daily to affected areas of face, Disp: 60 g, Rfl: 11    ketoconazole (NIZOral) 2 % shampoo, Ketoconazole 2 % External Shampoo Quantity: 120  Refills: 0  Start: 15-Sep-2021, Disp: , Rfl:     melatonin 5 mg tablet, Take by  mouth., Disp: , Rfl:     multivitamin tablet, Take 1 tablet by mouth once daily. Take with food, Disp: , Rfl:     polyethylene glycol (Miralax) 17 gram/dose powder, Take by mouth., Disp: , Rfl:     predniSONE (Deltasone) 5 mg tablet, Take 1 tablet (5 mg) by mouth once daily., Disp: 90 tablet, Rfl: 3    Restasis 0.05 % ophthalmic emulsion, Administer 1 drop into both eyes every 12 hours., Disp: 180 each, Rfl: 3    rosuvastatin (Crestor) 5 mg tablet, Take 1 tablet (5 mg) by mouth once daily., Disp: 90 tablet, Rfl: 3    sertraline (Zoloft) 25 mg tablet, Take 1 tablet (25 mg) by mouth once daily., Disp: 90 tablet, Rfl: 3  Allergies   Allergen Reactions    Oxycodone Other and Hallucinations     Psychosis    Penicillin V Other     There were no vitals taken for this visit.  Neurological Exam/Physical Exam:    Constitutional: General appearance: no acute distress. Pleasant.   Auscultation of Heart: Regular rate and rhythm, no murmurs, normal S1 and S2.   Carotid Arteries: Intact without any bruits   Peripheral Vascular Exam: Pulses +2 and equal in all extremities. No swelling, edema or tenderness to palpations   Mental status: The patient was in no distress, alert, interactive and cooperative. Affect is appropriate.   Orientation: oriented to person, oriented to place and oriented to time.   Memory: recent memory intact   Soft voice.   Fund of knowledge: Patient displays adequate knowledge of current events  Eyes: The ophthalmoscopic examination was normal.   Cranial nerve II: Visual fields full to confrontation.   Cranial nerves III, IV, and VI: Pupils round, equally reactive to light; no ptosis. EOMs intact. No nystagmus.   Cranial Nerve V: Facial sensation intact to LT bilaterally.   Cranial nerve VII: Normal and symmetric facial strength.   Cranial nerve VIII: Hearing is intact bilaterally to finger rub.  Cranial nerves IX and X: Palate elevates symmetrically.   Cranial nerve XI: Shoulder shrug and neck rotation  strength are intact.   Cranial nerve XII: Tongue is midline.  Motor:   Strength is slightly decreased in hands, has flexion of some fingers, duputeryen contractures of R and L 2nd and 3rd digits.   Deep Tendon Reflexes: left biceps 2+ , right biceps 2+, left triceps 2+, right triceps 2+, left brachioradialis 2+, right brachioradialis 2+, left patella 2+, right patella 2+, left ankle jerk 1+, right ankle jerk 1+   Plantar Reflex: Toes downgoing to plantar stimulation on the left. Toes downgoing to plantar stimulation on the right.   Sensory Exam: decreased vibration and LT in both legs up to knees b/l.  Coordination: impaired.   Gait:  uses walker. Slow . Scoliosis . Left leg slightly slower. Very difficult to get up from a chair.        Labs:    CBC:   Lab Results   Component Value Date    WBC 4.7 08/02/2024    HGB 12.8 (L) 08/02/2024    HCT 37.7 (L) 08/02/2024     (L) 08/02/2024     BMP:   Lab Results   Component Value Date     08/02/2024    K 4.2 08/02/2024     08/02/2024    CO2 29 08/02/2024    BUN 15 08/02/2024    CREATININE 0.81 08/02/2024    CALCIUM 9.0 08/02/2024    MG 2.01 12/28/2020    PHOS 2.4 (L) 12/28/2020     LFT:   Lab Results   Component Value Date    ALKPHOS 67 08/02/2024    BILITOT 0.5 08/02/2024    BILIDIR 0.3 12/28/2020    PROT 8.4 (H) 08/12/2024    ALBUMIN 3.8 08/02/2024    ALT 5 (L) 08/02/2024    AST 16 08/02/2024         Assessment/Plan   Problem List Items Addressed This Visit             ICD-10-CM    Parkinsonism (Multi) - Primary G20.C    atypical features. 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     Trial slightly higher dose of sinemet- 1.5 tabs at 8am / 1.5 tabs at 12pm / 1.5 tabs at 4pm and 1 tab at 8pm  PT for walking.   CIPD neuropathy, hx of brain bleed as well.   This is a chronic neurologic condition that requires ongoing care and monitoring. This is a complex, serious condition that needs long term care going forward. Between myself and the  patient we will be changing direction of care depending on responses to treatment.   Today we discussed medication options, non medication options for management and various other symptoms that are in relation to this disease.  I will continue to be involved in the care of this patient.

## 2024-08-23 ENCOUNTER — INFUSION (OUTPATIENT)
Dept: INFUSION THERAPY | Facility: HOSPITAL | Age: 79
End: 2024-08-23
Payer: MEDICARE

## 2024-08-23 VITALS
DIASTOLIC BLOOD PRESSURE: 91 MMHG | SYSTOLIC BLOOD PRESSURE: 155 MMHG | HEART RATE: 73 BPM | OXYGEN SATURATION: 98 % | TEMPERATURE: 98.6 F | RESPIRATION RATE: 18 BRPM

## 2024-08-23 DIAGNOSIS — G61.81 CHRONIC INFLAMMATORY DEMYELINATING POLYNEURITIS (MULTI): ICD-10-CM

## 2024-08-23 LAB
ALBUMIN SERPL BCP-MCNC: 3.8 G/DL (ref 3.4–5)
ALP SERPL-CCNC: 60 U/L (ref 33–136)
ALT SERPL W P-5'-P-CCNC: 4 U/L (ref 10–52)
ANION GAP SERPL CALC-SCNC: 10 MMOL/L (ref 10–20)
AST SERPL W P-5'-P-CCNC: 17 U/L (ref 9–39)
BASOPHILS # BLD AUTO: 0.01 X10*3/UL (ref 0–0.1)
BASOPHILS NFR BLD AUTO: 0.2 %
BILIRUB SERPL-MCNC: 0.4 MG/DL (ref 0–1.2)
BUN SERPL-MCNC: 13 MG/DL (ref 6–23)
CALCIUM SERPL-MCNC: 9.5 MG/DL (ref 8.6–10.3)
CHLORIDE SERPL-SCNC: 101 MMOL/L (ref 98–107)
CO2 SERPL-SCNC: 29 MMOL/L (ref 21–32)
CREAT SERPL-MCNC: 0.78 MG/DL (ref 0.5–1.3)
EGFRCR SERPLBLD CKD-EPI 2021: >90 ML/MIN/1.73M*2
EOSINOPHIL # BLD AUTO: 0.02 X10*3/UL (ref 0–0.4)
EOSINOPHIL NFR BLD AUTO: 0.4 %
ERYTHROCYTE [DISTWIDTH] IN BLOOD BY AUTOMATED COUNT: 12.6 % (ref 11.5–14.5)
GLUCOSE SERPL-MCNC: 89 MG/DL (ref 74–99)
HCT VFR BLD AUTO: 38.2 % (ref 41–52)
HGB BLD-MCNC: 12.8 G/DL (ref 13.5–17.5)
IMM GRANULOCYTES # BLD AUTO: 0.01 X10*3/UL (ref 0–0.5)
IMM GRANULOCYTES NFR BLD AUTO: 0.2 % (ref 0–0.9)
LYMPHOCYTES # BLD AUTO: 1.39 X10*3/UL (ref 0.8–3)
LYMPHOCYTES NFR BLD AUTO: 25.7 %
MCH RBC QN AUTO: 31.9 PG (ref 26–34)
MCHC RBC AUTO-ENTMCNC: 33.5 G/DL (ref 32–36)
MCV RBC AUTO: 95 FL (ref 80–100)
MONOCYTES # BLD AUTO: 0.52 X10*3/UL (ref 0.05–0.8)
MONOCYTES NFR BLD AUTO: 9.6 %
NEUTROPHILS # BLD AUTO: 3.45 X10*3/UL (ref 1.6–5.5)
NEUTROPHILS NFR BLD AUTO: 63.9 %
NRBC BLD-RTO: 0 /100 WBCS (ref 0–0)
PLATELET # BLD AUTO: 150 X10*3/UL (ref 150–450)
POTASSIUM SERPL-SCNC: 4.1 MMOL/L (ref 3.5–5.3)
PROT SERPL-MCNC: 7.2 G/DL (ref 6.4–8.2)
RBC # BLD AUTO: 4.01 X10*6/UL (ref 4.5–5.9)
SODIUM SERPL-SCNC: 136 MMOL/L (ref 136–145)
WBC # BLD AUTO: 5.4 X10*3/UL (ref 4.4–11.3)

## 2024-08-23 PROCEDURE — 85025 COMPLETE CBC W/AUTO DIFF WBC: CPT

## 2024-08-23 PROCEDURE — 2500000004 HC RX 250 GENERAL PHARMACY W/ HCPCS (ALT 636 FOR OP/ED): Performed by: PSYCHIATRY & NEUROLOGY

## 2024-08-23 PROCEDURE — 84075 ASSAY ALKALINE PHOSPHATASE: CPT

## 2024-08-23 PROCEDURE — 2500000001 HC RX 250 WO HCPCS SELF ADMINISTERED DRUGS (ALT 637 FOR MEDICARE OP): Performed by: PSYCHIATRY & NEUROLOGY

## 2024-08-23 PROCEDURE — 96375 TX/PRO/DX INJ NEW DRUG ADDON: CPT | Mod: INF

## 2024-08-23 PROCEDURE — 96366 THER/PROPH/DIAG IV INF ADDON: CPT | Mod: INF

## 2024-08-23 PROCEDURE — 96365 THER/PROPH/DIAG IV INF INIT: CPT | Mod: INF

## 2024-08-23 RX ORDER — HEPARIN 100 UNIT/ML
500 SYRINGE INTRAVENOUS AS NEEDED
Status: DISCONTINUED | OUTPATIENT
Start: 2024-08-23 | End: 2024-08-23 | Stop reason: HOSPADM

## 2024-08-23 RX ORDER — HEPARIN SODIUM,PORCINE/PF 10 UNIT/ML
50 SYRINGE (ML) INTRAVENOUS AS NEEDED
OUTPATIENT
Start: 2024-08-23

## 2024-08-23 RX ORDER — HEPARIN 100 UNIT/ML
500 SYRINGE INTRAVENOUS AS NEEDED
OUTPATIENT
Start: 2024-08-23

## 2024-08-23 RX ORDER — DIPHENHYDRAMINE HCL 25 MG
25 CAPSULE ORAL ONCE
Status: COMPLETED | OUTPATIENT
Start: 2024-08-23 | End: 2024-08-23

## 2024-08-23 RX ORDER — ACETAMINOPHEN 325 MG/1
650 TABLET ORAL ONCE
Status: COMPLETED | OUTPATIENT
Start: 2024-08-23 | End: 2024-08-23

## 2024-08-23 RX ORDER — ACETAMINOPHEN 325 MG/1
650 TABLET ORAL ONCE
OUTPATIENT
Start: 2024-09-13 | End: 2024-09-13

## 2024-08-23 RX ORDER — DIPHENHYDRAMINE HCL 25 MG
25 CAPSULE ORAL ONCE
OUTPATIENT
Start: 2024-09-13 | End: 2024-09-13

## 2024-08-23 ASSESSMENT — ENCOUNTER SYMPTOMS
LOSS OF SENSATION IN FEET: 1
DEPRESSION: 0
OCCASIONAL FEELINGS OF UNSTEADINESS: 1

## 2024-08-23 NOTE — PROGRESS NOTES
Patient has tolerated infusion with no signs or symptoms of infusion reaction. Patient's Vital signs are stable and the MP needle is discontinued. The site is WDL. Patient has no further concerns or questions. Patient is discharged home.

## 2024-09-02 ENCOUNTER — HOSPITAL ENCOUNTER (EMERGENCY)
Facility: HOSPITAL | Age: 79
Discharge: HOME | End: 2024-09-02
Attending: STUDENT IN AN ORGANIZED HEALTH CARE EDUCATION/TRAINING PROGRAM
Payer: MEDICARE

## 2024-09-02 ENCOUNTER — APPOINTMENT (OUTPATIENT)
Dept: RADIOLOGY | Facility: HOSPITAL | Age: 79
End: 2024-09-02
Payer: MEDICARE

## 2024-09-02 VITALS
OXYGEN SATURATION: 96 % | BODY MASS INDEX: 24.29 KG/M2 | HEART RATE: 74 BPM | SYSTOLIC BLOOD PRESSURE: 145 MMHG | DIASTOLIC BLOOD PRESSURE: 96 MMHG | WEIGHT: 164 LBS | HEIGHT: 69 IN | RESPIRATION RATE: 19 BRPM | TEMPERATURE: 97.7 F

## 2024-09-02 DIAGNOSIS — J18.9 PNEUMONIA OF LEFT LOWER LOBE DUE TO INFECTIOUS ORGANISM: ICD-10-CM
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
## 2024-09-02 LAB
ALBUMIN SERPL BCP-MCNC: 3.9 G/DL (ref 3.4–5)
ALP SERPL-CCNC: 64 U/L (ref 33–136)
ALT SERPL W P-5'-P-CCNC: <3 U/L (ref 10–52)
AMMONIA PLAS-SCNC: <10 UMOL/L (ref 16–53)
ANION GAP SERPL CALC-SCNC: 10 MMOL/L (ref 10–20)
APPEARANCE UR: CLEAR
AST SERPL W P-5'-P-CCNC: 19 U/L (ref 9–39)
BACTERIA #/AREA URNS AUTO: ABNORMAL /HPF
BILIRUB SERPL-MCNC: 0.6 MG/DL (ref 0–1.2)
BILIRUB UR STRIP.AUTO-MCNC: NEGATIVE MG/DL
BUN SERPL-MCNC: 12 MG/DL (ref 6–23)
CALCIUM SERPL-MCNC: 9.4 MG/DL (ref 8.6–10.3)
CARDIAC TROPONIN I PNL SERPL HS: 30 NG/L (ref 0–20)
CARDIAC TROPONIN I PNL SERPL HS: 31 NG/L (ref 0–20)
CHLORIDE SERPL-SCNC: 101 MMOL/L (ref 98–107)
CO2 SERPL-SCNC: 27 MMOL/L (ref 21–32)
COLOR UR: COLORLESS
CREAT SERPL-MCNC: 0.78 MG/DL (ref 0.5–1.3)
EGFRCR SERPLBLD CKD-EPI 2021: >90 ML/MIN/1.73M*2
ERYTHROCYTE [DISTWIDTH] IN BLOOD BY AUTOMATED COUNT: 12.5 % (ref 11.5–14.5)
GLUCOSE SERPL-MCNC: 92 MG/DL (ref 74–99)
GLUCOSE UR STRIP.AUTO-MCNC: NORMAL MG/DL
HCT VFR BLD AUTO: 38.4 % (ref 41–52)
HGB BLD-MCNC: 13.2 G/DL (ref 13.5–17.5)
KETONES UR STRIP.AUTO-MCNC: NEGATIVE MG/DL
LEUKOCYTE ESTERASE UR QL STRIP.AUTO: NEGATIVE
MCH RBC QN AUTO: 32.2 PG (ref 26–34)
MCHC RBC AUTO-ENTMCNC: 34.4 G/DL (ref 32–36)
MCV RBC AUTO: 94 FL (ref 80–100)
NITRITE UR QL STRIP.AUTO: NEGATIVE
NRBC BLD-RTO: 0 /100 WBCS (ref 0–0)
PH UR STRIP.AUTO: 7 [PH]
PLATELET # BLD AUTO: 151 X10*3/UL (ref 150–450)
POTASSIUM SERPL-SCNC: 4.1 MMOL/L (ref 3.5–5.3)
PROT SERPL-MCNC: 8 G/DL (ref 6.4–8.2)
PROT UR STRIP.AUTO-MCNC: NEGATIVE MG/DL
RBC # BLD AUTO: 4.1 X10*6/UL (ref 4.5–5.9)
RBC # UR STRIP.AUTO: ABNORMAL /UL
RBC #/AREA URNS AUTO: ABNORMAL /HPF
SARS-COV-2 RNA RESP QL NAA+PROBE: NOT DETECTED
SODIUM SERPL-SCNC: 134 MMOL/L (ref 136–145)
SP GR UR STRIP.AUTO: 1
UROBILINOGEN UR STRIP.AUTO-MCNC: NORMAL MG/DL
WBC # BLD AUTO: 6.3 X10*3/UL (ref 4.4–11.3)
WBC #/AREA URNS AUTO: ABNORMAL /HPF
WBC CLUMPS #/AREA URNS AUTO: ABNORMAL /HPF

## 2024-09-02 PROCEDURE — 72128 CT CHEST SPINE W/O DYE: CPT | Mod: RSC

## 2024-09-02 PROCEDURE — 84484 ASSAY OF TROPONIN QUANT: CPT | Performed by: STUDENT IN AN ORGANIZED HEALTH CARE EDUCATION/TRAINING PROGRAM

## 2024-09-02 PROCEDURE — 81001 URINALYSIS AUTO W/SCOPE: CPT | Performed by: STUDENT IN AN ORGANIZED HEALTH CARE EDUCATION/TRAINING PROGRAM

## 2024-09-02 PROCEDURE — 85027 COMPLETE CBC AUTOMATED: CPT | Performed by: STUDENT IN AN ORGANIZED HEALTH CARE EDUCATION/TRAINING PROGRAM

## 2024-09-02 PROCEDURE — 99285 EMERGENCY DEPT VISIT HI MDM: CPT

## 2024-09-02 PROCEDURE — 72131 CT LUMBAR SPINE W/O DYE: CPT | Mod: RSC | Performed by: RADIOLOGY

## 2024-09-02 PROCEDURE — 93005 ELECTROCARDIOGRAM TRACING: CPT

## 2024-09-02 PROCEDURE — 2500000004 HC RX 250 GENERAL PHARMACY W/ HCPCS (ALT 636 FOR OP/ED): Performed by: STUDENT IN AN ORGANIZED HEALTH CARE EDUCATION/TRAINING PROGRAM

## 2024-09-02 PROCEDURE — 70450 CT HEAD/BRAIN W/O DYE: CPT

## 2024-09-02 PROCEDURE — 70450 CT HEAD/BRAIN W/O DYE: CPT | Performed by: RADIOLOGY

## 2024-09-02 PROCEDURE — 72128 CT CHEST SPINE W/O DYE: CPT | Mod: RSC | Performed by: RADIOLOGY

## 2024-09-02 PROCEDURE — 80053 COMPREHEN METABOLIC PANEL: CPT | Performed by: STUDENT IN AN ORGANIZED HEALTH CARE EDUCATION/TRAINING PROGRAM

## 2024-09-02 PROCEDURE — 71250 CT THORAX DX C-: CPT | Mod: RSC | Performed by: RADIOLOGY

## 2024-09-02 PROCEDURE — 74176 CT ABD & PELVIS W/O CONTRAST: CPT

## 2024-09-02 PROCEDURE — 72131 CT LUMBAR SPINE W/O DYE: CPT | Mod: RSC

## 2024-09-02 PROCEDURE — 72125 CT NECK SPINE W/O DYE: CPT

## 2024-09-02 PROCEDURE — 87635 SARS-COV-2 COVID-19 AMP PRB: CPT | Performed by: STUDENT IN AN ORGANIZED HEALTH CARE EDUCATION/TRAINING PROGRAM

## 2024-09-02 PROCEDURE — 74176 CT ABD & PELVIS W/O CONTRAST: CPT | Mod: RSC | Performed by: RADIOLOGY

## 2024-09-02 PROCEDURE — 82140 ASSAY OF AMMONIA: CPT | Performed by: STUDENT IN AN ORGANIZED HEALTH CARE EDUCATION/TRAINING PROGRAM

## 2024-09-02 PROCEDURE — 72125 CT NECK SPINE W/O DYE: CPT | Performed by: RADIOLOGY

## 2024-09-02 PROCEDURE — 2500000001 HC RX 250 WO HCPCS SELF ADMINISTERED DRUGS (ALT 637 FOR MEDICARE OP): Performed by: STUDENT IN AN ORGANIZED HEALTH CARE EDUCATION/TRAINING PROGRAM

## 2024-09-02 RX ORDER — HEPARIN 100 UNIT/ML
5 SYRINGE INTRAVENOUS ONCE
Status: COMPLETED | OUTPATIENT
Start: 2024-09-02 | End: 2024-09-02

## 2024-09-02 RX ORDER — LEVOFLOXACIN 500 MG/1
500 TABLET, FILM COATED ORAL ONCE
Status: COMPLETED | OUTPATIENT
Start: 2024-09-02 | End: 2024-09-02

## 2024-09-02 RX ORDER — LEVOFLOXACIN 500 MG/1
500 TABLET, FILM COATED ORAL DAILY
Qty: 7 TABLET | Refills: 0 | Status: SHIPPED | OUTPATIENT
Start: 2024-09-02 | End: 2024-09-09

## 2024-09-02 RX ADMIN — LEVOFLOXACIN 500 MG: 500 TABLET, FILM COATED ORAL at 22:59

## 2024-09-02 RX ADMIN — Medication 500 UNITS: at 22:59

## 2024-09-02 ASSESSMENT — PAIN - FUNCTIONAL ASSESSMENT
PAIN_FUNCTIONAL_ASSESSMENT: 0-10

## 2024-09-02 ASSESSMENT — ENCOUNTER SYMPTOMS
BACK PAIN: 1
CONFUSION: 1
HEADACHES: 0
NUMBNESS: 0
WEAKNESS: 0
DYSURIA: 0
FEVER: 0
RESPIRATORY NEGATIVE: 1
CARDIOVASCULAR NEGATIVE: 1
GASTROINTESTINAL NEGATIVE: 1
DIZZINESS: 0
LIGHT-HEADEDNESS: 0

## 2024-09-02 ASSESSMENT — LIFESTYLE VARIABLES
TOTAL SCORE: 0
EVER FELT BAD OR GUILTY ABOUT YOUR DRINKING: NO
HAVE YOU EVER FELT YOU SHOULD CUT DOWN ON YOUR DRINKING: NO
HAVE PEOPLE ANNOYED YOU BY CRITICIZING YOUR DRINKING: NO
EVER HAD A DRINK FIRST THING IN THE MORNING TO STEADY YOUR NERVES TO GET RID OF A HANGOVER: NO

## 2024-09-02 ASSESSMENT — PAIN SCALES - GENERAL
PAINLEVEL_OUTOF10: 0 - NO PAIN
PAINLEVEL_OUTOF10: 3
PAINLEVEL_OUTOF10: 3

## 2024-09-02 NOTE — ED PROVIDER NOTES
[facility] EMERGENCY DEPARTMENT ENCOUNTER    Pt Name:Wade Montgomery  MRN: [id number]  Birthdate 1945  Date of evaluation: 09/03/24  PCP:  Ileana Bro DO    CHIEF COMPLAINT       Chief Complaint   Patient presents with    Fall     Lost balance and fell, hx parkinsons, pt states he doesn't think he had any loc but unsure, pt was awake when pt wife came into room, +confusion per family which is new today after fall       HISTORY OF PRESENT ILLNESS  (Location/Symptom, Timing/Onset, Context/Setting, Quality, Duration, Modifying Factors, Severity.)      Wade Montgomery is a 79 y.o. male who presents with unwitnessed fall prior to arrival.  Patient has history of Parkinson's, is on carbidopa levodopa, states he had a increase in the dosage of his medication approximately 1 month prior and has been taking his medications recently.  He is amnestic to events surrounding the fall.  Wife states she found him on the ground, laying on his left side, reportedly with more confusion than at baseline.  Patient states he is having some lower back pain, which is usual for him and which he does not believe has significantly worsened since the fall, and chills.  He denies headache, changes in vision, lightheadedness, chest pain, shortness of breath, cough, abdominal pain, nausea, vomiting, diarrhea, fevers, numbness, tingling, weakness.    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), Contact with and (suspected) exposure to unspecified communicable disease (10/24/2019), Dry eye syndrome of bilateral lacrimal glands, Encounter for general adult medical examination without abnormal findings (08/19/2019), Excessive tear production (03/18/2024), Finger injury (03/18/2024), History of subdural hematoma (03/18/2024), Nasal  congestion (03/18/2024), Other chest pain (07/14/2015), Pain in left hip (04/20/2022), Pain in left knee (04/20/2022), Pain in scrotum (03/18/2024), Pain of right lower extremity (03/18/2024), Pelvic and perineal pain (08/19/2019), Personal history of other diseases of the circulatory system, Personal history of other diseases of the digestive system (05/10/2018), Personal history of other diseases of the musculoskeletal system and connective tissue (02/09/2022), Personal history of other diseases of the respiratory system (02/27/2014), Personal history of other diseases of urinary system, Personal history of other drug therapy, Personal history of other infectious and parasitic diseases (09/09/2019), Personal history of other infectious and parasitic diseases (03/30/2017), Personal history of other specified conditions (09/09/2020), Personal history of other specified conditions (03/03/2020), Personal history of other specified conditions (02/14/2022), Personal history of other specified conditions (12/10/2019), Personal history of urinary calculi (07/24/2019), Rash and other nonspecific skin eruption (01/06/2017), Retention of urine, unspecified (11/25/2020), Shoulder pain (03/18/2024), Strain of muscle, fascia and tendon of lower back, initial encounter (07/14/2015), Tear film insufficiency (03/18/2024), Tinea cruris (03/18/2024), and Trochanteric bursitis, left hip (09/22/2021).       has a past surgical history that includes Other surgical history (02/13/2020); Other surgical history (02/13/2020); Other surgical history (02/13/2020); Other surgical history (01/13/2020); Other surgical history (01/13/2020); Other surgical history (10/27/2020); Other surgical history (02/16/2022); Other surgical history (01/26/2021); and CT guided percutaneous biopsy lung (2/9/2022).      Social History     Socioeconomic History    Marital status:      Spouse name: Not on file    Number of children: Not on file    Years of  education: Not on file    Highest education level: Not on file   Occupational History    Not on file   Tobacco Use    Smoking status: Never     Passive exposure: Past    Smokeless tobacco: Never   Vaping Use    Vaping status: Never Used   Substance and Sexual Activity    Alcohol use: Yes     Comment: rare    Drug use: Never    Sexual activity: Not on file   Other Topics Concern    Not on file   Social History Narrative    Not on file     Social Determinants of Health     Financial Resource Strain: Not on file   Food Insecurity: No Food Insecurity (8/2/2024)    Hunger Vital Sign     Worried About Running Out of Food in the Last Year: Never true     Ran Out of Food in the Last Year: Never true   Transportation Needs: Not on file   Physical Activity: Not on file   Stress: Not on file   Social Connections: Not on file   Intimate Partner Violence: Not on file   Housing Stability: Not on file       Family History   Problem Relation Name Age of Onset    Dementia Mother      Scoliosis Mother      Macular degeneration Father      Liver cancer Father      Heart attack Father      Other (peripheral neuropathy) Father      Diabetes type I Son      Dementia Maternal Grandmother         Allergies:  Oxycodone and Penicillin v    Home Medications:  Prior to Admission medications    Medication Sig Start Date End Date Taking? Authorizing Provider   acetaminophen (TYLENOL ORAL) if needed.    Historical Provider, MD   aspirin 81 mg EC tablet Take 1 tablet (81 mg) by mouth once daily.    Historical Provider, MD   carbidopa-levodopa (Sinemet)  mg tablet 1.5 tabs at 8am / 1.5 tabs at 12pm / 1.5 tabs at 4pm and 1 tab at 8pm 8/19/24   Kamilla Chery MD   carboxymethylcellulose (Refresh Plus) 0.5 % ophthalmic solution Administer into affected eye(s) 4 times a day. 4/26/22   Historical Provider, MD   diclofenac sodium (Voltaren) 1 % gel gel Apply 4.5 inches (4 g) topically 4 times a day.  APPLY TO LOWER EXTREMITIES. DO NOT APPLY MORE  THAN 16 GM DAILY TO ANY ONE AFFECTED JOINT. 4/20/22   Historical Provider, MD   diphenhydramine HCl (BENADRYL ORAL) if needed.    Historical Provider, MD   docusate sodium (Colace) 100 mg capsule Take 2 capsules (200 mg) by mouth once daily.    Historical Provider, MD   fluticasone (Flonase) 50 mcg/actuation nasal spray Administer 2 sprays into each nostril if needed.    Historical Provider, MD   ibuprofen 600 mg tablet Take 1 tablet (600 mg) by mouth every 6 hours if needed. States takes 200 mg 7/7/22   Historical Provider, MD   immune globul G-gly-IgA avg 46 (Gamunex-C) 20 gram/200 mL (10 %) solution Infuse 70 gm divided over 1 days every 3 weeks x 6 months. Premedicate with 650 mg acetaminophen PO, 25 mg diphenhydramine PO, and 100 mg hydrocortisone IV push. 12/28/21   Historical Provider, MD   ketoconazole (NIZOral) 2 % cream Apply twice daily to affected areas of face 3/26/24   Antonio Chow MD   ketoconazole (NIZOral) 2 % shampoo Ketoconazole 2 % External Shampoo  Quantity: 120   Refills: 0  Start: 15-Sep-2021 9/15/21   Historical Provider, MD   melatonin 5 mg tablet Take by mouth.    Historical Provider, MD   multivitamin tablet Take 1 tablet by mouth once daily. Take with food    Historical Provider, MD   polyethylene glycol (Miralax) 17 gram/dose powder Take by mouth.    Historical Provider, MD   predniSONE (Deltasone) 5 mg tablet Take 1 tablet (5 mg) by mouth once daily. 3/4/24 3/4/25  Dianne Robertson MD   Restasis 0.05 % ophthalmic emulsion Administer 1 drop into both eyes every 12 hours. 6/12/24   Bonita Walsh MD   rosuvastatin (Crestor) 5 mg tablet Take 1 tablet (5 mg) by mouth once daily. 5/30/24   Ileana Bro DO   sertraline (Zoloft) 25 mg tablet Take 1 tablet (25 mg) by mouth once daily. 4/30/24   Ileana Bro DO       REVIEW OF SYSTEMS       Review of Systems   Constitutional:  Negative for fever.   HENT: Negative.     Respiratory: Negative.     Cardiovascular: Negative.   "  Gastrointestinal: Negative.    Genitourinary:  Negative for dysuria (Stoma in place, history of bladder cancer).   Musculoskeletal:  Positive for back pain (Lumbar back pain, chronic and unchanged per patient).   Neurological:  Positive for syncope (Unwitnessed fall, unknown if LOC). Negative for dizziness, weakness, light-headedness, numbness and headaches.   Psychiatric/Behavioral:  Positive for confusion.        PHYSICAL EXAM      INITIAL VITALS:   BP (!) 145/96   Pulse 74   Temp 36.5 °C (97.7 °F) (Temporal)   Resp 19   Ht 1.753 m (5' 9\")   Wt 74.4 kg (164 lb)   SpO2 96%   BMI 24.22 kg/m²     Physical Exam  Vitals reviewed.   HENT:      Head: Normocephalic and atraumatic.      Comments: No hemotympanum, no clarke signs, no raccoon eyes  Eyes:      Extraocular Movements: Extraocular movements intact.      Pupils: Pupils are equal, round, and reactive to light.   Cardiovascular:      Rate and Rhythm: Normal rate and regular rhythm.   Pulmonary:      Effort: Pulmonary effort is normal.      Breath sounds: Normal breath sounds. No stridor. No wheezing, rhonchi or rales.   Abdominal:      Palpations: Abdomen is soft.      Tenderness: There is no abdominal tenderness. There is no guarding or rebound.   Musculoskeletal:         General: Normal range of motion.      Cervical back: Normal range of motion. Tenderness (+ midline lumbar tenderness. No midline cervical or thoracic tenderness, no step offs or deformities) present.   Neurological:      General: No focal deficit present.      Mental Status: He is alert and oriented to person, place, and time.           DDX/DIAGNOSTIC RESULTS / EMERGENCY DEPARTMENT COURSE / MDM     Medical Decision Making  79-year-old male with history of Parkinson's presents after unwitnessed fall.  Patient amnestic to events prior to arrival.  Endorsing some lumbar pain, states this is chronic and unchanged.  Patient is oriented to self, but not time.  On arrival, airway intact, " bilateral breath sounds on auscultation, abdomen soft and nontender in all quadrants.  Pulses intact throughout.  Some midline lumbar tenderness to palpation, no step-offs or deformities.  No cervical or thoracic tenderness to palpation.  Wife reports finding him on the ground on his left side.  Patient uncertain if he had any prodromal symptoms.  Given unwitnessed nature of fall and patient's amnesia to events, will obtain broad workup including CT head, CT LS spine, chest abdomen pelvis, lab workup including electrolytes, WBC, Hgb, UA, and EKG with troponins.    Imaging showing left lower lobe infiltrate, otherwise emphysematous changes on CT chest.  Not requiring any oxygen.  Although patient does not currently have leukocytosis, will treat with antibiotics given patient's generally immunocompromise status.  Discussed findings with patient and family.  Discussed importance of close follow-up with primary care and/or cardiologist for stress test and echocardiogram.  Discussed importance of immediate return to the emergency department with any worsening symptoms including onset of chest pain, recurrent falls, increasing confusion.  Patient answering all questions appropriately on examination, voiced understanding and agreement with plan.  Able to ambulate in the department with a steady gait without assistance.  Family reports having mobility assistance equipment at home.        EKG  EKG, interpreted by me: Sinus rhythm.  No acute ST elevation or depression.  No acute T wave abnormalities.    All EKG's are interpreted by the Emergency Department Physician who either signs or Co-signs this chart in the absence of a cardiologist.    EMERGENCY DEPARTMENT COURSE:    ED Course as of 09/03/24 0019   Mon Sep 02, 2024   1914 Troponin I, High Sensitivity(!): 30  Unknown baseline. Denies chest pain. [JG]   221 Troponin I, High Sensitivity(!): 31  Delta less than 5 without symptoms. Will recommend close outpatient follow up  with cardiology for stress and echo. Patient continues to deny any chest pain or palpitations. [JG]      ED Course User Index  [JG] Nelia Hester MD         Diagnoses as of 09/03/24 0019   Fall, initial encounter   Pneumonia of left lower lobe due to infectious organism     CONSULTS:  None    FINAL IMPRESSION      1. Fall, initial encounter    2. Pneumonia of left lower lobe due to infectious organism          DISPOSITION / PLAN     9/2/2024 11:09 PM     PATIENT REFERRED TO:  Ileana Bro,   [address]  [facility]  [facility] [address]  [phone]    Schedule an appointment as soon as possible for a visit       [facility]  [address] [facility] [address] [phone]  Go to   If symptoms worsen      DISCHARGE MEDICATIONS:  Discharge Medication List as of 9/2/2024 10:41 PM        START taking these medications    Details   levoFLOXacin (Levaquin) 500 mg tablet Take 1 tablet (500 mg) by mouth once daily for 7 days., Starting Mon 9/2/2024, Until Mon 9/9/2024, Normal             Nelia Hester MD  Emergency Medicine Attending Physician    (Please note that portions of this note were completed with a voice recognition program.  Efforts were made to edit the dictations but occasionally words are mis-transcribed.)     Nelia Hester MD  09/03/24 0020

## 2024-09-03 ENCOUNTER — APPOINTMENT (OUTPATIENT)
Dept: CARDIOLOGY | Facility: HOSPITAL | Age: 79
End: 2024-09-03
Payer: MEDICARE

## 2024-09-03 LAB — HOLD SPECIMEN: NORMAL

## 2024-09-03 NOTE — DISCHARGE INSTRUCTIONS
You were seen in the emergency department after a fall.  Your imaging showed no acute traumatic injuries, your CT scan of your chest does show a very small infiltrate in your left lower lobe that could be consistent with an early developing pneumonia.  This infection has placed your heart under a small amount of strain which will likely resolve with antibiotic treatment, we recommend following up with your primary care provider and/or cardiologist for routine stress test and echocardiogram in the near future.    You were given a prescription for the antibiotic Levofloxacin. Please take the full course of antibiotics even if you feel your symptoms are improving.  Please stand up slowly, eat and drink regularly, and use any mobility assistance equipment that you have and use at baseline.    Please return to the emergency department with any worsening symptoms including increasing confusion, fevers that will not go down with Tylenol or Motrin, shortness of breath, chest pain or feeling of your heart racing, loss of consciousness, lightheadedness, dizziness, numbness, weakness or other concerns.

## 2024-09-06 ENCOUNTER — OFFICE VISIT (OUTPATIENT)
Dept: PRIMARY CARE | Facility: CLINIC | Age: 79
End: 2024-09-06
Payer: MEDICARE

## 2024-09-06 ENCOUNTER — TELEPHONE (OUTPATIENT)
Dept: NEUROLOGY | Facility: CLINIC | Age: 79
End: 2024-09-06

## 2024-09-06 ENCOUNTER — HOME HEALTH ADMISSION (OUTPATIENT)
Dept: HOME HEALTH SERVICES | Facility: HOME HEALTH | Age: 79
End: 2024-09-06
Payer: MEDICARE

## 2024-09-06 ENCOUNTER — DOCUMENTATION (OUTPATIENT)
Dept: HOME HEALTH SERVICES | Facility: HOME HEALTH | Age: 79
End: 2024-09-06

## 2024-09-06 VITALS
SYSTOLIC BLOOD PRESSURE: 110 MMHG | HEART RATE: 78 BPM | TEMPERATURE: 98.1 F | OXYGEN SATURATION: 97 % | DIASTOLIC BLOOD PRESSURE: 80 MMHG | WEIGHT: 163.8 LBS | BODY MASS INDEX: 24.19 KG/M2
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
## 2024-09-06 DIAGNOSIS — R26.89 UNSTABLE BALANCE: ICD-10-CM

## 2024-09-06 DIAGNOSIS — G20.C PARKINSONISM, UNSPECIFIED PARKINSONISM TYPE (MULTI): ICD-10-CM

## 2024-09-06 DIAGNOSIS — J18.9 PNEUMONIA OF LEFT LOWER LOBE DUE TO INFECTIOUS ORGANISM: Primary | ICD-10-CM

## 2024-09-06 PROCEDURE — 99214 OFFICE O/P EST MOD 30 MIN: CPT | Performed by: STUDENT IN AN ORGANIZED HEALTH CARE EDUCATION/TRAINING PROGRAM

## 2024-09-06 PROCEDURE — 1158F ADVNC CARE PLAN TLK DOCD: CPT | Performed by: STUDENT IN AN ORGANIZED HEALTH CARE EDUCATION/TRAINING PROGRAM

## 2024-09-06 PROCEDURE — 1123F ACP DISCUSS/DSCN MKR DOCD: CPT | Performed by: STUDENT IN AN ORGANIZED HEALTH CARE EDUCATION/TRAINING PROGRAM

## 2024-09-06 PROCEDURE — 1036F TOBACCO NON-USER: CPT | Performed by: STUDENT IN AN ORGANIZED HEALTH CARE EDUCATION/TRAINING PROGRAM

## 2024-09-06 PROCEDURE — 1160F RVW MEDS BY RX/DR IN RCRD: CPT | Performed by: STUDENT IN AN ORGANIZED HEALTH CARE EDUCATION/TRAINING PROGRAM

## 2024-09-06 PROCEDURE — 1159F MED LIST DOCD IN RCRD: CPT | Performed by: STUDENT IN AN ORGANIZED HEALTH CARE EDUCATION/TRAINING PROGRAM

## 2024-09-06 PROCEDURE — 1157F ADVNC CARE PLAN IN RCRD: CPT | Performed by: STUDENT IN AN ORGANIZED HEALTH CARE EDUCATION/TRAINING PROGRAM

## 2024-09-06 ASSESSMENT — ENCOUNTER SYMPTOMS
COUGH: 0
FEVER: 0
SHORTNESS OF BREATH: 0
BACK PAIN: 1
HEADACHES: 0
DIZZINESS: 0
CHILLS: 0
LIGHT-HEADEDNESS: 0
RHINORRHEA: 0

## 2024-09-06 ASSESSMENT — PATIENT HEALTH QUESTIONNAIRE - PHQ9
2. FEELING DOWN, DEPRESSED OR HOPELESS: MORE THAN HALF THE DAYS
SUM OF ALL RESPONSES TO PHQ9 QUESTIONS 1 AND 2: 2
1. LITTLE INTEREST OR PLEASURE IN DOING THINGS: NOT AT ALL

## 2024-09-06 NOTE — PROGRESS NOTES
"  Assessment/Plan   Problem List Items Addressed This Visit             ICD-10-CM       Neuro    Parkinsonism (Multi) G20.C    Relevant Orders    Referral to Home Care       Symptoms and Signs    Unstable balance R26.89    Relevant Orders    Referral to Home Care     Other Visit Diagnoses         Codes    Pneumonia of left lower lobe due to infectious organism    -  Primary J18.9    Fall, subsequent encounter     W19.XXXD    Relevant Orders    Referral to Home Care        I believe syncopal event could be due to carbidopa levodopa medication dose change vs vasovagal syncope  Has upcoming appointments with neurology  No further workup warranted at this time  Not entirely convinced d/t pneumonia but finish out levaquin therapy    Patient has had 4-5 falls in past year and is afraid of falling and has become more physically weak and debilitated and has required increased assistance using chair lift, for these reasons home health care referral made.      Subjective   Patient ID: Wade Montgomery \"Donato\" is a 79 y.o. male with past medical history significant for Parkinson's disease on carbidopa levodopa who presents for Hospital Follow-up (From 09/02/24- he had a Fall and went to hospital- had pneumonia and is currently on antibiotics. ).  HPI    Patient went to ED 9/2/24 for fall.  Per emergency medicine notes patient had increased dosage of carbidopa levodopa 1 month prior to presentation.  He does not recall the fall. His wife was getting ready to pack car for trip to their daughter's house and had left patient unattended for 1-2 mintues. Wife stated that she found patient on ground when she went back inside, laying on left side and was confused more so than baseline but was awake. No seizure like activity was noted. He was then taken to the ED.     He had been having some lower back pain which is usual for him and she does not believe his significantly worsened since the fall and was also experiencing chills.  He " denied headache, changes of vision, had lightheadedness, chest pain, shortness of breath, cough, abdominal pain, nausea, vomiting, diarrhea, fevers, numbness, tingling, weakness during this emergency room presentation.      CT head, CT lumbar spine, chest abdomen pelvis, lab workup which included the CBC, hemoglobin, urine analysis, EKG with troponins was performed.      Emergency room workup revealed left lower lobe infiltrate and some emphysematous changes on CT chest.  Patient was not requiring oxygen at the time.  Complete blood count did not reveal leukocytosis.      He was treated for pneumonia with antibiotics and told to follow-up in primary care office.    I reviewed CT Chest with patient. The left lower lobe basilar consolidation has been present in past but was slightly enlarged compared to prior imaging (now 3.4 cm compared to 3.1 cm). Patient did discuss most recent imaging study with his radiation oncologist and was told this was likely scar tissue. Patient feels well today without fevers, chills, SOB, cough, congestion, or confusion.    Patient has had over 4-5 falls in the past year and has become increasingly afraid of falling.  Additionally, he requires increased assistance with using the chairlift from going downstairs in his house to upstairs where his bedroom is.  His wife is too weak to help him into the chairlift and has to call relatives that do not live with him in order to help.  His relatives are not always there to help him maneuver from upstairs to downstairs.  Patient has also noted he has become physically more weak and debilitated and has increased balance issues.  We did discuss potential for home health aide, patient is agreeable to this referral.    Review of Systems   Constitutional:  Negative for chills and fever.   HENT:  Negative for congestion and rhinorrhea.    Respiratory:  Negative for cough and shortness of breath.    Cardiovascular:  Negative for chest pain.    Musculoskeletal:  Positive for back pain and gait problem.   Neurological:  Negative for dizziness, light-headedness and headaches.       Objective   Physical Exam  Constitutional:       Appearance: Normal appearance. He is ill-appearing (Chronically).   Eyes:      Extraocular Movements: Extraocular movements intact.      Conjunctiva/sclera: Conjunctivae normal.   Cardiovascular:      Rate and Rhythm: Normal rate and regular rhythm.   Pulmonary:      Effort: Pulmonary effort is normal.      Breath sounds: Normal breath sounds.   Musculoskeletal:      Right lower leg: No edema.      Left lower leg: No edema.   Skin:     Capillary Refill: Capillary refill takes less than 2 seconds.   Neurological:      Mental Status: He is alert.      Gait: Gait abnormal.            I spent a total of 30 minutes on the date of the service which included preparing to see the patient, face-to-face patient care, completing clinical documentation, performing a medically appropriate examination, counseling and educating the patient/family/caregiver and ordering medications, tests, or procedures.    Braydon Austin MD 09/06/24 11:44 AM

## 2024-09-06 NOTE — HH CARE COORDINATION
Home Care received a Referral for Physical Therapy and Occupational Therapy. We have processed the referral for a Start of Care on 09-07-24, 24-48 HOURS.     If you have any questions or concerns, please feel free to contact us at [phone]. Follow the prompts, enter your five digit zip code, and you will be directed to your care team on [facility].

## 2024-09-09 ENCOUNTER — HOME CARE VISIT (OUTPATIENT)
Dept: HOME HEALTH SERVICES | Facility: HOME HEALTH | Age: 79
End: 2024-09-09
Payer: MEDICARE

## 2024-09-09 VITALS
HEIGHT: 69 IN | HEART RATE: 75 BPM | TEMPERATURE: 97.1 F | OXYGEN SATURATION: 97 % | RESPIRATION RATE: 12 BRPM | SYSTOLIC BLOOD PRESSURE: 109 MMHG | BODY MASS INDEX: 24.44 KG/M2 | DIASTOLIC BLOOD PRESSURE: 80 MMHG | WEIGHT: 165 LBS

## 2024-09-09 PROCEDURE — 169592 NO-PAY CLAIM PROCEDURE

## 2024-09-09 PROCEDURE — G0151 HHCP-SERV OF PT,EA 15 MIN: HCPCS | Mod: HHH

## 2024-09-09 ASSESSMENT — ENCOUNTER SYMPTOMS
PAIN LOCATION: LEFT KNEE
PAIN LOCATION: BACK
HIGHEST PAIN SEVERITY IN PAST 24 HOURS: 3/10
PAIN: 1
PERSON REPORTING PAIN: PATIENT
OCCASIONAL FEELINGS OF UNSTEADINESS: 1
LOWEST PAIN SEVERITY IN PAST 24 HOURS: 1/10
SUBJECTIVE PAIN PROGRESSION: UNCHANGED

## 2024-09-09 ASSESSMENT — ACTIVITIES OF DAILY LIVING (ADL)
ENTERING_EXITING_HOME: CONTACT GUARD ASSIST
OASIS_M1830: 03
AMBULATION_DISTANCE/DURATION_TOLERATED: SHORT HOUSEHOLD
AMBULATION ASSISTANCE ON FLAT SURFACES: 1

## 2024-09-11 ENCOUNTER — HOME CARE VISIT (OUTPATIENT)
Dept: HOME HEALTH SERVICES | Facility: HOME HEALTH | Age: 79
End: 2024-09-11
Payer: MEDICARE

## 2024-09-11 PROCEDURE — G0151 HHCP-SERV OF PT,EA 15 MIN: HCPCS | Mod: HHH

## 2024-09-11 SDOH — ECONOMIC STABILITY: HOUSING INSECURITY: HOME SAFETY: NO CHANGE

## 2024-09-11 ASSESSMENT — ACTIVITIES OF DAILY LIVING (ADL)
AMBULATION_DISTANCE/DURATION_TOLERATED: SHORT HOUSEHOLD
AMBULATION ASSISTANCE ON FLAT SURFACES: 1

## 2024-09-11 ASSESSMENT — ENCOUNTER SYMPTOMS
SUBJECTIVE PAIN PROGRESSION: UNCHANGED
PAIN: 1
PERSON REPORTING PAIN: PATIENT

## 2024-09-13 ENCOUNTER — APPOINTMENT (OUTPATIENT)
Dept: INFUSION THERAPY | Facility: HOSPITAL | Age: 79
End: 2024-09-13
Payer: MEDICARE

## 2024-09-16 ENCOUNTER — HOME CARE VISIT (OUTPATIENT)
Dept: HOME HEALTH SERVICES | Facility: HOME HEALTH | Age: 79
End: 2024-09-16
Payer: MEDICARE

## 2024-09-16 PROCEDURE — G0152 HHCP-SERV OF OT,EA 15 MIN: HCPCS | Mod: HHH

## 2024-09-16 PROCEDURE — G0151 HHCP-SERV OF PT,EA 15 MIN: HCPCS | Mod: HHH

## 2024-09-16 SDOH — ECONOMIC STABILITY: HOUSING INSECURITY: HOME SAFETY: NO CHANGE

## 2024-09-16 ASSESSMENT — ENCOUNTER SYMPTOMS
DENIES PAIN: 1
DESCRIPTION OF MEMORY LOSS: SHORT TERM
PERSON REPORTING PAIN: PATIENT
PERSON REPORTING PAIN: PATIENT
DENIES PAIN: 1

## 2024-09-16 ASSESSMENT — ACTIVITIES OF DAILY LIVING (ADL): FINGER_FOOD_CURRENT_FUNCTION_INDEPENDENT: 1

## 2024-09-17 ENCOUNTER — APPOINTMENT (OUTPATIENT)
Dept: DERMATOLOGY | Facility: CLINIC | Age: 79
End: 2024-09-17
Payer: MEDICARE

## 2024-09-18 ENCOUNTER — HOME CARE VISIT (OUTPATIENT)
Dept: HOME HEALTH SERVICES | Facility: HOME HEALTH | Age: 79
End: 2024-09-18
Payer: MEDICARE

## 2024-09-18 PROCEDURE — G0151 HHCP-SERV OF PT,EA 15 MIN: HCPCS | Mod: HHH

## 2024-09-18 SDOH — ECONOMIC STABILITY: HOUSING INSECURITY: HOME SAFETY: NO CHANGE

## 2024-09-18 ASSESSMENT — ENCOUNTER SYMPTOMS
PERSON REPORTING PAIN: PATIENT
DENIES PAIN: 1

## 2024-09-18 ASSESSMENT — ACTIVITIES OF DAILY LIVING (ADL)
AMBULATION_DISTANCE/DURATION_TOLERATED: SHORT HOUSEHOLD
AMBULATION ASSISTANCE ON FLAT SURFACES: 1

## 2024-09-19 ENCOUNTER — APPOINTMENT (OUTPATIENT)
Dept: GASTROENTEROLOGY | Facility: CLINIC | Age: 79
End: 2024-09-19
Payer: MEDICARE

## 2024-09-19 VITALS — WEIGHT: 166 LBS | HEIGHT: 69 IN | BODY MASS INDEX: 24.59 KG/M2

## 2024-09-19 DIAGNOSIS — K58.2 IRRITABLE BOWEL SYNDROME WITH MIXED BOWEL HABITS: Primary | ICD-10-CM

## 2024-09-19 DIAGNOSIS — G20.A1 PARKINSON'S DISEASE, UNSPECIFIED WHETHER DYSKINESIA PRESENT, UNSPECIFIED WHETHER MANIFESTATIONS FLUCTUATE: ICD-10-CM

## 2024-09-19 PROCEDURE — 1160F RVW MEDS BY RX/DR IN RCRD: CPT | Performed by: INTERNAL MEDICINE

## 2024-09-19 PROCEDURE — 1036F TOBACCO NON-USER: CPT | Performed by: INTERNAL MEDICINE

## 2024-09-19 PROCEDURE — 1123F ACP DISCUSS/DSCN MKR DOCD: CPT | Performed by: INTERNAL MEDICINE

## 2024-09-19 PROCEDURE — 1157F ADVNC CARE PLAN IN RCRD: CPT | Performed by: INTERNAL MEDICINE

## 2024-09-19 PROCEDURE — 99215 OFFICE O/P EST HI 40 MIN: CPT | Performed by: INTERNAL MEDICINE

## 2024-09-19 PROCEDURE — 1159F MED LIST DOCD IN RCRD: CPT | Performed by: INTERNAL MEDICINE

## 2024-09-19 NOTE — LETTER
September 19, 2024     Ileana Bro DO  [address]  [facility]  [facility] [address]    Patient: Donato Montgomery   YOB: 1945   Date of Visit: 9/19/2024       Dear Dr. lIeana Bro DO:    Thank you for referring Donato Montgomery to me for evaluation. Below are my notes for this consultation.  If you have questions, please do not hesitate to call me. I look forward to following your patient along with you.       Sincerely,     Cali Noble MD, MS      CC: No Recipients  ______________________________________________________________________________________    Primary Care Provider: Ileana Bro DO  Referring Provider: No ref. provider found  My final recommendations will be communicated back to the referring physician and/or primary care provider via shared medical records or via US mail.    Chief Complaint (Reason for visit):   Wade Montgomery is a 79 y.o. male who presents for change in bowel movements    ASSESSMENT AND PLAN     Assessment & Plan  Irritable bowel syndrome with mixed bowel habits  Patient likely has IBS which has been made worse due to CIDP and autonomic neuropathy side effects secondary to Parkinson's disease    Going to patient's overall health status, I would prefer conservative management    -I advised the wife to manage patient's symptoms with combination of metamucil, miralax, docusate and prunes  -I wrote detailed inspection instructions for how to titrate the laxatives       Parkinson's disease, unspecified whether dyskinesia present, unspecified whether manifestations fluctuate (Multi)           The total time spent on rendering services for this patient (obtaining/reviewing additional history, performing medically appropriate exam and/or evaluation, reviewing the electronic chart, labs, imaging, independently interpreting the results and communicating and discussing with medical teams, counseling and educating the patient, discussing with  friend (present in room and authorized by patient), ordering medications, tests and/or procedures, coordinating care, and documentation) was 45 minutes.       Cali Noble MD, MS  9/19/2024    SUBJECTIVE   HPI: History obtained from patient.  Patient is accompanied by his wife  History is provided both by patient and the wife.  Wife provides most of the history as patient is unable to do so due to his clinical comorbidites     Patient comes to see me for irregular bowel movements    Wife states that, patient has bowel movement once every 3 to 4 days, to, 4 bowel movements per day.  Bowel movements are sometimes solid but thin, and sometimes they are loose watery.    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) (Multi)    • Contact with and (suspected) exposure to unspecified communicable disease 10/24/2019    Exposure to communicable disease   • Dry eye syndrome of bilateral lacrimal glands     Dry eye syndrome of both eyes   • Encounter for general adult medical examination without abnormal findings 08/19/2019    Initial Medicare annual wellness visit   • Excessive tear production 03/18/2024   • Finger injury 03/18/2024   • History of subdural hematoma 03/18/2024   • Nasal congestion 03/18/2024   • Other chest pain 07/14/2015    Chest pain, muscular   • Pain in left hip 04/20/2022    Left hip pain   • Pain in left knee 04/20/2022    Left knee pain   • Pain in scrotum 03/18/2024   • Pain of right lower  extremity 03/18/2024   • Pelvic and perineal pain 08/19/2019    Suprapubic pressure   • Personal history of other diseases of the circulatory system     History of subdural hematoma   • Personal history of other diseases of the digestive system 05/10/2018    History of rectal bleeding   • Personal history of other diseases of the musculoskeletal system and connective tissue 02/09/2022    History of low back pain   • Personal history of other diseases of the respiratory system 02/27/2014    History of acute bronchitis   • Personal history of other diseases of urinary system     History of hematuria   • Personal history of other drug therapy     History of influenza vaccination   • Personal history of other infectious and parasitic diseases 09/09/2019    History of tinea cruris   • Personal history of other infectious and parasitic diseases 03/30/2017    History of tinea cruris   • Personal history of other specified conditions 09/09/2020    History of flank pain   • Personal history of other specified conditions 03/03/2020    History of nasal congestion   • Personal history of other specified conditions 02/14/2022    History of dysuria   • Personal history of other specified conditions 12/10/2019    History of epigastric pain   • Personal history of urinary calculi 07/24/2019    History of urinary stone   • Rash and other nonspecific skin eruption 01/06/2017    Rash   • Retention of urine, unspecified 11/25/2020    Urinary retention with incomplete bladder emptying   • Shoulder pain 03/18/2024   • Strain of muscle, fascia and tendon of lower back, initial encounter 07/14/2015    Lumbar strain   • Tear film insufficiency 03/18/2024   • Tinea cruris 03/18/2024   • Trochanteric bursitis, left hip 09/22/2021    Greater trochanteric bursitis of left hip       Past Surgical History:   Procedure Laterality Date   • CT GUIDED PERCUTANEOUS BIOPSY LUNG  2/9/2022    CT GUIDED PERCUTANEOUS BIOPSY LUNG [place]20[place] [facility] [place]   •  OTHER SURGICAL HISTORY  02/13/2020    Hernia repair   • OTHER SURGICAL HISTORY  02/13/2020    Knee surgery   • OTHER SURGICAL HISTORY  02/13/2020    Trigger finger repair   • OTHER SURGICAL HISTORY  01/13/2020    Craniotomy   • OTHER SURGICAL HISTORY  01/13/2020    Carpal tunnel surgery   • OTHER SURGICAL HISTORY  10/27/2020    Sinus surgery   • OTHER SURGICAL HISTORY  02/16/2022    Cataract surgery   • OTHER SURGICAL HISTORY  01/26/2021    Nerve biospy       Current Outpatient Medications   Medication Sig Dispense Refill   • acetaminophen (TYLENOL ORAL) Take 2 tablets by mouth if needed (Mild pain).     • aspirin 81 mg EC tablet Take 1 tablet (81 mg) by mouth once daily.     • carbidopa-levodopa (Sinemet)  mg tablet 1.5 tabs at 8am / 1.5 tabs at 12pm / 1.5 tabs at 4pm and 1 tab at 8pm 450 tablet 3   • carboxymethylcellulose (Refresh Plus) 0.5 % ophthalmic solution Administer into affected eye(s) 4 times a day.     • diclofenac sodium (Voltaren) 1 % gel gel Apply 4.5 inches (4 g) topically 4 times a day.  APPLY TO LOWER EXTREMITIES. DO NOT APPLY MORE THAN 16 GM DAILY TO ANY ONE AFFECTED JOINT.     • diphenhydramine HCl (BENADRYL ORAL) Take 1 tablet by mouth if needed (Seasonal allergies).     • docusate sodium (Colace) 100 mg capsule Take 2 capsules (200 mg) by mouth once daily.     • fluticasone (Flonase) 50 mcg/actuation nasal spray Administer 2 sprays into each nostril if needed.     • ibuprofen 600 mg tablet Take 1 tablet by mouth every 6 hours if needed for mild pain (1 - 3). States takes 200 mg     • immune globul G-gly-IgA avg 46 (Gamunex-C) 20 gram/200 mL (10 %) solution Infuse 70 gm divided over 1 days every 3 weeks x 6 months. Premedicate with 650 mg acetaminophen PO, 25 mg diphenhydramine PO, and 100 mg hydrocortisone IV push.     • ketoconazole (NIZOral) 2 % cream Apply twice daily to affected areas of face 60 g 11   • ketoconazole (NIZOral) 2 % shampoo Ketoconazole 2 % External Shampoo  Quantity:  "120   Refills: 0  Start: 15-Sep-2021     • melatonin 5 mg tablet Take 5 mg by mouth once daily at bedtime.     • multivitamin tablet Take 1 tablet by mouth once daily. Take with food     • polyethylene glycol (Miralax) 17 gram/dose powder Take 17 g by mouth once daily.     • predniSONE (Deltasone) 5 mg tablet Take 1 tablet (5 mg) by mouth once daily. 90 tablet 3   • Restasis 0.05 % ophthalmic emulsion Administer 1 drop into both eyes every 12 hours. 180 each 3   • rosuvastatin (Crestor) 5 mg tablet Take 1 tablet (5 mg) by mouth once daily. 90 tablet 3   • sertraline (Zoloft) 25 mg tablet Take 1 tablet (25 mg) by mouth once daily. 90 tablet 3     No current facility-administered medications for this visit.       Allergies   Allergen Reactions   • Oxycodone Other and Hallucinations     Psychosis   • Penicillin V Other     OBJECTIVE   PHYSICAL EXAM:  Ht 1.753 m (5' 9\")   Wt 75.3 kg (166 lb)   BMI 24.51 kg/m²      LABS/IMAGING/SCOPES  Lab Results   Component Value Date    WBC 6.3 09/02/2024    HGB 13.2 (L) 09/02/2024    HCT 38.4 (L) 09/02/2024    MCV 94 09/02/2024     09/02/2024     Lab Results   Component Value Date    GLUCOSE 92 09/02/2024    CALCIUM 9.4 09/02/2024     (L) 09/02/2024    K 4.1 09/02/2024    CO2 27 09/02/2024     09/02/2024    BUN 12 09/02/2024    CREATININE 0.78 09/02/2024     Lab Results   Component Value Date    ALT <3 (L) 09/02/2024    AST 19 09/02/2024    ALKPHOS 64 09/02/2024    BILITOT 0.6 09/02/2024       === 09/02/24 ===    CT CHEST ABDOMEN PELVIS WO CONTRAST    - Impression -  1.Emphysematous changes with bilateral parenchymal scarring, LEFT  greater than RIGHT. LEFT lower lobe consolidation persists, slightly  increased in size compared prior examination.  2.RIGHT adrenal adenoma.  3.Prior cystectomy with creation of ileal conduit.  4.Cholelithiasis without evidence of acute cholecystitis.  5.Chronic compression fracture of the T8 vertebral body.  6.Degenerative changes " of the lumbar spine with concern for  impingement of exiting nerves at L4-5 and L5-S1.  Signed by MD Cali Song II, MD, MS  9/19/2024

## 2024-09-19 NOTE — PATIENT INSTRUCTIONS
Take Miralax 17 gm every day in 8-16 oz of water everyday  Take 1 scoop of fiber supplements like metamucil or citrucel everyday at bedtime with 8 oz of fluids. Eat a high fiber diet.  Take 2 tabs of Docusate in the morning  Take 5 prunes at bedtime (not prune juice)  Drink plenty of fluids through the day (64 oz or more)     Titrate the dose of miralax as per number of bowel movements  - If he has more than 3 Bowel movements in a day, skip Miralax the next day  - If he has not had bowel movements for 3 days, double the dose of Miralax every day until he starts having bowel movements

## 2024-09-19 NOTE — PROGRESS NOTES
Primary Care Provider: Ileana Bro DO  Referring Provider: No ref. provider found  My final recommendations will be communicated back to the referring physician and/or primary care provider via shared medical records or via US mail.    Chief Complaint (Reason for visit):   Wade Montgomery is a 79 y.o. male who presents for change in bowel movements    ASSESSMENT AND PLAN     Assessment & Plan  Irritable bowel syndrome with mixed bowel habits  Patient likely has IBS which has been made worse due to CIDP and autonomic neuropathy side effects secondary to Parkinson's disease    Going to patient's overall health status, I would prefer conservative management    -I advised the wife to manage patient's symptoms with combination of metamucil, miralax, docusate and prunes  -I wrote detailed inspection instructions for how to titrate the laxatives       Parkinson's disease, unspecified whether dyskinesia present, unspecified whether manifestations fluctuate (Multi)           The total time spent on rendering services for this patient (obtaining/reviewing additional history, performing medically appropriate exam and/or evaluation, reviewing the electronic chart, labs, imaging, independently interpreting the results and communicating and discussing with medical teams, counseling and educating the patient, discussing with friend (present in room and authorized by patient), ordering medications, tests and/or procedures, coordinating care, and documentation) was 45 minutes.       Cali Nbole MD, MS  9/19/2024    SUBJECTIVE   HPI: History obtained from patient.  Patient is accompanied by his wife  History is provided both by patient and the wife.  Wife provides most of the history as patient is unable to do so due to his clinical comorbidites     Patient comes to see me for irregular bowel movements    Wife states that, patient has bowel movement once every 3 to 4 days, to, 4 bowel movements per day.  Bowel movements  are sometimes solid but thin, and sometimes they are loose watery.    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) (Multi)     Contact with and (suspected) exposure to unspecified communicable disease 10/24/2019    Exposure to communicable disease    Dry eye syndrome of bilateral lacrimal glands     Dry eye syndrome of both eyes    Encounter for general adult medical examination without abnormal findings 08/19/2019    Initial Medicare annual wellness visit    Excessive tear production 03/18/2024    Finger injury 03/18/2024    History of subdural hematoma 03/18/2024    Nasal congestion 03/18/2024    Other chest pain 07/14/2015    Chest pain, muscular    Pain in left hip 04/20/2022    Left hip pain    Pain in left knee 04/20/2022    Left knee pain    Pain in scrotum 03/18/2024    Pain of right lower extremity 03/18/2024    Pelvic and perineal pain 08/19/2019    Suprapubic pressure    Personal history of other diseases of the circulatory system     History of subdural hematoma    Personal history of other diseases of the digestive system 05/10/2018    History of rectal bleeding    Personal history of other diseases of the musculoskeletal system and connective tissue 02/09/2022    History of low back pain    Personal history of other diseases of the respiratory system 02/27/2014    History of acute bronchitis    Personal history of other diseases of urinary system     History of hematuria    Personal history of other  drug therapy     History of influenza vaccination    Personal history of other infectious and parasitic diseases 09/09/2019    History of tinea cruris    Personal history of other infectious and parasitic diseases 03/30/2017    History of tinea cruris    Personal history of other specified conditions 09/09/2020    History of flank pain    Personal history of other specified conditions 03/03/2020    History of nasal congestion    Personal history of other specified conditions 02/14/2022    History of dysuria    Personal history of other specified conditions 12/10/2019    History of epigastric pain    Personal history of urinary calculi 07/24/2019    History of urinary stone    Rash and other nonspecific skin eruption 01/06/2017    Rash    Retention of urine, unspecified 11/25/2020    Urinary retention with incomplete bladder emptying    Shoulder pain 03/18/2024    Strain of muscle, fascia and tendon of lower back, initial encounter 07/14/2015    Lumbar strain    Tear film insufficiency 03/18/2024    Tinea cruris 03/18/2024    Trochanteric bursitis, left hip 09/22/2021    Greater trochanteric bursitis of left hip       Past Surgical History:   Procedure Laterality Date    CT GUIDED PERCUTANEOUS BIOPSY LUNG  2/9/2022    CT GUIDED PERCUTANEOUS BIOPSY LUNG 2/9/2022 [facility]    OTHER SURGICAL HISTORY  02/13/2020    Hernia repair    OTHER SURGICAL HISTORY  02/13/2020    Knee surgery    OTHER SURGICAL HISTORY  02/13/2020    Trigger finger repair    OTHER SURGICAL HISTORY  01/13/2020    Craniotomy    OTHER SURGICAL HISTORY  01/13/2020    Carpal tunnel surgery    OTHER SURGICAL HISTORY  10/27/2020    Sinus surgery    OTHER SURGICAL HISTORY  02/16/2022    Cataract surgery    OTHER SURGICAL HISTORY  01/26/2021    Nerve biospy       Current Outpatient Medications   Medication Sig Dispense Refill    acetaminophen (TYLENOL ORAL) Take 2 tablets by mouth if needed (Mild pain).      aspirin 81 mg EC tablet Take 1 tablet (81 mg)  by mouth once daily.      carbidopa-levodopa (Sinemet)  mg tablet 1.5 tabs at 8am / 1.5 tabs at 12pm / 1.5 tabs at 4pm and 1 tab at 8pm 450 tablet 3    carboxymethylcellulose (Refresh Plus) 0.5 % ophthalmic solution Administer into affected eye(s) 4 times a day.      diclofenac sodium (Voltaren) 1 % gel gel Apply 4.5 inches (4 g) topically 4 times a day.  APPLY TO LOWER EXTREMITIES. DO NOT APPLY MORE THAN 16 GM DAILY TO ANY ONE AFFECTED JOINT.      diphenhydramine HCl (BENADRYL ORAL) Take 1 tablet by mouth if needed (Seasonal allergies).      docusate sodium (Colace) 100 mg capsule Take 2 capsules (200 mg) by mouth once daily.      fluticasone (Flonase) 50 mcg/actuation nasal spray Administer 2 sprays into each nostril if needed.      ibuprofen 600 mg tablet Take 1 tablet by mouth every 6 hours if needed for mild pain (1 - 3). States takes 200 mg      immune globul G-gly-IgA avg 46 (Gamunex-C) 20 gram/200 mL (10 %) solution Infuse 70 gm divided over 1 days every 3 weeks x 6 months. Premedicate with 650 mg acetaminophen PO, 25 mg diphenhydramine PO, and 100 mg hydrocortisone IV push.      ketoconazole (NIZOral) 2 % cream Apply twice daily to affected areas of face 60 g 11    ketoconazole (NIZOral) 2 % shampoo Ketoconazole 2 % External Shampoo  Quantity: 120   Refills: 0  Start: 15-Sep-2021      melatonin 5 mg tablet Take 5 mg by mouth once daily at bedtime.      multivitamin tablet Take 1 tablet by mouth once daily. Take with food      polyethylene glycol (Miralax) 17 gram/dose powder Take 17 g by mouth once daily.      predniSONE (Deltasone) 5 mg tablet Take 1 tablet (5 mg) by mouth once daily. 90 tablet 3    Restasis 0.05 % ophthalmic emulsion Administer 1 drop into both eyes every 12 hours. 180 each 3    rosuvastatin (Crestor) 5 mg tablet Take 1 tablet (5 mg) by mouth once daily. 90 tablet 3    sertraline (Zoloft) 25 mg tablet Take 1 tablet (25 mg) by mouth once daily. 90 tablet 3     No current  "facility-administered medications for this visit.       Allergies   Allergen Reactions    Oxycodone Other and Hallucinations     Psychosis    Penicillin V Other     OBJECTIVE   PHYSICAL EXAM:  Ht 1.753 m (5' 9\")   Wt 75.3 kg (166 lb)   BMI 24.51 kg/m²      LABS/IMAGING/SCOPES  Lab Results   Component Value Date    WBC 6.3 09/02/2024    HGB 13.2 (L) 09/02/2024    HCT 38.4 (L) 09/02/2024    MCV 94 09/02/2024     09/02/2024     Lab Results   Component Value Date    GLUCOSE 92 09/02/2024    CALCIUM 9.4 09/02/2024     (L) 09/02/2024    K 4.1 09/02/2024    CO2 27 09/02/2024     09/02/2024    BUN 12 09/02/2024    CREATININE 0.78 09/02/2024     Lab Results   Component Value Date    ALT <3 (L) 09/02/2024    AST 19 09/02/2024    ALKPHOS 64 09/02/2024    BILITOT 0.6 09/02/2024       === 09/02/24 ===    CT CHEST ABDOMEN PELVIS WO CONTRAST    - Impression -  1.Emphysematous changes with bilateral parenchymal scarring, LEFT  greater than RIGHT. LEFT lower lobe consolidation persists, slightly  increased in size compared prior examination.  2.RIGHT adrenal adenoma.  3.Prior cystectomy with creation of ileal conduit.  4.Cholelithiasis without evidence of acute cholecystitis.  5.Chronic compression fracture of the T8 vertebral body.  6.Degenerative changes of the lumbar spine with concern for  impingement of exiting nerves at L4-5 and L5-S1.  Signed by MD Cali Song II, MD, MS  9/19/2024  "

## 2024-09-20 ENCOUNTER — HOME CARE VISIT (OUTPATIENT)
Dept: HOME HEALTH SERVICES | Facility: HOME HEALTH | Age: 79
End: 2024-09-20
Payer: MEDICARE

## 2024-09-20 PROCEDURE — G0152 HHCP-SERV OF OT,EA 15 MIN: HCPCS | Mod: HHH

## 2024-09-23 ENCOUNTER — HOME CARE VISIT (OUTPATIENT)
Dept: HOME HEALTH SERVICES | Facility: HOME HEALTH | Age: 79
End: 2024-09-23
Payer: MEDICARE

## 2024-09-23 PROCEDURE — G0151 HHCP-SERV OF PT,EA 15 MIN: HCPCS | Mod: HHH

## 2024-09-23 SDOH — ECONOMIC STABILITY: HOUSING INSECURITY: HOME SAFETY: NO CHANGE

## 2024-09-23 ASSESSMENT — ACTIVITIES OF DAILY LIVING (ADL)
AMBULATION_DISTANCE/DURATION_TOLERATED: HOUSEHOLD
AMBULATION ASSISTANCE ON FLAT SURFACES: 1

## 2024-09-24 ENCOUNTER — HOME CARE VISIT (OUTPATIENT)
Dept: HOME HEALTH SERVICES | Facility: HOME HEALTH | Age: 79
End: 2024-09-24
Payer: MEDICARE

## 2024-09-24 PROCEDURE — G0152 HHCP-SERV OF OT,EA 15 MIN: HCPCS | Mod: HHH

## 2024-09-24 ASSESSMENT — ENCOUNTER SYMPTOMS
PERSON REPORTING PAIN: PATIENT
DENIES PAIN: 1

## 2024-09-25 ENCOUNTER — HOME CARE VISIT (OUTPATIENT)
Dept: HOME HEALTH SERVICES | Facility: HOME HEALTH | Age: 79
End: 2024-09-25
Payer: MEDICARE

## 2024-09-25 PROCEDURE — G0151 HHCP-SERV OF PT,EA 15 MIN: HCPCS | Mod: HHH

## 2024-09-25 SDOH — ECONOMIC STABILITY: HOUSING INSECURITY: HOME SAFETY: NO CHANGE

## 2024-09-25 ASSESSMENT — ACTIVITIES OF DAILY LIVING (ADL)
AMBULATION ASSISTANCE ON FLAT SURFACES: 1
AMBULATION_DISTANCE/DURATION_TOLERATED: HOUSEHOLD

## 2024-09-25 ASSESSMENT — ENCOUNTER SYMPTOMS
PERSON REPORTING PAIN: PATIENT
DENIES PAIN: 1

## 2024-09-26 ENCOUNTER — INFUSION (OUTPATIENT)
Dept: INFUSION THERAPY | Facility: HOSPITAL | Age: 79
End: 2024-09-26
Payer: MEDICARE

## 2024-09-26 VITALS
RESPIRATION RATE: 16 BRPM | WEIGHT: 162.48 LBS | HEART RATE: 87 BPM | OXYGEN SATURATION: 96 % | DIASTOLIC BLOOD PRESSURE: 90 MMHG | SYSTOLIC BLOOD PRESSURE: 151 MMHG | TEMPERATURE: 97.6 F | BODY MASS INDEX: 23.99 KG/M2

## 2024-09-26 DIAGNOSIS — G61.81 CHRONIC INFLAMMATORY DEMYELINATING POLYNEURITIS (MULTI): ICD-10-CM

## 2024-09-26 LAB
ALBUMIN SERPL BCP-MCNC: 3.9 G/DL (ref 3.4–5)
ALP SERPL-CCNC: 67 U/L (ref 33–136)
ALT SERPL W P-5'-P-CCNC: 5 U/L (ref 10–52)
ANION GAP SERPL CALC-SCNC: 10 MMOL/L (ref 10–20)
AST SERPL W P-5'-P-CCNC: 16 U/L (ref 9–39)
BASOPHILS # BLD AUTO: 0.01 X10*3/UL (ref 0–0.1)
BASOPHILS NFR BLD AUTO: 0.2 %
BILIRUB SERPL-MCNC: 0.6 MG/DL (ref 0–1.2)
BUN SERPL-MCNC: 12 MG/DL (ref 6–23)
CALCIUM SERPL-MCNC: 8.7 MG/DL (ref 8.6–10.3)
CHLORIDE SERPL-SCNC: 100 MMOL/L (ref 98–107)
CO2 SERPL-SCNC: 30 MMOL/L (ref 21–32)
CREAT SERPL-MCNC: 0.75 MG/DL (ref 0.5–1.3)
EGFRCR SERPLBLD CKD-EPI 2021: >90 ML/MIN/1.73M*2
EOSINOPHIL # BLD AUTO: 0.02 X10*3/UL (ref 0–0.4)
EOSINOPHIL NFR BLD AUTO: 0.3 %
ERYTHROCYTE [DISTWIDTH] IN BLOOD BY AUTOMATED COUNT: 12.4 % (ref 11.5–14.5)
GLUCOSE SERPL-MCNC: 92 MG/DL (ref 74–99)
HCT VFR BLD AUTO: 38.1 % (ref 41–52)
HGB BLD-MCNC: 13 G/DL (ref 13.5–17.5)
IMM GRANULOCYTES # BLD AUTO: 0.01 X10*3/UL (ref 0–0.5)
IMM GRANULOCYTES NFR BLD AUTO: 0.2 % (ref 0–0.9)
LYMPHOCYTES # BLD AUTO: 1.11 X10*3/UL (ref 0.8–3)
LYMPHOCYTES NFR BLD AUTO: 18.9 %
MCH RBC QN AUTO: 32 PG (ref 26–34)
MCHC RBC AUTO-ENTMCNC: 34.1 G/DL (ref 32–36)
MCV RBC AUTO: 94 FL (ref 80–100)
MONOCYTES # BLD AUTO: 0.55 X10*3/UL (ref 0.05–0.8)
MONOCYTES NFR BLD AUTO: 9.4 %
NEUTROPHILS # BLD AUTO: 4.16 X10*3/UL (ref 1.6–5.5)
NEUTROPHILS NFR BLD AUTO: 71 %
NRBC BLD-RTO: 0 /100 WBCS (ref 0–0)
PLATELET # BLD AUTO: 158 X10*3/UL (ref 150–450)
POTASSIUM SERPL-SCNC: 4 MMOL/L (ref 3.5–5.3)
PROT SERPL-MCNC: 7 G/DL (ref 6.4–8.2)
RBC # BLD AUTO: 4.06 X10*6/UL (ref 4.5–5.9)
SODIUM SERPL-SCNC: 136 MMOL/L (ref 136–145)
WBC # BLD AUTO: 5.9 X10*3/UL (ref 4.4–11.3)

## 2024-09-26 PROCEDURE — 84075 ASSAY ALKALINE PHOSPHATASE: CPT

## 2024-09-26 PROCEDURE — 2500000004 HC RX 250 GENERAL PHARMACY W/ HCPCS (ALT 636 FOR OP/ED): Mod: JZ | Performed by: PSYCHIATRY & NEUROLOGY

## 2024-09-26 PROCEDURE — 96365 THER/PROPH/DIAG IV INF INIT: CPT | Mod: INF

## 2024-09-26 PROCEDURE — 96375 TX/PRO/DX INJ NEW DRUG ADDON: CPT | Mod: INF

## 2024-09-26 PROCEDURE — 96366 THER/PROPH/DIAG IV INF ADDON: CPT | Mod: INF

## 2024-09-26 PROCEDURE — 85025 COMPLETE CBC W/AUTO DIFF WBC: CPT

## 2024-09-26 PROCEDURE — 2500000001 HC RX 250 WO HCPCS SELF ADMINISTERED DRUGS (ALT 637 FOR MEDICARE OP): Performed by: PSYCHIATRY & NEUROLOGY

## 2024-09-26 PROCEDURE — 36415 COLL VENOUS BLD VENIPUNCTURE: CPT

## 2024-09-26 RX ORDER — HEPARIN SODIUM,PORCINE/PF 10 UNIT/ML
50 SYRINGE (ML) INTRAVENOUS AS NEEDED
Status: DISCONTINUED | OUTPATIENT
Start: 2024-09-26 | End: 2024-09-26 | Stop reason: HOSPADM

## 2024-09-26 RX ORDER — ACETAMINOPHEN 325 MG/1
650 TABLET ORAL ONCE
Status: COMPLETED | OUTPATIENT
Start: 2024-09-26 | End: 2024-09-26

## 2024-09-26 RX ORDER — ACETAMINOPHEN 325 MG/1
650 TABLET ORAL ONCE
OUTPATIENT
Start: 2024-10-04 | End: 2024-10-04

## 2024-09-26 RX ORDER — DIPHENHYDRAMINE HCL 25 MG
25 CAPSULE ORAL ONCE
OUTPATIENT
Start: 2024-10-04 | End: 2024-10-04

## 2024-09-26 RX ORDER — HEPARIN 100 UNIT/ML
500 SYRINGE INTRAVENOUS AS NEEDED
OUTPATIENT
Start: 2024-09-26

## 2024-09-26 RX ORDER — HEPARIN SODIUM,PORCINE/PF 10 UNIT/ML
50 SYRINGE (ML) INTRAVENOUS AS NEEDED
OUTPATIENT
Start: 2024-09-26

## 2024-09-26 RX ORDER — DIPHENHYDRAMINE HCL 25 MG
25 CAPSULE ORAL ONCE
Status: COMPLETED | OUTPATIENT
Start: 2024-09-26 | End: 2024-09-26

## 2024-09-26 ASSESSMENT — ENCOUNTER SYMPTOMS
LOSS OF SENSATION IN FEET: 1
DEPRESSION: 0
OCCASIONAL FEELINGS OF UNSTEADINESS: 1

## 2024-09-27 ENCOUNTER — HOME CARE VISIT (OUTPATIENT)
Dept: HOME HEALTH SERVICES | Facility: HOME HEALTH | Age: 79
End: 2024-09-27
Payer: MEDICARE

## 2024-09-27 PROCEDURE — G0152 HHCP-SERV OF OT,EA 15 MIN: HCPCS | Mod: HHH

## 2024-09-27 ASSESSMENT — ENCOUNTER SYMPTOMS
PERSON REPORTING PAIN: PATIENT
DENIES PAIN: 1

## 2024-09-30 ENCOUNTER — HOME CARE VISIT (OUTPATIENT)
Dept: HOME HEALTH SERVICES | Facility: HOME HEALTH | Age: 79
End: 2024-09-30
Payer: MEDICARE

## 2024-09-30 PROCEDURE — G0152 HHCP-SERV OF OT,EA 15 MIN: HCPCS | Mod: HHH

## 2024-10-01 ENCOUNTER — HOME CARE VISIT (OUTPATIENT)
Dept: HOME HEALTH SERVICES | Facility: HOME HEALTH | Age: 79
End: 2024-10-01
Payer: MEDICARE

## 2024-10-01 ENCOUNTER — APPOINTMENT (OUTPATIENT)
Dept: PHYSICAL THERAPY | Facility: CLINIC | Age: 79
End: 2024-10-01
Payer: MEDICARE

## 2024-10-01 PROCEDURE — G0151 HHCP-SERV OF PT,EA 15 MIN: HCPCS | Mod: HHH

## 2024-10-01 ASSESSMENT — ENCOUNTER SYMPTOMS
PERSON REPORTING PAIN: PATIENT
PAIN LOCATION: RIGHT SHOULDER
DENIES PAIN: 1
PAIN: 1
PAIN LOCATION - PAIN QUALITY: SORE
PERSON REPORTING PAIN: PATIENT

## 2024-10-03 ENCOUNTER — HOME CARE VISIT (OUTPATIENT)
Dept: HOME HEALTH SERVICES | Facility: HOME HEALTH | Age: 79
End: 2024-10-03
Payer: MEDICARE

## 2024-10-03 PROCEDURE — G0152 HHCP-SERV OF OT,EA 15 MIN: HCPCS | Mod: HHH

## 2024-10-03 ASSESSMENT — ENCOUNTER SYMPTOMS
PAIN LOCATION - PAIN SEVERITY: 6/10
PAIN: 1
PAIN LOCATION: RIGHT ARM
HIGHEST PAIN SEVERITY IN PAST 24 HOURS: 6/10
PERSON REPORTING PAIN: PATIENT
LOWEST PAIN SEVERITY IN PAST 24 HOURS: 0/10
PAIN SEVERITY GOAL: 0/10
SUBJECTIVE PAIN PROGRESSION: WAXING AND WANING

## 2024-10-04 ENCOUNTER — APPOINTMENT (OUTPATIENT)
Dept: INFUSION THERAPY | Facility: HOSPITAL | Age: 79
End: 2024-10-04
Payer: MEDICARE

## 2024-10-04 ENCOUNTER — HOME CARE VISIT (OUTPATIENT)
Dept: HOME HEALTH SERVICES | Facility: HOME HEALTH | Age: 79
End: 2024-10-04
Payer: MEDICARE

## 2024-10-07 ENCOUNTER — HOME CARE VISIT (OUTPATIENT)
Dept: HOME HEALTH SERVICES | Facility: HOME HEALTH | Age: 79
End: 2024-10-07
Payer: MEDICARE

## 2024-10-07 PROCEDURE — G0152 HHCP-SERV OF OT,EA 15 MIN: HCPCS | Mod: HHH

## 2024-10-07 ASSESSMENT — ENCOUNTER SYMPTOMS: DENIES PAIN: 1

## 2024-10-08 ENCOUNTER — HOME CARE VISIT (OUTPATIENT)
Dept: HOME HEALTH SERVICES | Facility: HOME HEALTH | Age: 79
End: 2024-10-08
Payer: MEDICARE

## 2024-10-08 PROCEDURE — G0151 HHCP-SERV OF PT,EA 15 MIN: HCPCS | Mod: HHH

## 2024-10-08 ASSESSMENT — ENCOUNTER SYMPTOMS
OCCASIONAL FEELINGS OF UNSTEADINESS: 1
PERSON REPORTING PAIN: PATIENT
DENIES PAIN: 1

## 2024-10-08 ASSESSMENT — ACTIVITIES OF DAILY LIVING (ADL)
AMBULATION_DISTANCE/DURATION_TOLERATED: HOUSEHOLD DISTANCES
AMBULATION ASSISTANCE ON FLAT SURFACES: 1

## 2024-10-10 ENCOUNTER — APPOINTMENT (OUTPATIENT)
Dept: NEUROLOGY | Facility: CLINIC | Age: 79
End: 2024-10-10
Payer: MEDICARE

## 2024-10-10 VITALS
BODY MASS INDEX: 23.99 KG/M2 | DIASTOLIC BLOOD PRESSURE: 83 MMHG | HEIGHT: 69 IN | HEART RATE: 72 BPM | SYSTOLIC BLOOD PRESSURE: 149 MMHG | WEIGHT: 162 LBS

## 2024-10-10 DIAGNOSIS — F02.80 DEMENTIA ASSOCIATED WITH PARKINSON DISEASE: ICD-10-CM

## 2024-10-10 DIAGNOSIS — G20.C PARKINSONISM, UNSPECIFIED PARKINSONISM TYPE (MULTI): Primary | ICD-10-CM

## 2024-10-10 DIAGNOSIS — G20.A1 PSYCHOSIS DUE TO PARKINSON DISEASE: ICD-10-CM

## 2024-10-10 DIAGNOSIS — G20.A1 DEMENTIA ASSOCIATED WITH PARKINSON DISEASE: ICD-10-CM

## 2024-10-10 DIAGNOSIS — F06.8 PSYCHOSIS DUE TO PARKINSON DISEASE: ICD-10-CM

## 2024-10-10 PROCEDURE — 99214 OFFICE O/P EST MOD 30 MIN: CPT | Performed by: PSYCHIATRY & NEUROLOGY

## 2024-10-10 PROCEDURE — 1159F MED LIST DOCD IN RCRD: CPT | Performed by: PSYCHIATRY & NEUROLOGY

## 2024-10-10 PROCEDURE — 1157F ADVNC CARE PLAN IN RCRD: CPT | Performed by: PSYCHIATRY & NEUROLOGY

## 2024-10-10 PROCEDURE — 1036F TOBACCO NON-USER: CPT | Performed by: PSYCHIATRY & NEUROLOGY

## 2024-10-10 PROCEDURE — 1123F ACP DISCUSS/DSCN MKR DOCD: CPT | Performed by: PSYCHIATRY & NEUROLOGY

## 2024-10-10 PROCEDURE — 1160F RVW MEDS BY RX/DR IN RCRD: CPT | Performed by: PSYCHIATRY & NEUROLOGY

## 2024-10-10 PROCEDURE — G2211 COMPLEX E/M VISIT ADD ON: HCPCS | Performed by: PSYCHIATRY & NEUROLOGY

## 2024-10-10 RX ORDER — PIMAVANSERIN TARTRATE 34 MG/1
34 CAPSULE ORAL DAILY
Qty: 90 CAPSULE | Refills: 3 | Status: SHIPPED | OUTPATIENT
Start: 2024-10-10

## 2024-10-10 NOTE — PATIENT INSTRUCTIONS
"It was a pleasure seeing you today.     Please make a follow up appointment in 4-6 months.  You may also schedule a phone or virtual visit sooner on a Friday morning with me as needed before the next clinic appointment.     For any urgent issues or needing to speak to a medical assistant please call [phone], option 6 during our office hours Monday-Friday 8am-4pm, and leave a voicemail with your concern.  My office will try to reach back you as soon as possible within 24 (business) hours.  If you have an emergency please call 911 or visit a local urgent care or nearest emergency room.      Please understand that Easy-Point is a useful communication tool for simple \"normal\" results or a refill request but I would not recommend using this tool for emergent or urgent issues or for conversations with me.  I am happy to ask my staff to rearrange a follow up visit or a virtual visit sooner than requested if appropriate for your care.    "

## 2024-10-10 NOTE — PROGRESS NOTES
Subjective     Wade Montgomery is a 79 y.o. year old male here for parkinsonism follow up.   Arrives with wife who provides more history.  Parkinson's Disease  Arrives with wife today.  He is falling a lot more often.  Uses a walker.   Hand and finger flexion is worse.   He fell recently and was in the ER in 9/2.  If he grabs his walker he has a hard time letting go of it.   More confused and more hallucinations. He saw a little man in the house. Sleep is not great.  Anxiety is worse.     Qamar scan + abnormal and consistent with PD.  He fell 4 days ago at home trying to move furniture and his left leg got stuck on walker and fell forward.   No hallucinations.   He was on Sinemet 2 tabs TID (8 am /1pm / 6pm)  but had some hallucinations, shadow people. He is seeing GI specialist for constipation.   MRI Brain was normal.    Takes Sinemet 25-100mg 1.5 tabs / 1.5 tabs/ 1.5 tabs/ 1 tab    He has CIPD. IVIG treatments he goes to infusion center at [facility].  On prednisone.   Also noticed fine motor movements are impaired, facial expressions are masked more. Voice is softer.       FH: no PD in family. father had neuropathy. spine issues in family. 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   PMH: scoliosis. neuropathy.      hx of craniotomy and SDH 2012 -secondary from acting out is dreams ( hit his night stand)     Review of Systems    Patient Active Problem List   Diagnosis    Bladder cancer (Multi)    Chronic inflammatory demyelinating polyneuropathy (Multi)    Coronary artery disease involving native coronary artery of native heart without angina pectoris    Prostate cancer (Multi)    Unstable gait    Lung cancer (Multi)    Abnormal ejaculation    Abnormal stress test    Acquired involutional ptosis of both eyelids    Arthritis of knee, right    Astigmatism    Hyperopia    Baker cyst    Basal cell carcinoma of skin of right upper limb, including shoulder    Basal cell carcinoma of  skin of other parts of face    Bilateral impacted cerumen    Bilateral partial vocal cord paralysis    Blepharitis, left eye    Calf swelling    Carcinoma in situ of skin of scalp and neck    Choroidal nevus of left eye    Dermatochalasis    Disturbance of skin sensation    CHAUDHARI (dyspnea on exertion)    Dysphagia    Eustachian tube dysfunction    Glossopharyngeal nerve disorder    Peripheral neuropathy    Glottic insufficiency    Ground glass opacity present on imaging of lung    Hemangioma of skin and subcutaneous tissue    Hematuria    Hemodynamic instability    History of bladder cancer    Personal history of other malignant neoplasm of skin    Hoarseness of voice    Hypovolemia    IgM monoclonal gammopathy of uncertain significance    MGUS (monoclonal gammopathy of unknown significance)    Kappa light chain disease (Multi)    Left inguinal hernia    Lichen simplex chronicus    Lung nodule, multiple    Malignant neoplasm of lower lobe of left lung (Multi)    Melanocytic nevi of scalp and neck    Melanocytic nevi of trunk    Melanocytic nevi of unspecified lower limb, including hip    Melanocytic nevi of unspecified upper limb, including shoulder    Melanocytic nevi of other parts of face    Memory changes    Mixed hyperlipidemia    Movement disorder    Neoplasm of uncertain behavior of skin    Actinic keratosis    Inflamed seborrheic keratosis    Other seborrheic keratosis    Skin changes due to chronic exposure to nonionizing radiation, unspecified    Parkinsonism (Multi)    PCO (posterior capsular opacification), right    Pseudophakia    Scar condition and fibrosis of skin    Seborrheic dermatitis, unspecified    Sensorineural hearing loss, bilateral    Spinal stenosis of lumbar region at multiple levels    Spinal stenosis    SPK (superficial punctate keratitis)    Tongue fasciculation    Trigger finger, acquired    Trigger middle finger of right hand    Unspecified voice and resonance disorder    Unstable  balance    Urinary retention    Weakness of both hands    Weakness of extremity    Xerosis cutis    Malignant neoplasm of overlapping sites of bladder (Multi)    Urothelial cancer (Multi)    SCC (squamous cell carcinoma)    Anemia    Chest pain    Chronic inflammatory demyelinating polyneuritis (Multi)    Polyneuropathy    Erectile disorder    Monoclonal gammopathy    Idiopathic progressive neuropathy    Secondary malignant neoplasm of left lung (Multi)    Other specified disorders of adrenal gland    Disease due to severe acute respiratory syndrome coronavirus 2 (SARS-CoV-2)    Constipation    Acute urinary tract infection    Eye tearing, bilateral    Abnormal findings on diagnostic imaging of lung    Keratoconjunctivitis sicca of both eyes not specified as Sjogren's    Family history of glaucoma    Hyperopia, bilateral    Presbyopia     Past Medical History:   Diagnosis Date    Acute pain in scrotum 03/18/2024    Bicipital tendinitis, left shoulder 02/27/2014    Biceps tendonitis on left    CIDP with CNS overlap (chronic inflammatory demyelinating polyneuritis) (Multi)     Contact with and (suspected) exposure to unspecified communicable disease 10/24/2019    Exposure to communicable disease    Dry eye syndrome of bilateral lacrimal glands     Dry eye syndrome of both eyes    Encounter for general adult medical examination without abnormal findings 08/19/2019    Initial Medicare annual wellness visit    Excessive tear production 03/18/2024    Finger injury 03/18/2024    History of subdural hematoma 03/18/2024    Nasal congestion 03/18/2024    Other chest pain 07/14/2015    Chest pain, muscular    Pain in left hip 04/20/2022    Left hip pain    Pain in left knee 04/20/2022    Left knee pain    Pain in scrotum 03/18/2024    Pain of right lower extremity 03/18/2024    Pelvic and perineal pain 08/19/2019    Suprapubic pressure    Personal history of other diseases of the circulatory system     History of subdural  hematoma    Personal history of other diseases of the digestive system 05/10/2018    History of rectal bleeding    Personal history of other diseases of the musculoskeletal system and connective tissue 02/09/2022    History of low back pain    Personal history of other diseases of the respiratory system 02/27/2014    History of acute bronchitis    Personal history of other diseases of urinary system     History of hematuria    Personal history of other drug therapy     History of influenza vaccination    Personal history of other infectious and parasitic diseases 09/09/2019    History of tinea cruris    Personal history of other infectious and parasitic diseases 03/30/2017    History of tinea cruris    Personal history of other specified conditions 09/09/2020    History of flank pain    Personal history of other specified conditions 03/03/2020    History of nasal congestion    Personal history of other specified conditions 02/14/2022    History of dysuria    Personal history of other specified conditions 12/10/2019    History of epigastric pain    Personal history of urinary calculi 07/24/2019    History of urinary stone    Rash and other nonspecific skin eruption 01/06/2017    Rash    Retention of urine, unspecified 11/25/2020    Urinary retention with incomplete bladder emptying    Shoulder pain 03/18/2024    Strain of muscle, fascia and tendon of lower back, initial encounter 07/14/2015    Lumbar strain    Tear film insufficiency 03/18/2024    Tinea cruris 03/18/2024    Trochanteric bursitis, left hip 09/22/2021    Greater trochanteric bursitis of left hip     Past Surgical History:   Procedure Laterality Date    CT GUIDED PERCUTANEOUS BIOPSY LUNG  2/9/2022    CT GUIDED PERCUTANEOUS BIOPSY LUNG 2/9/2022 [facility]    OTHER SURGICAL HISTORY  02/13/2020    Hernia repair    OTHER SURGICAL HISTORY  02/13/2020    Knee surgery    OTHER SURGICAL HISTORY  02/13/2020    Trigger finger repair    OTHER SURGICAL HISTORY   01/13/2020    Craniotomy    OTHER SURGICAL HISTORY  01/13/2020    Carpal tunnel surgery    OTHER SURGICAL HISTORY  10/27/2020    Sinus surgery    OTHER SURGICAL HISTORY  02/16/2022    Cataract surgery    OTHER SURGICAL HISTORY  01/26/2021    Nerve biospy     Social History     Tobacco Use    Smoking status: Never     Passive exposure: Past    Smokeless tobacco: Never   Substance Use Topics    Alcohol use: Yes     Comment: rare     family history includes Dementia in his maternal grandmother and mother; Diabetes type I in his son; Heart attack in his father; Liver cancer in his father; Macular degeneration in his father; Scoliosis in his mother; peripheral neuropathy in his father.    Current Outpatient Medications:     acetaminophen (TYLENOL ORAL), Take 2 tablets by mouth if needed (Mild pain)., Disp: , Rfl:     aspirin 81 mg EC tablet, Take 1 tablet (81 mg) by mouth once daily., Disp: , Rfl:     carbidopa-levodopa (Sinemet)  mg tablet, 1.5 tabs at 8am / 1.5 tabs at 12pm / 1.5 tabs at 4pm and 1 tab at 8pm, Disp: 450 tablet, Rfl: 3    carboxymethylcellulose (Refresh Plus) 0.5 % ophthalmic solution, Administer into affected eye(s) 4 times a day., Disp: , Rfl:     diclofenac sodium (Voltaren) 1 % gel gel, Apply 4.5 inches (4 g) topically 4 times a day.  APPLY TO LOWER EXTREMITIES. DO NOT APPLY MORE THAN 16 GM DAILY TO ANY ONE AFFECTED JOINT., Disp: , Rfl:     diphenhydramine HCl (BENADRYL ORAL), Take 1 tablet by mouth if needed (Seasonal allergies)., Disp: , Rfl:     docusate sodium (Colace) 100 mg capsule, Take 2 capsules (200 mg) by mouth once daily., Disp: , Rfl:     fluticasone (Flonase) 50 mcg/actuation nasal spray, Administer 2 sprays into each nostril if needed., Disp: , Rfl:     ibuprofen 600 mg tablet, Take 1 tablet by mouth every 6 hours if needed for mild pain (1 - 3). States takes 200 mg, Disp: , Rfl:     immune globul G-gly-IgA avg 46 (Gamunex-C) 20 gram/200 mL (10 %) solution, Infuse 70 gm divided  over 1 days every 3 weeks x 6 months. Premedicate with 650 mg acetaminophen PO, 25 mg diphenhydramine PO, and 100 mg hydrocortisone IV push., Disp: , Rfl:     ketoconazole (NIZOral) 2 % cream, Apply twice daily to affected areas of face, Disp: 60 g, Rfl: 11    ketoconazole (NIZOral) 2 % shampoo, Ketoconazole 2 % External Shampoo Quantity: 120  Refills: 0  Start: 15-Sep-2021, Disp: , Rfl:     melatonin 5 mg tablet, Take 1 tablet (5 mg) by mouth once daily at bedtime., Disp: , Rfl:     multivitamin tablet, Take 1 tablet by mouth once daily. Take with food, Disp: , Rfl:     polyethylene glycol (Miralax) 17 gram/dose powder, Take 17 g by mouth once daily., Disp: , Rfl:     predniSONE (Deltasone) 5 mg tablet, Take 1 tablet (5 mg) by mouth once daily., Disp: 90 tablet, Rfl: 3    Restasis 0.05 % ophthalmic emulsion, Administer 1 drop into both eyes every 12 hours., Disp: 180 each, Rfl: 3    rosuvastatin (Crestor) 5 mg tablet, Take 1 tablet (5 mg) by mouth once daily., Disp: 90 tablet, Rfl: 3    sertraline (Zoloft) 25 mg tablet, Take 1 tablet (25 mg) by mouth once daily., Disp: 90 tablet, Rfl: 3  Allergies   Allergen Reactions    Oxycodone Other and Hallucinations     Psychosis    Penicillin V Other     There were no vitals taken for this visit.  Neurological Exam/Physical Exam:    Constitutional: General appearance: no acute distress. Pleasant.   Auscultation of Heart: Regular rate and rhythm, no murmurs, normal S1 and S2.   Carotid Arteries: Intact without any bruits   Peripheral Vascular Exam: Pulses +2 and equal in all extremities. No swelling, edema or tenderness to palpations   Mental status: The patient was in no distress  Orientation: oriented to person, oriented to place and oriented to time.   Soft voice.   Fund of knowledge: Patient displays adequate knowledge of current events  Eyes: The ophthalmoscopic examination was normal.   Cranial nerve II: Visual fields full to confrontation.   Cranial nerves III, IV, and  VI: Pupils round, equally reactive to light; no ptosis. EOMs intact. No nystagmus.   Cranial Nerve V: Facial sensation intact to LT bilaterally.   Cranial nerve VII: Normal and symmetric facial strength.   Cranial nerve VIII: Hearing is intact bilaterally to finger rub.  Cranial nerves IX and X: Palate elevates symmetrically.   Cranial nerve XI: Shoulder shrug and neck rotation strength are intact.   Cranial nerve XII: Tongue is midline.  Motor:   Strength is slightly decreased in hands, has flexion of some fingers, duputeryen contractures of R and L 2nd and 3rd digits.  He has evidence of spasticity as well but passive ROM of arms/ legs  Deep Tendon Reflexes: left biceps 2+ , right biceps 2+, left triceps 2+, right triceps 2+, left brachioradialis 2+, right brachioradialis 2+, left patella 2+, right patella 2+, left ankle jerk 1+, right ankle jerk 1+   Plantar Reflex: Toes downgoing to plantar stimulation on the left. Toes downgoing to plantar stimulation on the right.   Sensory Exam: decreased vibration and LT in both legs up to knees b/l.  Coordination: impaired.   Gait:  more freezing of gait. Left leg slightly slower. Very difficult to get up from a chair.        Labs:    CBC:   Lab Results   Component Value Date    WBC 5.9 09/26/2024    HGB 13.0 (L) 09/26/2024    HCT 38.1 (L) 09/26/2024     09/26/2024     BMP:   Lab Results   Component Value Date     09/26/2024    K 4.0 09/26/2024     09/26/2024    CO2 30 09/26/2024    BUN 12 09/26/2024    CREATININE 0.75 09/26/2024    CALCIUM 8.7 09/26/2024    MG 2.01 12/28/2020    PHOS 2.4 (L) 12/28/2020     LFT:   Lab Results   Component Value Date    ALKPHOS 67 09/26/2024    BILITOT 0.6 09/26/2024    BILIDIR 0.3 12/28/2020    PROT 7.0 09/26/2024    ALBUMIN 3.9 09/26/2024    ALT 5 (L) 09/26/2024    AST 16 09/26/2024         Assessment/Plan   Problem List Items Addressed This Visit    None     atypical features. 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   Spasticity and hallucinations /confusion are worse.   Steroid may be causing more psychosis and confusion/sleep issues.     sinemet- 1.5 tabs at 8am / 1.5 tabs at 12pm / 1.5 tabs at 4pm and 1 tab at 8pm - has had rigidity improvement with levodopa.     Plan is to trial Nuplazid and psychiatry referral.   Consider increase Sertraline for anxiety.   PT for walking.   I think botox may cause his hand weakness to be worse.     CIPD neuropathy, hx of brain bleed as well.   This is a chronic neurologic condition that requires ongoing care and monitoring. This is a complex, serious condition that needs long term care going forward. Between myself and the patient we will be changing direction of care depending on responses to treatment.   Today we discussed medication options, non medication options for management and various other symptoms that are in relation to this disease.  I will continue to be involved in the care of this patient.

## 2024-10-11 ENCOUNTER — HOME CARE VISIT (OUTPATIENT)
Dept: HOME HEALTH SERVICES | Facility: HOME HEALTH | Age: 79
End: 2024-10-11
Payer: MEDICARE

## 2024-10-11 PROCEDURE — G0151 HHCP-SERV OF PT,EA 15 MIN: HCPCS | Mod: HHH

## 2024-10-11 SDOH — ECONOMIC STABILITY: HOUSING INSECURITY: HOME SAFETY: NO CHANGE

## 2024-10-14 ENCOUNTER — HOME CARE VISIT (OUTPATIENT)
Dept: HOME HEALTH SERVICES | Facility: HOME HEALTH | Age: 79
End: 2024-10-14
Payer: MEDICARE

## 2024-10-14 PROCEDURE — G0151 HHCP-SERV OF PT,EA 15 MIN: HCPCS | Mod: HHH

## 2024-10-14 ASSESSMENT — ENCOUNTER SYMPTOMS
DENIES PAIN: 1
PERSON REPORTING PAIN: PATIENT

## 2024-10-14 ASSESSMENT — ACTIVITIES OF DAILY LIVING (ADL)
AMBULATION ASSISTANCE ON FLAT SURFACES: 1
AMBULATION_DISTANCE/DURATION_TOLERATED: HOUSEHOLD

## 2024-10-15 ENCOUNTER — HOME CARE VISIT (OUTPATIENT)
Dept: HOME HEALTH SERVICES | Facility: HOME HEALTH | Age: 79
End: 2024-10-15
Payer: MEDICARE

## 2024-10-15 PROCEDURE — G0152 HHCP-SERV OF OT,EA 15 MIN: HCPCS | Mod: HHH

## 2024-10-15 ASSESSMENT — ENCOUNTER SYMPTOMS
SUBJECTIVE PAIN PROGRESSION: WAXING AND WANING
LOWEST PAIN SEVERITY IN PAST 24 HOURS: 4/10
PAIN: 1
HIGHEST PAIN SEVERITY IN PAST 24 HOURS: 6/10
PAIN SEVERITY GOAL: 2/10
DOUBLE VISION: 0

## 2024-10-16 ENCOUNTER — HOME CARE VISIT (OUTPATIENT)
Dept: HOME HEALTH SERVICES | Facility: HOME HEALTH | Age: 79
End: 2024-10-16
Payer: MEDICARE

## 2024-10-16 PROCEDURE — G0151 HHCP-SERV OF PT,EA 15 MIN: HCPCS | Mod: HHH

## 2024-10-16 SDOH — ECONOMIC STABILITY: HOUSING INSECURITY: HOME SAFETY: NO CHANGE

## 2024-10-18 ENCOUNTER — HOME CARE VISIT (OUTPATIENT)
Dept: HOME HEALTH SERVICES | Facility: HOME HEALTH | Age: 79
End: 2024-10-18
Payer: MEDICARE

## 2024-10-18 PROCEDURE — G0152 HHCP-SERV OF OT,EA 15 MIN: HCPCS | Mod: HHH

## 2024-10-18 ASSESSMENT — ENCOUNTER SYMPTOMS: DENIES PAIN: 1

## 2024-10-21 ENCOUNTER — HOME CARE VISIT (OUTPATIENT)
Dept: HOME HEALTH SERVICES | Facility: HOME HEALTH | Age: 79
End: 2024-10-21
Payer: MEDICARE

## 2024-10-23 ENCOUNTER — HOME CARE VISIT (OUTPATIENT)
Dept: HOME HEALTH SERVICES | Facility: HOME HEALTH | Age: 79
End: 2024-10-23
Payer: MEDICARE

## 2024-10-23 PROCEDURE — G0152 HHCP-SERV OF OT,EA 15 MIN: HCPCS | Mod: HHH

## 2024-10-23 ASSESSMENT — ENCOUNTER SYMPTOMS
DENIES PAIN: 1
PERSON REPORTING PAIN: PATIENT

## 2024-10-24 ENCOUNTER — TELEPHONE (OUTPATIENT)
Dept: NEUROLOGY | Facility: CLINIC | Age: 79
End: 2024-10-24
Payer: MEDICARE

## 2024-10-24 ENCOUNTER — HOSPITAL ENCOUNTER (OUTPATIENT)
Dept: CARDIOLOGY | Facility: CLINIC | Age: 79
Discharge: HOME | End: 2024-10-24
Payer: MEDICARE

## 2024-10-24 ENCOUNTER — HOME CARE VISIT (OUTPATIENT)
Dept: HOME HEALTH SERVICES | Facility: HOME HEALTH | Age: 79
End: 2024-10-24
Payer: MEDICARE

## 2024-10-24 DIAGNOSIS — R07.89 CHEST TIGHTNESS: Primary | ICD-10-CM

## 2024-10-24 DIAGNOSIS — R07.89 CHEST TIGHTNESS: ICD-10-CM

## 2024-10-24 PROCEDURE — 93010 ELECTROCARDIOGRAM REPORT: CPT | Performed by: INTERNAL MEDICINE

## 2024-10-24 PROCEDURE — G0151 HHCP-SERV OF PT,EA 15 MIN: HCPCS | Mod: HHH

## 2024-10-24 PROCEDURE — 93005 ELECTROCARDIOGRAM TRACING: CPT

## 2024-10-24 ASSESSMENT — ENCOUNTER SYMPTOMS
PAIN LOCATION: LEFT KNEE
PERSON REPORTING PAIN: PATIENT
PAIN LOCATION - EXACERBATING FACTORS: LE FATIGUE
PAIN: 1

## 2024-10-24 ASSESSMENT — ACTIVITIES OF DAILY LIVING (ADL)
AMBULATION_DISTANCE/DURATION_TOLERATED: HOUSEHOLD
AMBULATION ASSISTANCE ON FLAT SURFACES: 1

## 2024-10-24 NOTE — TELEPHONE ENCOUNTER
LM -started Nuplazid 3 days ago, last night experienced tightness in chest that lasted for about 10 min, he took 2 baby aspirin to get it to subside, they read it could be a side effect is feeling better this morning and isn't going to take again until he hears from you

## 2024-10-25 ENCOUNTER — HOME CARE VISIT (OUTPATIENT)
Dept: HOME HEALTH SERVICES | Facility: HOME HEALTH | Age: 79
End: 2024-10-25
Payer: MEDICARE

## 2024-10-25 ENCOUNTER — APPOINTMENT (OUTPATIENT)
Dept: INFUSION THERAPY | Facility: HOSPITAL | Age: 79
End: 2024-10-25
Payer: MEDICARE

## 2024-10-25 LAB
ATRIAL RATE: 76 BPM
P AXIS: 28 DEGREES
P OFFSET: 174 MS
P ONSET: 124 MS
PR INTERVAL: 182 MS
Q ONSET: 215 MS
QRS COUNT: 13 BEATS
QRS DURATION: 80 MS
QT INTERVAL: 378 MS
QTC CALCULATION(BAZETT): 425 MS
QTC FREDERICIA: 408 MS
R AXIS: -32 DEGREES
T AXIS: 16 DEGREES
T OFFSET: 404 MS
VENTRICULAR RATE: 76 BPM

## 2024-10-25 PROCEDURE — G0152 HHCP-SERV OF OT,EA 15 MIN: HCPCS | Mod: HHH

## 2024-10-25 ASSESSMENT — ENCOUNTER SYMPTOMS: DENIES PAIN: 1

## 2024-10-29 ENCOUNTER — HOME CARE VISIT (OUTPATIENT)
Dept: HOME HEALTH SERVICES | Facility: HOME HEALTH | Age: 79
End: 2024-10-29
Payer: MEDICARE

## 2024-10-29 PROCEDURE — G0152 HHCP-SERV OF OT,EA 15 MIN: HCPCS | Mod: HHH

## 2024-10-30 ENCOUNTER — HOME CARE VISIT (OUTPATIENT)
Dept: HOME HEALTH SERVICES | Facility: HOME HEALTH | Age: 79
End: 2024-10-30
Payer: MEDICARE

## 2024-11-01 ENCOUNTER — HOME CARE VISIT (OUTPATIENT)
Dept: HOME HEALTH SERVICES | Facility: HOME HEALTH | Age: 79
End: 2024-11-01
Payer: MEDICARE

## 2024-11-01 VITALS — DIASTOLIC BLOOD PRESSURE: 68 MMHG | HEART RATE: 102 BPM | SYSTOLIC BLOOD PRESSURE: 98 MMHG | OXYGEN SATURATION: 96 %

## 2024-11-01 PROCEDURE — G0152 HHCP-SERV OF OT,EA 15 MIN: HCPCS | Mod: HHH

## 2024-11-01 PROCEDURE — G0151 HHCP-SERV OF PT,EA 15 MIN: HCPCS | Mod: HHH

## 2024-11-01 SDOH — HEALTH STABILITY: PHYSICAL HEALTH: PHYSICAL EXERCISE: STANDING

## 2024-11-01 SDOH — HEALTH STABILITY: PHYSICAL HEALTH: PHYSICAL EXERCISE: SEATED

## 2024-11-01 SDOH — HEALTH STABILITY: PHYSICAL HEALTH: RESISTANCE: NA-X5 LAPS

## 2024-11-01 SDOH — HEALTH STABILITY: PHYSICAL HEALTH

## 2024-11-01 SDOH — HEALTH STABILITY: PHYSICAL HEALTH: EXERCISE ACTIVITY: SIDESTEPPING @ COUNTER

## 2024-11-01 SDOH — HEALTH STABILITY: PHYSICAL HEALTH: RESISTANCE: NA-30"X3 W STRETCH STRAP

## 2024-11-01 SDOH — HEALTH STABILITY: PHYSICAL HEALTH: EXERCISE ACTIVITY: SCAPULAR RETRACTION

## 2024-11-01 SDOH — HEALTH STABILITY: PHYSICAL HEALTH: PHYSICAL EXERCISE: 10

## 2024-11-01 SDOH — HEALTH STABILITY: PHYSICAL HEALTH: EXERCISE ACTIVITIES SETS: 3

## 2024-11-01 SDOH — HEALTH STABILITY: PHYSICAL HEALTH: EXERCISE ACTIVITY: HAMSTRING CURLS

## 2024-11-01 SDOH — HEALTH STABILITY: PHYSICAL HEALTH: EXERCISE ACTIVITY: HAMSTRING/CALF STRETCH

## 2024-11-01 ASSESSMENT — ENCOUNTER SYMPTOMS
PERSON REPORTING PAIN: PATIENT
PAIN: 1
PAIN SEVERITY GOAL: 0/10
LOWEST PAIN SEVERITY IN PAST 24 HOURS: 2/10
PERSON REPORTING PAIN: PATIENT
HIGHEST PAIN SEVERITY IN PAST 24 HOURS: 5/10
PAIN LOCATION - PAIN SEVERITY: 5/10
PAIN LOCATION: LEFT HIP
DENIES PAIN: 1

## 2024-11-04 ENCOUNTER — HOME CARE VISIT (OUTPATIENT)
Dept: HOME HEALTH SERVICES | Facility: HOME HEALTH | Age: 79
End: 2024-11-04
Payer: MEDICARE

## 2024-11-04 PROCEDURE — G0151 HHCP-SERV OF PT,EA 15 MIN: HCPCS | Mod: HHH

## 2024-11-04 PROCEDURE — G0152 HHCP-SERV OF OT,EA 15 MIN: HCPCS | Mod: HHH

## 2024-11-04 SDOH — ECONOMIC STABILITY: HOUSING INSECURITY: HOME SAFETY: NO CHANGE

## 2024-11-04 ASSESSMENT — ACTIVITIES OF DAILY LIVING (ADL)
DRESSING_LB_CURRENT_FUNCTION: MINIMUM ASSIST
GROOMING ASSESSED: 1
AMBULATION ASSISTANCE: CONTACT GUARD ASSIST
GROOMING_CURRENT_FUNCTION: MINIMUM ASSIST
FEEDING ASSESSED: 1
TOILETING: 1
BATHING_CURRENT_FUNCTION: MODERATE ASSIST
AMBULATION ASSISTANCE: 1
BATHING_CURRENT_FUNCTION: ONE PERSON
TOILETING: MAXIMUM ASSIST
BATHING ASSESSED: 1
TOILETING: SUPERVISION
FEEDING: MINIMUM ASSIST
BATHING_CURRENT_FUNCTION: SUPERVISION
AMBULATION ASSISTANCE: MINIMUM ASSIST
DRESSING_UB_CURRENT_FUNCTION: SUPERVISION
AMBULATION_DISTANCE/DURATION_TOLERATED: HOUSEHOLD
AMBULATION ASSISTANCE ON FLAT SURFACES: 1

## 2024-11-04 ASSESSMENT — ENCOUNTER SYMPTOMS
PERSON REPORTING PAIN: PATIENT
DENIES PAIN: 1

## 2024-11-06 ENCOUNTER — TELEPHONE (OUTPATIENT)
Dept: NEUROLOGY | Facility: CLINIC | Age: 79
End: 2024-11-06
Payer: MEDICARE

## 2024-11-06 ENCOUNTER — HOME CARE VISIT (OUTPATIENT)
Dept: HOME HEALTH SERVICES | Facility: HOME HEALTH | Age: 79
End: 2024-11-06
Payer: MEDICARE

## 2024-11-06 PROCEDURE — G0151 HHCP-SERV OF PT,EA 15 MIN: HCPCS | Mod: HHH

## 2024-11-06 ASSESSMENT — ACTIVITIES OF DAILY LIVING (ADL)
ENTERING_EXITING_HOME: CONTACT GUARD ASSIST
AMBULATION ASSISTANCE ON FLAT SURFACES: 1
OASIS_M1830: 03
AMBULATION_DISTANCE/DURATION_TOLERATED: HOUSEHOLD

## 2024-11-06 ASSESSMENT — GAIT ASSESSMENTS
PATH SCORE: 1
BALANCE AND GAIT SCORE: 6
TRUNK SCORE: 0
STEP SYMMETRY: 0 - RIGHT AND LEFT STEP LENGTH NOT EQUAL
WALKING STANCE: 0 - HEELS APART
GAIT SCORE: 1
TRUNK: 0 - MARKED SWAY OR USES WALKING AID
INITIATION OF GAIT IMMEDIATELY AFTER GO: 0 - ANY HESITANCY OR MULTIPLE ATTEMPTS TO START
PATH: 1 - MILD/MODERATE DEVIATION OR USES WALKING AID
STEP CONTINUITY: 0 - STOPPING OR DISCONTINUITY BETWEEN STEPS

## 2024-11-06 ASSESSMENT — BALANCE ASSESSMENTS
SITTING DOWN: 0 - UNSAFE (MISJUDGES DISTANCE, FALLS INTO CHAIR)
SITTING BALANCE: 1 - STEADY, SAFE
TURNING 360 DEGREES STEPS: 0 - DISCONTINUOUS STEPS
BALANCE SCORE: 5
ARISING SCORE: 1
NUDGED: 0 - BEGINS TO FALL
EYES CLOSED AT MAXIMUM POSITION NUDGED: 0 - UNSTEADY
ARISES: 1 - ABLE, USES ARMS TO HELP
IMMEDIATE STANDING BALANCE FIRST 5 SECONDS: 1 - STEADY BUT USES WALKER OR OTHER SUPPORT
NUDGED SCORE: 0
STANDING BALANCE: 1 - STEADY BUT WIDE STANCE AND USES CANE OR OTHER SUPPORT
ATTEMPTS TO ARISE: 1 - ABLE, REQUIRES MORE THAN ONE ATTEMPT

## 2024-11-06 ASSESSMENT — ENCOUNTER SYMPTOMS
OCCASIONAL FEELINGS OF UNSTEADINESS: 1
PERSON REPORTING PAIN: PATIENT
DENIES PAIN: 1

## 2024-11-06 NOTE — TELEPHONE ENCOUNTER
Wife called in, Donato stopped his Nuplazid last week due to chest pain, had a EKG a week ago, were you able to review the results? Should patient start the Nuplazid and would like to speak to you.

## 2024-11-07 ENCOUNTER — HOME CARE VISIT (OUTPATIENT)
Dept: HOME HEALTH SERVICES | Facility: HOME HEALTH | Age: 79
End: 2024-11-07
Payer: MEDICARE

## 2024-11-07 PROCEDURE — G0152 HHCP-SERV OF OT,EA 15 MIN: HCPCS | Mod: HHH

## 2024-11-09 ASSESSMENT — ENCOUNTER SYMPTOMS
PAIN LOCATION: BACK
PERSON REPORTING PAIN: PATIENT
PAIN: 1

## 2024-11-12 ENCOUNTER — HOME CARE VISIT (OUTPATIENT)
Dept: HOME HEALTH SERVICES | Facility: HOME HEALTH | Age: 79
End: 2024-11-12
Payer: MEDICARE

## 2024-11-12 PROCEDURE — G0151 HHCP-SERV OF PT,EA 15 MIN: HCPCS | Mod: HHH

## 2024-11-12 PROCEDURE — G0152 HHCP-SERV OF OT,EA 15 MIN: HCPCS | Mod: HHH

## 2024-11-12 SDOH — ECONOMIC STABILITY: HOUSING INSECURITY: HOME SAFETY: NO CHANGE

## 2024-11-12 ASSESSMENT — ENCOUNTER SYMPTOMS
PERSON REPORTING PAIN: PATIENT
DENIES PAIN: 1

## 2024-11-13 ENCOUNTER — APPOINTMENT (OUTPATIENT)
Dept: RADIOLOGY | Facility: HOSPITAL | Age: 79
End: 2024-11-13
Payer: MEDICARE

## 2024-11-13 ENCOUNTER — APPOINTMENT (OUTPATIENT)
Dept: RADIATION ONCOLOGY | Facility: HOSPITAL | Age: 79
End: 2024-11-13
Payer: MEDICARE

## 2024-11-14 ENCOUNTER — HOME CARE VISIT (OUTPATIENT)
Dept: HOME HEALTH SERVICES | Facility: HOME HEALTH | Age: 79
End: 2024-11-14
Payer: MEDICARE

## 2024-11-14 PROCEDURE — G0151 HHCP-SERV OF PT,EA 15 MIN: HCPCS | Mod: HHH

## 2024-11-14 SDOH — ECONOMIC STABILITY: HOUSING INSECURITY

## 2024-11-14 ASSESSMENT — ENCOUNTER SYMPTOMS
PAIN: 1
PAIN LOCATION - PAIN DURATION: SHORT
PAIN LOCATION: LEFT KNEE
PAIN LOCATION - PAIN FREQUENCY: INTERMITTENT
PAIN LOCATION - PAIN QUALITY: GRINDING
PERSON REPORTING PAIN: PATIENT
PAIN LOCATION - PAIN SEVERITY: 8/10

## 2024-11-14 ASSESSMENT — ACTIVITIES OF DAILY LIVING (ADL)
AMBULATION ASSISTANCE ON FLAT SURFACES: 1
AMBULATION_DISTANCE/DURATION_TOLERATED: HOUSEHOLD

## 2024-11-15 ENCOUNTER — APPOINTMENT (OUTPATIENT)
Dept: INFUSION THERAPY | Facility: HOSPITAL | Age: 79
End: 2024-11-15
Payer: MEDICARE

## 2024-11-18 ENCOUNTER — HOME CARE VISIT (OUTPATIENT)
Dept: HOME HEALTH SERVICES | Facility: HOME HEALTH | Age: 79
End: 2024-11-18
Payer: MEDICARE

## 2024-11-18 PROCEDURE — G0151 HHCP-SERV OF PT,EA 15 MIN: HCPCS | Mod: HHH

## 2024-11-18 PROCEDURE — G0152 HHCP-SERV OF OT,EA 15 MIN: HCPCS | Mod: HHH

## 2024-11-18 SDOH — ECONOMIC STABILITY: HOUSING INSECURITY: HOME SAFETY: NO CHANGE

## 2024-11-18 ASSESSMENT — ENCOUNTER SYMPTOMS
PERSON REPORTING PAIN: PATIENT
SUBJECTIVE PAIN PROGRESSION: UNCHANGED
PAIN: 1
PERSON REPORTING PAIN: PATIENT
DENIES PAIN: 1
PAIN LOCATION: LEFT KNEE

## 2024-11-19 ENCOUNTER — APPOINTMENT (OUTPATIENT)
Dept: PRIMARY CARE | Facility: CLINIC | Age: 79
End: 2024-11-19
Payer: MEDICARE

## 2024-11-19 VITALS
HEART RATE: 80 BPM | WEIGHT: 161.4 LBS | BODY MASS INDEX: 23.91 KG/M2 | DIASTOLIC BLOOD PRESSURE: 81 MMHG | OXYGEN SATURATION: 98 % | TEMPERATURE: 97.7 F | SYSTOLIC BLOOD PRESSURE: 119 MMHG | HEIGHT: 69 IN

## 2024-11-19 DIAGNOSIS — I25.10 CORONARY ARTERY DISEASE INVOLVING NATIVE CORONARY ARTERY OF NATIVE HEART WITHOUT ANGINA PECTORIS: ICD-10-CM

## 2024-11-19 DIAGNOSIS — M17.12 OSTEOARTHRITIS OF LEFT KNEE, UNSPECIFIED OSTEOARTHRITIS TYPE: Primary | ICD-10-CM

## 2024-11-19 PROCEDURE — 1160F RVW MEDS BY RX/DR IN RCRD: CPT | Performed by: STUDENT IN AN ORGANIZED HEALTH CARE EDUCATION/TRAINING PROGRAM

## 2024-11-19 PROCEDURE — 1159F MED LIST DOCD IN RCRD: CPT | Performed by: STUDENT IN AN ORGANIZED HEALTH CARE EDUCATION/TRAINING PROGRAM

## 2024-11-19 PROCEDURE — 99213 OFFICE O/P EST LOW 20 MIN: CPT | Performed by: STUDENT IN AN ORGANIZED HEALTH CARE EDUCATION/TRAINING PROGRAM

## 2024-11-19 PROCEDURE — 1157F ADVNC CARE PLAN IN RCRD: CPT | Performed by: STUDENT IN AN ORGANIZED HEALTH CARE EDUCATION/TRAINING PROGRAM

## 2024-11-19 PROCEDURE — 1036F TOBACCO NON-USER: CPT | Performed by: STUDENT IN AN ORGANIZED HEALTH CARE EDUCATION/TRAINING PROGRAM

## 2024-11-19 PROCEDURE — 1124F ACP DISCUSS-NO DSCNMKR DOCD: CPT | Performed by: STUDENT IN AN ORGANIZED HEALTH CARE EDUCATION/TRAINING PROGRAM

## 2024-11-19 RX ORDER — SODIUM FLUORIDE 6 MG/ML
PASTE, DENTIFRICE DENTAL
COMMUNITY
Start: 2024-11-12

## 2024-11-19 RX ORDER — HUMAN IMMUNOGLOBULIN G 5 G/50ML
SOLUTION INTRAVENOUS
COMMUNITY
Start: 2024-11-09

## 2024-11-19 RX ORDER — ROSUVASTATIN CALCIUM 5 MG/1
5 TABLET, COATED ORAL DAILY
Qty: 90 TABLET | Refills: 3 | Status: SHIPPED | OUTPATIENT
Start: 2024-11-19

## 2024-11-19 ASSESSMENT — PATIENT HEALTH QUESTIONNAIRE - PHQ9
2. FEELING DOWN, DEPRESSED OR HOPELESS: NOT AT ALL
1. LITTLE INTEREST OR PLEASURE IN DOING THINGS: NOT AT ALL
SUM OF ALL RESPONSES TO PHQ9 QUESTIONS 1 & 2: 0

## 2024-11-19 ASSESSMENT — ENCOUNTER SYMPTOMS
SHORTNESS OF BREATH: 0
FEVER: 0
ARTHRALGIAS: 1
CHILLS: 0

## 2024-11-19 NOTE — PROGRESS NOTES
"  Assessment/Plan   Problem List Items Addressed This Visit             ICD-10-CM    Coronary artery disease involving native coronary artery of native heart without angina pectoris I25.10    Relevant Medications    rosuvastatin (Crestor) 5 mg tablet     Other Visit Diagnoses         Codes    Osteoarthritis of left knee, unspecified osteoarthritis type    -  Primary M17.12          Patient will continue to discuss options with orthopedist  Can send cardiology referral if patient does decide to pursue surgery.   Subjective   Patient ID: Wade Montgomery \"Donato\" is a 79 y.o. male who presents for Transfer Of Care (From Dr. Bro/) and Follow-up.  HPI  Patient has had issues with his L knee, it has been giving out on him and has been more painful.   His orthopedic surgeon had asked patient if PCP would conisder him to be a surgical candidate for arthroplasty.   Patient does have multiple medical comorbidities including history secondary lung cancer, bladder cancer, chronic inflammatorily demyelinating polyneuritis and polyneuropathy, Parkinsonism, CAD that would increase surgical risk and also could lead to other complications like difficulty with PT after surgery.     Discussed while I do believe he could be potential candidate, would also need cardiac clearance by cardiologist prior and would need to think about the above potential risks prior to proceeding.     Review of Systems   Constitutional:  Negative for chills and fever.   HENT:  Negative for congestion.    Respiratory:  Negative for shortness of breath.    Cardiovascular:  Negative for chest pain.   Musculoskeletal:  Positive for arthralgias and gait problem.       Objective   Physical Exam  Constitutional:       Appearance: Normal appearance.   Cardiovascular:      Rate and Rhythm: Normal rate and regular rhythm.   Pulmonary:      Effort: Pulmonary effort is normal.      Breath sounds: Normal breath sounds.   Neurological:      Mental Status: He is " alert.              Braydon Austin MD 11/19/24 11:32 AM

## 2024-11-20 ENCOUNTER — OFFICE VISIT (OUTPATIENT)
Dept: DERMATOLOGY | Facility: CLINIC | Age: 79
End: 2024-11-20
Payer: MEDICARE

## 2024-11-20 ENCOUNTER — APPOINTMENT (OUTPATIENT)
Dept: PRIMARY CARE | Facility: CLINIC | Age: 79
End: 2024-11-20
Payer: MEDICARE

## 2024-11-20 DIAGNOSIS — L57.0 ACTINIC KERATOSIS: ICD-10-CM

## 2024-11-20 DIAGNOSIS — L57.8 DIFFUSE PHOTODAMAGE OF SKIN: ICD-10-CM

## 2024-11-20 DIAGNOSIS — L82.0 INFLAMED SEBORRHEIC KERATOSIS: Primary | ICD-10-CM

## 2024-11-20 DIAGNOSIS — L82.1 SEBORRHEIC KERATOSIS: ICD-10-CM

## 2024-11-20 DIAGNOSIS — L21.9 SEBORRHEIC DERMATITIS: ICD-10-CM

## 2024-11-20 DIAGNOSIS — D48.5 NEOPLASM OF UNCERTAIN BEHAVIOR OF SKIN: ICD-10-CM

## 2024-11-20 DIAGNOSIS — D22.5 MELANOCYTIC NEVUS OF TRUNK: ICD-10-CM

## 2024-11-20 DIAGNOSIS — Z85.828 HISTORY OF NONMELANOMA SKIN CANCER: ICD-10-CM

## 2024-11-20 NOTE — PROGRESS NOTES
"Subjective     Wade Montgomery \"Donato\" is a 79 y.o. male who presents for the following: Skin Check.  He notes a brown, raised, rough bump on his left upper cheek, which has been present for several months and has been itching recently.  It has not changed in any other way, including in size, shape, or color, and it does not hurt or bleed.  He also notes a dry, flaky scalp.  He denies any other new, changing, or concerning skin lesions since his last visit; no bleeding, itching, or burning lesions.      Review of Systems:  No other skin or systemic complaints other than what is documented elsewhere in the note.    The following portions of the chart were reviewed this encounter and updated as appropriate:       Skin Cancer History  No skin cancer on file.    Specialty Problems          Dermatology Problems    Personal history of other malignant neoplasm of skin    Actinic keratosis    Basal cell carcinoma of skin of other parts of face    Basal cell carcinoma of skin of right upper limb, including shoulder    Carcinoma in situ of skin of scalp and neck    Hemangioma of skin and subcutaneous tissue    Inflamed seborrheic keratosis    Lichen simplex chronicus    Melanocytic nevi of other parts of face    Melanocytic nevi of scalp and neck    Melanocytic nevi of trunk    Melanocytic nevi of unspecified lower limb, including hip    Melanocytic nevi of unspecified upper limb, including shoulder    Neoplasm of uncertain behavior of skin    Other seborrheic keratosis    Scar condition and fibrosis of skin    Seborrheic dermatitis, unspecified    Skin changes due to chronic exposure to nonionizing radiation, unspecified    Xerosis cutis    SCC (squamous cell carcinoma)     SCC SERIES            Past Dermatologic / Past Relevant Medical History:    - history of SCC in situ on right lateral neck diagnosed on 3/16/22 s/p Mohs surgery by Dr. Sims on 3/31/22  - BCC on left lateral eyebrow diagnosed on 3/4/20 s/p Mohs surgery " by Dr. Sawyer on 3/31/20  - BCC on right distal shoulder diagnosed on 3/19/19 s/p ED&C on 4/17/19  - AKs  - mildly dysplastic junctional nevus on mid-back on 3/4/20  - biopsy-proven lichen simplex chronicus on left lateral scrotum as above  - Bladder cancer s/p radical cystectomy, for which he follows with Dr. Jeremiah Gomez in Urology  - CIDP  - no history of eczema, psoriasis, or melanoma     Family History:    Sister - skin cancer of unknown type  No family history of eczema or psoriasis    Social History:    The patient is retired from working in [employer] and then [employer]    Allergies:  Oxycodone and Penicillin v    Current Medications / CAM's:    Current Outpatient Medications:     acetaminophen (TYLENOL ORAL), Take 2 tablets by mouth if needed (Mild pain)., Disp: , Rfl:     aspirin 81 mg EC tablet, Take 1 tablet (81 mg) by mouth once daily., Disp: , Rfl:     carbidopa-levodopa (Sinemet)  mg tablet, 1.5 tabs at 8am / 1.5 tabs at 12pm / 1.5 tabs at 4pm and 1 tab at 8pm, Disp: 450 tablet, Rfl: 3    carboxymethylcellulose (Refresh Plus) 0.5 % ophthalmic solution, Administer into affected eye(s) 4 times a day., Disp: , Rfl:     diclofenac sodium (Voltaren) 1 % gel gel, Apply 4.5 inches (4 g) topically 4 times a day.  APPLY TO LOWER EXTREMITIES. DO NOT APPLY MORE THAN 16 GM DAILY TO ANY ONE AFFECTED JOINT., Disp: , Rfl:     diphenhydramine HCl (BENADRYL ORAL), Take 1 tablet by mouth if needed (Seasonal allergies)., Disp: , Rfl:     docusate sodium (Colace) 100 mg capsule, Take 2 capsules (200 mg) by mouth once daily., Disp: , Rfl:     fluoride, sodium, (Prevident 5000 Booster) 1.1 % dental paste, USE TO BRUSH ON TEETH IN PLACE OF TOOTHPASTE TWICE A DAY., Disp: , Rfl:     fluticasone (Flonase) 50 mcg/actuation nasal spray, Administer 2 sprays into each nostril if needed., Disp: , Rfl:     Gammaplex 10 % solution, , Disp: , Rfl:     ibuprofen 600 mg tablet, Take 1 tablet (600 mg) by mouth every 6 hours if  needed for mild pain (1 - 3). States takes 200 mg, Disp: , Rfl:     immune globul G-gly-IgA avg 46 (Gamunex-C) 20 gram/200 mL (10 %) solution, Infuse 70 gm divided over 1 days every 3 weeks x 6 months. Premedicate with 650 mg acetaminophen PO, 25 mg diphenhydramine PO, and 100 mg hydrocortisone IV push., Disp: , Rfl:     ketoconazole (NIZOral) 2 % cream, Apply twice daily to affected areas of face, Disp: 60 g, Rfl: 11    ketoconazole (NIZOral) 2 % shampoo, Ketoconazole 2 % External Shampoo Quantity: 120  Refills: 0  Start: 15-Sep-2021, Disp: , Rfl:     melatonin 5 mg tablet, Take 1 tablet (5 mg) by mouth once daily at bedtime., Disp: , Rfl:     multivitamin tablet, Take 1 tablet by mouth once daily. Take with food, Disp: , Rfl:     pimavanserin (Nuplazid) 34 mg capsule, Take 34 mg by mouth once daily., Disp: 90 capsule, Rfl: 3    polyethylene glycol (Miralax) 17 gram/dose powder, Mix 17 g of powder and drink once daily., Disp: , Rfl:     predniSONE (Deltasone) 5 mg tablet, Take 1 tablet (5 mg) by mouth once daily., Disp: 90 tablet, Rfl: 3    psyllium (Metamucil) 3.4 gram packet, Take 1 packet by mouth once daily., Disp: , Rfl:     Restasis 0.05 % ophthalmic emulsion, Administer 1 drop into both eyes every 12 hours., Disp: 180 each, Rfl: 3    rosuvastatin (Crestor) 5 mg tablet, Take 1 tablet (5 mg) by mouth once daily., Disp: 90 tablet, Rfl: 3    sertraline (Zoloft) 25 mg tablet, Take 1 tablet (25 mg) by mouth once daily., Disp: 90 tablet, Rfl: 3     Objective   Well appearing patient in no apparent distress; mood and affect are within normal limits.    A full examination was performed including scalp, face, eyes, ears, nose, lips, neck, chest, axillae, abdomen, back, bilateral upper extremities, and bilateral lower extremities. All findings within normal limits unless otherwise noted below.    Assessment/Plan   1. Inflamed seborrheic keratosis  Left Lateral Upper Cheek  On the patient's left lateral upper cheek,  there is an 8 mm erythematous and light brown-colored, hyperkeratotic, stuck-on appearing papule with a surrounding rim of erythema    Inflamed Seborrheic Keratosis - left lateral upper cheek.  The benign nature of this lesion was discussed with the patient today and reassurance provided.  Given the history the patient provides of frequent irritation and associated symptoms as well as its inflamed appearance on exam today, we offered to treat this lesion with liquid nitrogen cryotherapy.  The patient expressed understanding, is in agreement with this plan, and wishes to proceed with cryotherapy today.    Destr of lesion - Left Lateral Upper Cheek  Complexity: simple    Destruction method: cryotherapy    Informed consent: discussed and consent obtained    Lesion destroyed using liquid nitrogen: Yes    Cryotherapy cycles:  2  Outcome: patient tolerated procedure well with no complications    Post-procedure details: wound care instructions given      2. Neoplasm of uncertain behavior of skin  Right Anterior Temple  6 mm pink, shiny papule           Shave removal    Lesion length (cm):  0.6  Lesion width (cm):  0.6  Margin per side (cm):  0  Lesion diameter (cm):  0.6  Informed consent: discussed and consent obtained    Timeout: patient name, date of birth, surgical site, and procedure verified    Procedure prep:  Patient was prepped and draped  Anesthesia: the lesion was anesthetized in a standard fashion    Anesthetic:  1% lidocaine w/ epinephrine 1-100,000 local infiltration  Instrument used: flexible razor blade    Hemostasis achieved with: aluminum chloride    Outcome: patient tolerated procedure well    Post-procedure details: sterile dressing applied and wound care instructions given    Dressing type: bandage and petrolatum      Specimen 1 - Dermatopathology- DERM LAB  Differential Diagnosis: r/o BCC  Check Margins Yes/No?:    Comments:    Dermpath Lab: Routine Histopathology (formalin-fixed tissue)    3. Actinic  keratosis (16)  Head - Anterior (Face) (16)  Scattered on the patient's face, mainly his forehead, and scalp, there are multiple erythematous, gritty, scaly macules     Actinic Keratoses -scattered on face and scalp.  The pre-cancerous nature of these lesions and treatment options were discussed with the patient today.  At this time, we recommend treatment with liquid nitrogen cryotherapy.  The patient expressed understanding, is in agreement with this plan, and wishes to proceed with cryotherapy today.    Destr of lesion - Head - Anterior (Face) (16)  Complexity: simple    Destruction method: cryotherapy    Informed consent: discussed and consent obtained    Lesion destroyed using liquid nitrogen: Yes    Cryotherapy cycles:  1  Outcome: patient tolerated procedure well with no complications    Post-procedure details: wound care instructions given      4. Melanocytic nevus of trunk  Scattered on the patient's face, neck, trunk, and extremities, there are several small, round- to oval-shaped, brown-pigmented and pink-colored, symmetric, uniform-appearing macules and dome-shaped papules    Clinically benign- to slightly atypical-appearing nevi - the clinically benign- to slightly atypical-appearing nature of the patient's nevi was discussed with the patient today.  None of the patient's nevi meet threshold for biopsy today.  We emphasized the importance of performing monthly self-skin exams using the ABCDs of monitoring moles, which were reviewed with the patient today and an informational hand-out provided.  We also emphasized the importance of sun avoidance and sun protection with daily sunscreen use.  The patient expressed understanding and is in agreement with this plan.    5. Seborrheic keratosis  Scattered on the patient's face, neck, trunk, and extremities, there are multiple tan- to light brown-colored, hyperkeratotic, stuck-on appearing papules of varying size and shape    Seborrheic Keratoses - the benign  nature of these lesions was discussed with the patient today and reassurance provided.  No treatment is medically indicated for the non-inflamed SKs at this time.    6. Seborrheic dermatitis  Head - Anterior (Face)  On his scalp and face, mainly his glabella and bilateral eyebrows and perinasal creases, there are pink, scaly patches with whitish-yellowish, greasy scale    Seborrheic Dermatitis - flare on scalp and face.  The potentially chronic and intermittently flaring nature of this condition and treatment options were discussed extensively with the patient today.  At this time, for his face, we recommend topical anti-fungal therapy with Ketoconazole 2% cream, which the patient was instructed to apply twice daily to the affected areas of the face.  In addition, for his scalp, we recommend topical anti-fungal therapy with Ketoconazole 2% shampoo, which the patient was instructed to use 2-3 days per week, alternating with over-the-counter anti-dandruff shampoos, such as Head & Shoulders, Selsun Blue, and Neutrogena T-gel, every month.  The risks, benefits, and side effects of these medications were discussed.  The patient expressed understanding and is in agreement with this plan.    Related Medications  ketoconazole (NIZOral) 2 % cream  Apply twice daily to affected areas of face    7. History of nonmelanoma skin cancer  On the patient's right lateral neck, left lateral eyebrow, right distal shoulder, and mid back, there are well-healed scars with no evidence of recurrent growth on exam today.    History of nonmelanoma skin cancers, dysplastic nevus, and actinic keratoses and photodamage.  There is no evidence of recurrence on exam today.  The signs and symptoms of skin cancer were reviewed and the patient was advised to practice sun protection and sun avoidance, use daily sunscreen, and perform regular self skin exams.  We will have the patient return to our office in 4 to 6 months, pending the above biopsy  result, for routine follow-up and skin exam, pending the above biopsy results, and the patient was instructed to call our office should the patient notice any new, changing, symptomatic, or otherwise concerning skin lesions before then.  The patient and his wife expressed understanding and are in agreement with this plan.    8. Diffuse photodamage of skin  Photodistributed  Diffuse photodamage with actinic changes with telangiectasia and mottled pigmentation in sun-exposed areas.    Photodamage.  The signs and symptoms of skin cancer were reviewed and the patient was advised to practice sun protection and sun avoidance, use daily sunscreen, and perform regular self skin exams.  Sun protection was discussed, including avoiding the mid-day sun, wearing a sunscreen with SPF at least 50, and stressing the need for reapplication of sunscreen and applying more than they think they need.        Jer Neal DO  PGY-4 Dermatology      I saw and evaluated the patient. I personally obtained the key and critical portions of the history and physical exam or was physically present for key and critical portions performed by the resident/fellow. I reviewed the resident/fellow's documentation and discussed the patient with the resident/fellow. I agree with the resident/fellow's medical decision making as documented in the note.    Antonio Chow MD

## 2024-11-21 ENCOUNTER — HOME CARE VISIT (OUTPATIENT)
Dept: HOME HEALTH SERVICES | Facility: HOME HEALTH | Age: 79
End: 2024-11-21
Payer: MEDICARE

## 2024-11-21 ENCOUNTER — APPOINTMENT (OUTPATIENT)
Dept: NEUROLOGY | Facility: CLINIC | Age: 79
End: 2024-11-21
Payer: MEDICARE

## 2024-11-21 PROCEDURE — G0151 HHCP-SERV OF PT,EA 15 MIN: HCPCS | Mod: HHH

## 2024-11-21 SDOH — ECONOMIC STABILITY: HOUSING INSECURITY: HOME SAFETY: NO CHANGE

## 2024-11-22 ENCOUNTER — HOME CARE VISIT (OUTPATIENT)
Dept: HOME HEALTH SERVICES | Facility: HOME HEALTH | Age: 79
End: 2024-11-22
Payer: MEDICARE

## 2024-11-22 LAB
LABORATORY COMMENT REPORT: NORMAL
PATH REPORT.FINAL DX SPEC: NORMAL
PATH REPORT.GROSS SPEC: NORMAL
PATH REPORT.MICROSCOPIC SPEC OTHER STN: NORMAL
PATH REPORT.RELEVANT HX SPEC: NORMAL
PATH REPORT.TOTAL CANCER: NORMAL

## 2024-11-22 PROCEDURE — G0152 HHCP-SERV OF OT,EA 15 MIN: HCPCS | Mod: HHH

## 2024-11-22 ASSESSMENT — ENCOUNTER SYMPTOMS
PERSON REPORTING PAIN: PATIENT
DENIES PAIN: 1

## 2024-11-25 ENCOUNTER — HOME CARE VISIT (OUTPATIENT)
Dept: HOME HEALTH SERVICES | Facility: HOME HEALTH | Age: 79
End: 2024-11-25
Payer: MEDICARE

## 2024-11-25 PROCEDURE — G0152 HHCP-SERV OF OT,EA 15 MIN: HCPCS | Mod: HHH

## 2024-11-25 PROCEDURE — G0151 HHCP-SERV OF PT,EA 15 MIN: HCPCS | Mod: HHH

## 2024-11-25 SDOH — ECONOMIC STABILITY: HOUSING INSECURITY: HOME SAFETY: NO CHANGE

## 2024-11-25 ASSESSMENT — ENCOUNTER SYMPTOMS
PERSON REPORTING PAIN: PATIENT
DENIES PAIN: 1
PERSON REPORTING PAIN: PATIENT
DENIES PAIN: 1

## 2024-11-26 ENCOUNTER — HOME CARE VISIT (OUTPATIENT)
Dept: HOME HEALTH SERVICES | Facility: HOME HEALTH | Age: 79
End: 2024-11-26
Payer: MEDICARE

## 2024-11-26 DIAGNOSIS — C44.91 BASAL CELL CARCINOMA (BCC), UNSPECIFIED SITE: Primary | ICD-10-CM

## 2024-11-26 PROCEDURE — G0151 HHCP-SERV OF PT,EA 15 MIN: HCPCS | Mod: HHH

## 2024-11-26 SDOH — ECONOMIC STABILITY: HOUSING INSECURITY: HOME SAFETY: NO CHANGE

## 2024-11-26 ASSESSMENT — ENCOUNTER SYMPTOMS
SUBJECTIVE PAIN PROGRESSION: UNCHANGED
PAIN: 1
PERSON REPORTING PAIN: PATIENT

## 2024-11-26 ASSESSMENT — ACTIVITIES OF DAILY LIVING (ADL)
AMBULATION ASSISTANCE ON FLAT SURFACES: 1
AMBULATION_DISTANCE/DURATION_TOLERATED: HOUSEHOLD

## 2024-11-29 ENCOUNTER — HOME CARE VISIT (OUTPATIENT)
Dept: HOME HEALTH SERVICES | Facility: HOME HEALTH | Age: 79
End: 2024-11-29
Payer: MEDICARE

## 2024-11-29 PROCEDURE — G0152 HHCP-SERV OF OT,EA 15 MIN: HCPCS | Mod: HHH

## 2024-11-29 ASSESSMENT — ENCOUNTER SYMPTOMS
PERSON REPORTING PAIN: PATIENT
PAIN LOCATION: LEFT KNEE
PAIN LOCATION - PAIN FREQUENCY: WITH ACTIVITY
HIGHEST PAIN SEVERITY IN PAST 24 HOURS: 2/10
PAIN: 1

## 2024-12-02 ENCOUNTER — HOSPITAL ENCOUNTER (OUTPATIENT)
Dept: RADIATION ONCOLOGY | Facility: HOSPITAL | Age: 79
Setting detail: RADIATION/ONCOLOGY SERIES
Discharge: HOME | End: 2024-12-02
Payer: MEDICARE

## 2024-12-02 VITALS
HEART RATE: 80 BPM | BODY MASS INDEX: 24.25 KG/M2 | OXYGEN SATURATION: 98 % | TEMPERATURE: 97.5 F | WEIGHT: 164.2 LBS | DIASTOLIC BLOOD PRESSURE: 92 MMHG | SYSTOLIC BLOOD PRESSURE: 139 MMHG | RESPIRATION RATE: 18 BRPM

## 2024-12-02 DIAGNOSIS — C34.32 MALIGNANT NEOPLASM OF LOWER LOBE OF LEFT LUNG (MULTI): ICD-10-CM

## 2024-12-02 PROCEDURE — G2211 COMPLEX E/M VISIT ADD ON: HCPCS | Performed by: STUDENT IN AN ORGANIZED HEALTH CARE EDUCATION/TRAINING PROGRAM

## 2024-12-02 PROCEDURE — 99214 OFFICE O/P EST MOD 30 MIN: CPT | Performed by: STUDENT IN AN ORGANIZED HEALTH CARE EDUCATION/TRAINING PROGRAM

## 2024-12-02 ASSESSMENT — ENCOUNTER SYMPTOMS
DECREASED CONCENTRATION: 1
APPETITE CHANGE: 1
NUMBNESS: 1
CONFUSION: 1
EYE PROBLEMS: 1
FATIGUE: 1
SLEEP DISTURBANCE: 1
HEADACHES: 1
CHEST TIGHTNESS: 1
CONSTIPATION: 1

## 2024-12-02 NOTE — PROGRESS NOTES
Staff Physician: Brandon Herrera MD, MS  Resident Physician: Robe Dumas MD PGY-2  Referring Physician: Brandon Herrera MD, *  Date of Service: 12/2/2024  RADIATION ONCOLOGY FOLLOW UP NOTE  IDENTIFYING DATA:   Cancer Staging   No matching staging information was found for the patient.    Problem List Items Addressed This Visit       Malignant neoplasm of lower lobe of left lung (Multi)    Relevant Orders    Clinic Appointment Request LACHELLE CUEVAS; [facility] S[facility] (Completed)    NM PET CT lung CA staging    Clinic Appointment Request Follow Up; BRANDON HERRERA; [facility]       79 year old male with a history of bladder cancer and prostate cancer s/p prostacystectomy with ureteral diversion and ileal conduit creation, who was found to have a newly diagnosed Stage I NSCLC of the LLL s/p SBRT 55Gy/5fx on 3/21/22. The patient is here as a 2 year follow-up post radiation treatment to the lungs.     INTERVAL HISTORY  Since we last saw Wade Montgomery in clinic on 5/14/2024, he has felt well but continues to have fatigue that has slightly worsened. He reports slight worse in cognition as well but states this may be due to his neurodegenerative disease.     His weight is stable from last visit and his appetite is unchanged. He denies any significant symptoms at this time. Specifically, he denies shortness of breath, chest wall tenderness, chest pain, hemoptysis, fevers, cough, new oxygenation requirements.  He has had no new medical problems or medications since our last visit.    His last imaging, comprising CT CAP on 9/2/2024, demonstrates continued emphysematous changes with continued left lower lobe consolidation that has slightly increased in size since prior imaging.    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)     Contact with and (suspected) exposure to unspecified communicable disease 10/24/2019    Exposure to communicable disease    Dry eye syndrome of bilateral lacrimal glands     Dry eye syndrome of both eyes    Encounter for general adult medical examination without abnormal findings 08/19/2019    Initial Medicare annual wellness visit    Excessive tear production 03/18/2024    Finger injury 03/18/2024    History of subdural hematoma 03/18/2024    Nasal congestion 03/18/2024    Other chest pain 07/14/2015    Chest pain, muscular    Pain in left hip 04/20/2022    Left hip pain    Pain in left knee 04/20/2022    Left knee pain    Pain in scrotum 03/18/2024    Pain of right lower extremity 03/18/2024    Pelvic and perineal pain 08/19/2019    Suprapubic pressure    Personal history of other diseases of the circulatory system     History of subdural hematoma    Personal history of other diseases of the digestive system 05/10/2018    History of rectal bleeding    Personal history of other diseases of the musculoskeletal system and connective tissue 02/09/2022    History of low back pain    Personal history of other diseases of the respiratory system 02/27/2014    History of acute bronchitis    Personal history of other diseases of urinary system     History of hematuria    Personal history of other drug therapy     History of influenza vaccination    Personal history of other infectious and parasitic diseases 09/09/2019    History of tinea cruris    Personal history of other infectious and parasitic diseases 03/30/2017    History of tinea cruris    Personal history of other specified conditions 09/09/2020    History of flank pain    Personal history of other specified conditions 03/03/2020    History of nasal congestion    Personal history of other specified conditions 02/14/2022    History of dysuria    Personal history of other specified  conditions 12/10/2019    History of epigastric pain    Personal history of urinary calculi 07/24/2019    History of urinary stone    Rash and other nonspecific skin eruption 01/06/2017    Rash    Retention of urine, unspecified 11/25/2020    Urinary retention with incomplete bladder emptying    Shoulder pain 03/18/2024    Strain of muscle, fascia and tendon of lower back, initial encounter 07/14/2015    Lumbar strain    Tear film insufficiency 03/18/2024    Tinea cruris 03/18/2024    Trochanteric bursitis, left hip 09/22/2021    Greater trochanteric bursitis of left hip     Past Surgical History:   Procedure Laterality Date    CT GUIDED PERCUTANEOUS BIOPSY LUNG  2/9/2022    CT GUIDED PERCUTANEOUS BIOPSY LUNG 2/9/2022 [facility]    OTHER SURGICAL HISTORY  02/13/2020    Hernia repair    OTHER SURGICAL HISTORY  02/13/2020    Knee surgery    OTHER SURGICAL HISTORY  02/13/2020    Trigger finger repair    OTHER SURGICAL HISTORY  01/13/2020    Craniotomy    OTHER SURGICAL HISTORY  01/13/2020    Carpal tunnel surgery    OTHER SURGICAL HISTORY  10/27/2020    Sinus surgery    OTHER SURGICAL HISTORY  02/16/2022    Cataract surgery    OTHER SURGICAL HISTORY  01/26/2021    Nerve biospy     ALLERGIES:  Allergies   Allergen Reactions    Oxycodone Other and Hallucinations     Psychosis    Penicillin V Other     MEDICATIONS:    Current Outpatient Medications:     acetaminophen (TYLENOL ORAL), Take 2 tablets by mouth if needed (Mild pain)., Disp: , Rfl:     aspirin 81 mg EC tablet, Take 1 tablet (81 mg) by mouth once daily., Disp: , Rfl:     carbidopa-levodopa (Sinemet)  mg tablet, 1.5 tabs at 8am / 1.5 tabs at 12pm / 1.5 tabs at 4pm and 1 tab at 8pm, Disp: 450 tablet, Rfl: 3    carboxymethylcellulose (Refresh Plus) 0.5 % ophthalmic solution, Administer into affected eye(s) 4 times a day., Disp: , Rfl:     diclofenac sodium (Voltaren) 1 % gel gel, Apply 4.5 inches (4 g) topically 4 times a day.  APPLY TO LOWER EXTREMITIES.  DO NOT APPLY MORE THAN 16 GM DAILY TO ANY ONE AFFECTED JOINT., Disp: , Rfl:     diphenhydramine HCl (BENADRYL ORAL), Take 1 tablet by mouth if needed (Seasonal allergies)., Disp: , Rfl:     docusate sodium (Colace) 100 mg capsule, Take 2 capsules (200 mg) by mouth once daily., Disp: , Rfl:     fluoride, sodium, (Prevident 5000 Booster) 1.1 % dental paste, USE TO BRUSH ON TEETH IN PLACE OF TOOTHPASTE TWICE A DAY., Disp: , Rfl:     fluticasone (Flonase) 50 mcg/actuation nasal spray, Administer 2 sprays into each nostril if needed., Disp: , Rfl:     Gammaplex 10 % solution, , Disp: , Rfl:     ibuprofen 600 mg tablet, Take 1 tablet (600 mg) by mouth every 6 hours if needed for mild pain (1 - 3). States takes 200 mg, Disp: , Rfl:     immune globul G-gly-IgA avg 46 (Gamunex-C) 20 gram/200 mL (10 %) solution, Infuse 70 gm divided over 1 days every 3 weeks x 6 months. Premedicate with 650 mg acetaminophen PO, 25 mg diphenhydramine PO, and 100 mg hydrocortisone IV push., Disp: , Rfl:     ketoconazole (NIZOral) 2 % cream, Apply twice daily to affected areas of face, Disp: 60 g, Rfl: 11    ketoconazole (NIZOral) 2 % shampoo, Ketoconazole 2 % External Shampoo Quantity: 120  Refills: 0  Start: 15-Sep-2021, Disp: , Rfl:     melatonin 5 mg tablet, Take 1 tablet (5 mg) by mouth once daily at bedtime., Disp: , Rfl:     multivitamin tablet, Take 1 tablet by mouth once daily. Take with food, Disp: , Rfl:     pimavanserin (Nuplazid) 34 mg capsule, Take 34 mg by mouth once daily., Disp: 90 capsule, Rfl: 3    polyethylene glycol (Miralax) 17 gram/dose powder, Mix 17 g of powder and drink once daily., Disp: , Rfl:     predniSONE (Deltasone) 5 mg tablet, Take 1 tablet (5 mg) by mouth once daily., Disp: 90 tablet, Rfl: 3    psyllium (Metamucil) 3.4 gram packet, Take 1 packet by mouth once daily., Disp: , Rfl:     Restasis 0.05 % ophthalmic emulsion, Administer 1 drop into both eyes every 12 hours., Disp: 180 each, Rfl: 3    rosuvastatin  (Crestor) 5 mg tablet, Take 1 tablet (5 mg) by mouth once daily., Disp: 90 tablet, Rfl: 3    sertraline (Zoloft) 25 mg tablet, Take 1 tablet (25 mg) by mouth once daily., Disp: 90 tablet, Rfl: 3     REVIEW OF SYSTEMS:  As per RN note, HPI.    PERFORMANCE STATUS:  Karnofsky Performance Score/ECO, Requires occasional assistance, but is able to care for most of his personal needs (ECOG equivalent 2)    PHYSICAL EXAMINATION:  BP (!) 139/92   Pulse 80   Temp 36.4 °C (97.5 °F) (Skin)   Resp 18   Wt 74.5 kg (164 lb 3.2 oz)   SpO2 98%   BMI 24.25 kg/m²   Pain score: 0/10  Physical Exam  Constitutional:       General: He is not in acute distress.     Appearance: He is normal weight.   Cardiovascular:      Rate and Rhythm: Normal rate and regular rhythm.      Heart sounds: No murmur heard.  Pulmonary:      Effort: Pulmonary effort is normal. No respiratory distress.      Breath sounds: Normal breath sounds. No wheezing or rales.   Skin:     General: Skin is warm and dry.      Coloration: Skin is not jaundiced.   Neurological:      Mental Status: He is alert and oriented to person, place, and time.      Motor: Weakness present.           DIAGNOSTIC REPORTS REVIEWED:  Imaging: All imaging was personally reviewed and interpreted in clinic. Findings as per interval history and EMR.    CT C/A/P 2024:  IMPRESSION:  1.Emphysematous changes with bilateral parenchymal scarring, LEFT  greater than RIGHT. LEFT lower lobe consolidation persists, slightly  increased in size compared prior examination.  2.RIGHT adrenal adenoma.  3.Prior cystectomy with creation of ileal conduit.  4.Cholelithiasis without evidence of acute cholecystitis.  5.Chronic compression fracture of the T8 vertebral body.  6.Degenerative changes of the lumbar spine with concern for  impingement of exiting nerves at L4-5 and L5-S1.    Laboratory/Pathology:  All pertinent labs and pathology were personally reviewed and interpreted in clinic. Findings as  per interval history and EMR.     IMPRESSION:  78 year old male with a history of bladder cancer and prostate cancer s/p prostacystectomy with ureteral diversion and ileal conduit creation, who was found to have a newly diagnosed Stage I NSCLC of the LLL s/p SBRT 55Gy/5fx on 3/21/22. The patient is here as a 2.5 year follow-up post radiation treatment to the lungs.     PLAN: I have asked Wade Montgomery to please return to clinic in 2 month's time with PET/CT to better evaluate the CT chest consolidative changes.     PAIN PLAN: The patient reports their pain is well-controlled on their current regimen.  Their pain regimen is currently managed by Primary Care.      DISEASE STATUS: Controlled  NEW METACHRONOUS CANCER: No    Thank you for the opportunity to participate in the ongoing care of this pleasant man.    Robe Dumas MD  PGY-2, Radiation Oncology Resident  On-call pager [phone]  Available on Epic Secure Chat    Staffed with Dr. Giovanni Fitzgerald MD MS

## 2024-12-02 NOTE — PROGRESS NOTES
Radiation Oncology Nursing Note    Pain: The patient's current pain level was assessed.  They report currently having a pain of 0 out of 10.  They feel their pain is under control without the use of pain medications.    Review of Systems:  Review of Systems   Constitutional:  Positive for appetite change and fatigue.   HENT:  Negative.     Eyes:  Positive for eye problems.        Depth perception, dry eyes   Respiratory:  Positive for chest tightness.    Gastrointestinal:  Positive for constipation.   Genitourinary: Negative.     Musculoskeletal:  Positive for gait problem.   Skin: Negative.    Neurological:  Positive for gait problem, headaches and numbness.   Psychiatric/Behavioral:  Positive for confusion, decreased concentration and sleep disturbance.

## 2024-12-04 ENCOUNTER — HOME CARE VISIT (OUTPATIENT)
Dept: HOME HEALTH SERVICES | Facility: HOME HEALTH | Age: 79
End: 2024-12-04
Payer: MEDICARE

## 2024-12-05 ENCOUNTER — HOME CARE VISIT (OUTPATIENT)
Dept: HOME HEALTH SERVICES | Facility: HOME HEALTH | Age: 79
End: 2024-12-05
Payer: MEDICARE

## 2024-12-05 PROCEDURE — G0152 HHCP-SERV OF OT,EA 15 MIN: HCPCS | Mod: HHH

## 2024-12-06 ENCOUNTER — APPOINTMENT (OUTPATIENT)
Dept: INFUSION THERAPY | Facility: HOSPITAL | Age: 79
End: 2024-12-06
Payer: MEDICARE

## 2024-12-06 ENCOUNTER — HOME CARE VISIT (OUTPATIENT)
Dept: HOME HEALTH SERVICES | Facility: HOME HEALTH | Age: 79
End: 2024-12-06
Payer: MEDICARE

## 2024-12-09 ENCOUNTER — TELEPHONE (OUTPATIENT)
Facility: CLINIC | Age: 79
End: 2024-12-09
Payer: MEDICARE

## 2024-12-09 NOTE — TELEPHONE ENCOUNTER
Patients wife called in stating that Donato has been taking the pimavanserin, however she noticed Saturday was a really bad cognitive day and he hallucinated all day so she did not give  him his dose that day and has not given it to him. On Sunday he only had one episode of hallucinations. She wanted to make you aware of what's going on prior to giving him another dose.

## 2024-12-12 ENCOUNTER — HOME CARE VISIT (OUTPATIENT)
Dept: HOME HEALTH SERVICES | Facility: HOME HEALTH | Age: 79
End: 2024-12-12
Payer: MEDICARE

## 2024-12-12 PROCEDURE — G0152 HHCP-SERV OF OT,EA 15 MIN: HCPCS | Mod: HHH

## 2024-12-12 ASSESSMENT — ENCOUNTER SYMPTOMS
PAIN: 1
PAIN LOCATION: LEFT KNEE
PERSON REPORTING PAIN: PATIENT

## 2024-12-13 ENCOUNTER — HOME CARE VISIT (OUTPATIENT)
Dept: HOME HEALTH SERVICES | Facility: HOME HEALTH | Age: 79
End: 2024-12-13
Payer: MEDICARE

## 2024-12-16 ENCOUNTER — HOME CARE VISIT (OUTPATIENT)
Dept: HOME HEALTH SERVICES | Facility: HOME HEALTH | Age: 79
End: 2024-12-16
Payer: MEDICARE

## 2024-12-16 PROCEDURE — G0152 HHCP-SERV OF OT,EA 15 MIN: HCPCS | Mod: HHH

## 2024-12-17 ASSESSMENT — ENCOUNTER SYMPTOMS
PAIN: 1
PAIN LOCATION: RIGHT SHOULDER
PAIN LOCATION: LEFT KNEE

## 2024-12-18 ENCOUNTER — APPOINTMENT (OUTPATIENT)
Dept: ORTHOPEDIC SURGERY | Facility: CLINIC | Age: 79
End: 2024-12-18
Payer: MEDICARE

## 2024-12-18 DIAGNOSIS — M25.562 CHRONIC PAIN OF LEFT KNEE: Primary | ICD-10-CM

## 2024-12-18 DIAGNOSIS — G89.29 CHRONIC PAIN OF LEFT KNEE: Primary | ICD-10-CM

## 2024-12-18 PROCEDURE — 1123F ACP DISCUSS/DSCN MKR DOCD: CPT | Performed by: ORTHOPAEDIC SURGERY

## 2024-12-18 PROCEDURE — 1159F MED LIST DOCD IN RCRD: CPT | Performed by: ORTHOPAEDIC SURGERY

## 2024-12-18 PROCEDURE — 1125F AMNT PAIN NOTED PAIN PRSNT: CPT | Performed by: ORTHOPAEDIC SURGERY

## 2024-12-18 PROCEDURE — 1160F RVW MEDS BY RX/DR IN RCRD: CPT | Performed by: ORTHOPAEDIC SURGERY

## 2024-12-18 PROCEDURE — 1036F TOBACCO NON-USER: CPT | Performed by: ORTHOPAEDIC SURGERY

## 2024-12-18 PROCEDURE — 20610 DRAIN/INJ JOINT/BURSA W/O US: CPT | Performed by: ORTHOPAEDIC SURGERY

## 2024-12-18 PROCEDURE — 99214 OFFICE O/P EST MOD 30 MIN: CPT | Performed by: ORTHOPAEDIC SURGERY

## 2024-12-18 PROCEDURE — 1157F ADVNC CARE PLAN IN RCRD: CPT | Performed by: ORTHOPAEDIC SURGERY

## 2024-12-18 RX ORDER — TRIAMCINOLONE ACETONIDE 40 MG/ML
1 INJECTION, SUSPENSION INTRA-ARTICULAR; INTRAMUSCULAR
Status: COMPLETED | OUTPATIENT
Start: 2024-12-18 | End: 2024-12-18

## 2024-12-18 ASSESSMENT — PAIN - FUNCTIONAL ASSESSMENT: PAIN_FUNCTIONAL_ASSESSMENT: 0-10

## 2024-12-18 ASSESSMENT — PAIN SCALES - GENERAL: PAINLEVEL_OUTOF10: 4

## 2024-12-18 NOTE — PROGRESS NOTES
This is a consultation from Dr. Braydon Austin MD for No chief complaint on file.      This is a 79 y.o. male who presents for his here for follow-up for his left knee.  Patient has left knee arthritis, he had injection back in April of this year.  They gave him good relief and lasted for about 3 to 4 months.  Since then has had return of his symptom exacerbation of his pain.  He also complains of instability his left knee, he has been working physical therapy.  He walks with a walker.  No numbness or tingling no other new issues    Physical Exam    There has been no interval change in this patient's past medical, surgical, medications, allergies, family history or social history since the most recent visit to a provider within our department. 14 point review of systems was performed, reviewed, and negative except for pertinent positives documented in the history of present illness.     Constitutional: well developed, well nourished male in no acute distress  Psychiatric: normal mood, appropriate affect  Eyes: sclera anicteric  HENT: normocephalic/atraumatic  CV: regular rate and rhythm   Respiratory: non labored breathing  Integumentary: no rash  Neurological: moves all extremities    Left knee exam: skin intact no lacerations or abrations. no effusion.  Tender medial lateral joint line. negative log roll negative patellar grind. ROM 0-120. stable to varus and valgus stress at 0 and 30 degrees. negative lachman negative posterior drawer negative karen. 5/5 ehl/fhl/gs/ta. silt s/s/sp/dp/t. 2+ dp/pt        Xrays were ordered by me, they were reviewed and independently interpreted by me today, they show severe degenerative disease bone-on-bone arthritis left knee    L Inj/Asp: L knee on 12/18/2024 10:21 AM  Indications: pain and joint swelling  Details: 22 G needle, anterolateral approach  Medications: 1 mL triamcinolone acetonide 40 mg/mL    Discussion:  I discussed the conservative treatment options for knee  osteoarthritis including but not limited to physical therapy, oral NSAIDS, activity and lifestyle modification, and corticosteroid injections. Pt has elected to undergo a cortisone injection today. I have explained the risk and benefits of an injection including the possibility of joint infection, bleeding, damage to cartilage, allergic reaction. Patient verbalized understanding and gave verbal consent wishes to proceed with a intra-articular cortisone injection for their knee.    Procedure:  After discussing the risk and benefits of the procedure, we proceeded with an intra-articular left knee injection. We discussed the risks and benefits and potential morbidity related to the treatment, and to the prescription medication administered in the injection    With the patient's informed verbal consent, the left knee was prepped in standard sterile fashion with Chlorhexidine. The skin was then anesthetized with ethyl chloride spray and cleaned again with Chlorhexidine. The knee was then apirated/injected with a prefilled 20-gauge syringe of 40 mg Kenalog + 4 ml Lidocaine using the lateral approach without complications.  The patient tolerated this well and felt immediate initial relief of symptoms. A bandaid was applied and the patient ambulated out of the clinic on ther own accord without difficulty. Patient was instructed to avoid physical activity for 24-48 hours to prevent the knees from swelling and may ice the knees as tolerated. Patient should contact the office if any signs of of infection appear: redness, fever, chills, drainage, swelling or warmth to the knees.  Pt understands that the injections can be repeated no sooner than 3 months.  Procedure, treatment alternatives, risks and benefits explained, specific risks discussed. Consent was given by the patient. Immediately prior to procedure a time out was called to verify the correct patient, procedure, equipment, support staff and site/side marked as  "required. Patient was prepped and draped in the usual sterile fashion.             Impression/Plan: This is a 79 y.o. male with severe left knee arthritis.  I had an in depth discussion with the patient regarding treatment options for arthritis and their relative risks and benefits. We reviewed surgical and nonsurgical option for treatment. Treatments include anti inflammatory medications, physical therapy, weight loss, activity modification, use of assistive devices, injection therapies. We discussed current prescriptions and risks and benefits of continuation of prescription medication as apporpriate. We discussed that arthritis is often progressive over time, an in end stage arthritis surgical interventions can be considered, including arthroplasty. All questions were answered and the patient voiced their understanding.  Continue nonsurgical treatments, not a great candidate for surgery given his dysfunction from Parkinson's.  Will see him back.    BMI Readings from Last 1 Encounters:   12/02/24 24.25 kg/m²      Lab Results   Component Value Date    CREATININE 0.75 09/26/2024     Tobacco Use: Low Risk  (12/2/2024)    Patient History     Smoking Tobacco Use: Never     Smokeless Tobacco Use: Never     Passive Exposure: Past      Computed MELD 3.0 unavailable. One or more values for this score either were not found within the given timeframe or did not fit some other criterion.  Computed MELD-Na unavailable. One or more values for this score either were not found within the given timeframe or did not fit some other criterion.       Lab Results   Component Value Date    HGBA1C 5.1 02/13/2020     No results found for: \"STAPHMRSASCR\"  "

## 2024-12-21 ENCOUNTER — HOME CARE VISIT (OUTPATIENT)
Dept: HOME HEALTH SERVICES | Facility: HOME HEALTH | Age: 79
End: 2024-12-21
Payer: MEDICARE

## 2024-12-26 ENCOUNTER — HOME CARE VISIT (OUTPATIENT)
Dept: HOME HEALTH SERVICES | Facility: HOME HEALTH | Age: 79
End: 2024-12-26
Payer: MEDICARE

## 2024-12-26 ENCOUNTER — TELEPHONE (OUTPATIENT)
Dept: PRIMARY CARE | Facility: CLINIC | Age: 79
End: 2024-12-26
Payer: MEDICARE

## 2024-12-26 DIAGNOSIS — F34.1 PERSISTENT DEPRESSIVE DISORDER: ICD-10-CM

## 2024-12-26 RX ORDER — SERTRALINE HYDROCHLORIDE 50 MG/1
50 TABLET, FILM COATED ORAL DAILY
Qty: 90 TABLET | Refills: 3 | Status: SHIPPED | OUTPATIENT
Start: 2024-12-26 | End: 2025-12-26

## 2024-12-26 NOTE — TELEPHONE ENCOUNTER
Pt wife, Katie, called and stated that pt is having increased anxiety and was wondering if you could maybe increase his dosage or would he need an appointment to discuss.  He currently takes 25 mg of sertraline a day.   There pharmacy is [facility] in [place] if able to sent in RX.  His next appointment with you is 5/30/25     Please advise.

## 2024-12-27 ENCOUNTER — APPOINTMENT (OUTPATIENT)
Dept: INFUSION THERAPY | Facility: HOSPITAL | Age: 79
End: 2024-12-27
Payer: MEDICARE

## 2024-12-27 ASSESSMENT — ACTIVITIES OF DAILY LIVING (ADL)
OASIS_M1830: 03
HOME_HEALTH_OASIS: 01

## 2024-12-27 ASSESSMENT — ENCOUNTER SYMPTOMS: OCCASIONAL FEELINGS OF UNSTEADINESS: 1

## 2024-12-27 NOTE — TELEPHONE ENCOUNTER
Called and spoke to Katie, wife, let her know that RX was sent to pharmacy. He is scheduled for 1/28/25 for 1 month follow up.

## 2025-01-06 ENCOUNTER — TELEPHONE (OUTPATIENT)
Dept: NEUROLOGY | Facility: CLINIC | Age: 80
End: 2025-01-06
Payer: MEDICARE

## 2025-01-06 NOTE — TELEPHONE ENCOUNTER
Patient restarted Nuplazid and has been back on it for two weeks and his issues are not fully gone however are improved. He is now on 50mg of sertraline as well. They were told to call and give a update.

## 2025-01-09 ENCOUNTER — TELEMEDICINE (OUTPATIENT)
Dept: NEUROLOGY | Facility: CLINIC | Age: 80
End: 2025-01-09
Payer: MEDICARE

## 2025-01-09 ENCOUNTER — APPOINTMENT (OUTPATIENT)
Dept: NEUROLOGY | Facility: HOSPITAL | Age: 80
End: 2025-01-09
Payer: MEDICARE

## 2025-01-09 DIAGNOSIS — G62.89: Primary | ICD-10-CM

## 2025-01-09 PROCEDURE — 1123F ACP DISCUSS/DSCN MKR DOCD: CPT | Performed by: PSYCHIATRY & NEUROLOGY

## 2025-01-09 PROCEDURE — 1157F ADVNC CARE PLAN IN RCRD: CPT | Performed by: PSYCHIATRY & NEUROLOGY

## 2025-01-09 PROCEDURE — G2211 COMPLEX E/M VISIT ADD ON: HCPCS | Performed by: PSYCHIATRY & NEUROLOGY

## 2025-01-09 PROCEDURE — 99212 OFFICE O/P EST SF 10 MIN: CPT | Performed by: PSYCHIATRY & NEUROLOGY

## 2025-01-09 NOTE — PROGRESS NOTES
Virtual or Telephone Consent    An interactive audio and video telecommunication system which permits real time communications between the patient (at the originating site) and provider (at the distant site) was utilized to provide this telehealth service.   Verbal consent was requested and obtained from Wade Montgomery on this date, 25 for a telehealth visit.      Neuromuscular Medicine Follow Up     Wade Montgomery, MRN: [id number], : 1945  Reason for Visit: No chief complaint on file.     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 Each year for the last few years, he has been without IVIG for many weeks due to insurance not approving his IVIG in a timely manner. His motor and sensory symptoms due to CIDP worsen without IVIG.     His symptoms of CIDP have improved with IVIG. He has not had any worsening with being on a stable dose of IVIG, 70 grams every three weeks.    He was accompanied to today's virtual visit by his wife.    Impression:     Plan:  Problem List Items Addressed This Visit    None  -- continue prednisone 5 mg daily  -- continue IVIG every 3 weeks    Dianne Robertson MD  Neuromuscular Neurology  [facility]  Office Phone Number: [phone]       History of Present Illness:    Mr. Montgomery is a 79 y.o. male with a history of parkinsonism and CIDP.    He uses a rollator to walk around the home. Some days he walks better than other days. Some days he has to stop and lean against the wall.     He has been receving IVIG for CIDP diagnosis. He feels overall weaker than prior. He is exercising five times a week; he does exercises in the chair and uses a bicycle; he uses a desk cycle. He uses a recumbant bicycle. He is tolerating the IVIG when he gets it.  He is receiving the IVIG at home now.     He will be seeing Dr. Chery in [place] in February.    His next dose of IVIG will be tomorrow  and Saturday.    Prior History:     Relevant past medical, surgical, family, and social histories, along with ROS was reviewed and pertinent details noted above.     Allergies   Allergen Reactions    Oxycodone Other and Hallucinations     Psychosis    Penicillin V Other      Medications:    Current Outpatient Medications:     acetaminophen (TYLENOL ORAL), Take 2 tablets by mouth if needed (Mild pain)., Disp: , Rfl:     aspirin 81 mg EC tablet, Take 1 tablet (81 mg) by mouth once daily., Disp: , Rfl:     carbidopa-levodopa (Sinemet)  mg tablet, 1.5 tabs at 8am / 1.5 tabs at 12pm / 1.5 tabs at 4pm and 1 tab at 8pm, Disp: 450 tablet, Rfl: 3    carboxymethylcellulose (Refresh Plus) 0.5 % ophthalmic solution, Administer into affected eye(s) 4 times a day., Disp: , Rfl:     diclofenac sodium (Voltaren) 1 % gel gel, Apply 4.5 inches (4 g) topically 4 times a day.  APPLY TO LOWER EXTREMITIES. DO NOT APPLY MORE THAN 16 GM DAILY TO ANY ONE AFFECTED JOINT., Disp: , Rfl:     diphenhydramine HCl (BENADRYL ORAL), Take 1 tablet by mouth if needed (Seasonal allergies)., Disp: , Rfl:     docusate sodium (Colace) 100 mg capsule, Take 2 capsules (200 mg) by mouth once daily., Disp: , Rfl:     fluoride, sodium, (Prevident 5000 Booster) 1.1 % dental paste, USE TO BRUSH ON TEETH IN PLACE OF TOOTHPASTE TWICE A DAY., Disp: , Rfl:     fluticasone (Flonase) 50 mcg/actuation nasal spray, Administer 2 sprays into each nostril if needed., Disp: , Rfl:     Gammaplex 10 % solution, , Disp: , Rfl:     ibuprofen 600 mg tablet, Take 1 tablet (600 mg) by mouth every 6 hours if needed for mild pain (1 - 3). States takes 200 mg, Disp: , Rfl:     immune globul G-gly-IgA avg 46 (Gamunex-C) 20 gram/200 mL (10 %) solution, Infuse 70 gm divided over 1 days every 3 weeks x 6 months. Premedicate with 650 mg acetaminophen PO, 25 mg diphenhydramine PO, and 100 mg hydrocortisone IV push., Disp: , Rfl:     ketoconazole (NIZOral) 2 % cream, Apply twice daily  to affected areas of face, Disp: 60 g, Rfl: 11    ketoconazole (NIZOral) 2 % shampoo, Ketoconazole 2 % External Shampoo Quantity: 120  Refills: 0  Start: 15-Sep-2021, Disp: , Rfl:     melatonin 5 mg tablet, Take 1 tablet (5 mg) by mouth once daily at bedtime., Disp: , Rfl:     multivitamin tablet, Take 1 tablet by mouth once daily. Take with food, Disp: , Rfl:     pimavanserin (Nuplazid) 34 mg capsule, Take 34 mg by mouth once daily., Disp: 90 capsule, Rfl: 3    polyethylene glycol (Miralax) 17 gram/dose powder, Mix 17 g of powder and drink once daily., Disp: , Rfl:     predniSONE (Deltasone) 5 mg tablet, Take 1 tablet (5 mg) by mouth once daily., Disp: 90 tablet, Rfl: 3    psyllium (Metamucil) 3.4 gram packet, Take 1 packet by mouth once daily., Disp: , Rfl:     Restasis 0.05 % ophthalmic emulsion, Administer 1 drop into both eyes every 12 hours., Disp: 180 each, Rfl: 3    rosuvastatin (Crestor) 5 mg tablet, Take 1 tablet (5 mg) by mouth once daily., Disp: 90 tablet, Rfl: 3    sertraline (Zoloft) 50 mg tablet, Take 1 tablet (50 mg) by mouth once daily., Disp: 90 tablet, Rfl: 3       Physical Exam:   There were no vitals taken for this visit.     Virtual Visit thus exam is limited    Alert and awake  Masked facies  Able to show me lifting his arms up and able to show me moving his legs  Movements are slow      Results:     The following labs, imaging, and/or data were personally reviewed and demonstrated:    -----------------------------------------------------------------------------------------------------------------------------  ATTENDING ATTESTATION    I personally spent 18 minutes on the day of the visit completing the review of the medical record and outside records, obtaining history and performing an appropriate physical exam, patient care, counseling and education,  independently reviewing results, communicating with the patient/family, coordinating care.    Dianne Robertson MD  Neuromuscular  [facility]  Office Phone Number: [phone]

## 2025-01-27 ENCOUNTER — TELEPHONE (OUTPATIENT)
Dept: PRIMARY CARE | Facility: CLINIC | Age: 80
End: 2025-01-27
Payer: MEDICARE

## 2025-01-27 NOTE — TELEPHONE ENCOUNTER
Patients wife called and they have concerns  about the weather, was wondering if his appointment can be switched to virtual.     Please advise, will call and let them know provider is out of office and office will call and let them know first thing in the morning.

## 2025-01-28 ENCOUNTER — APPOINTMENT (OUTPATIENT)
Dept: PRIMARY CARE | Facility: CLINIC | Age: 80
End: 2025-01-28
Payer: MEDICARE

## 2025-01-28 DIAGNOSIS — F34.1 PERSISTENT DEPRESSIVE DISORDER: ICD-10-CM

## 2025-01-28 DIAGNOSIS — F41.1 GAD (GENERALIZED ANXIETY DISORDER): Primary | ICD-10-CM

## 2025-01-28 PROCEDURE — 1159F MED LIST DOCD IN RCRD: CPT | Performed by: STUDENT IN AN ORGANIZED HEALTH CARE EDUCATION/TRAINING PROGRAM

## 2025-01-28 PROCEDURE — 1157F ADVNC CARE PLAN IN RCRD: CPT | Performed by: STUDENT IN AN ORGANIZED HEALTH CARE EDUCATION/TRAINING PROGRAM

## 2025-01-28 PROCEDURE — 99214 OFFICE O/P EST MOD 30 MIN: CPT | Performed by: STUDENT IN AN ORGANIZED HEALTH CARE EDUCATION/TRAINING PROGRAM

## 2025-01-28 PROCEDURE — 1123F ACP DISCUSS/DSCN MKR DOCD: CPT | Performed by: STUDENT IN AN ORGANIZED HEALTH CARE EDUCATION/TRAINING PROGRAM

## 2025-01-28 PROCEDURE — 1036F TOBACCO NON-USER: CPT | Performed by: STUDENT IN AN ORGANIZED HEALTH CARE EDUCATION/TRAINING PROGRAM

## 2025-01-28 PROCEDURE — 1160F RVW MEDS BY RX/DR IN RCRD: CPT | Performed by: STUDENT IN AN ORGANIZED HEALTH CARE EDUCATION/TRAINING PROGRAM

## 2025-01-28 RX ORDER — SERTRALINE HYDROCHLORIDE 50 MG/1
75 TABLET, FILM COATED ORAL DAILY
Qty: 135 TABLET | Refills: 3 | Status: SHIPPED | OUTPATIENT
Start: 2025-01-28 | End: 2026-01-28

## 2025-01-28 ASSESSMENT — ANXIETY QUESTIONNAIRES
3. WORRYING TOO MUCH ABOUT DIFFERENT THINGS: SEVERAL DAYS
1. FEELING NERVOUS, ANXIOUS, OR ON EDGE: MORE THAN HALF THE DAYS
GAD7 TOTAL SCORE: 7
2. NOT BEING ABLE TO STOP OR CONTROL WORRYING: SEVERAL DAYS
5. BEING SO RESTLESS THAT IT IS HARD TO SIT STILL: NOT AT ALL
4. TROUBLE RELAXING: SEVERAL DAYS
7. FEELING AFRAID AS IF SOMETHING AWFUL MIGHT HAPPEN: SEVERAL DAYS
6. BECOMING EASILY ANNOYED OR IRRITABLE: SEVERAL DAYS

## 2025-01-28 ASSESSMENT — ENCOUNTER SYMPTOMS
SHORTNESS OF BREATH: 1
DYSPHORIC MOOD: 1
FEVER: 0
CHILLS: 0
NERVOUS/ANXIOUS: 1

## 2025-01-28 NOTE — PROGRESS NOTES
"  Assessment/Plan   Assessment & Plan  Persistent depressive disorder    Orders:    sertraline (Zoloft) 50 mg tablet; Take 1.5 tablets (75 mg) by mouth once daily.    JAX (generalized anxiety disorder)    Orders:    sertraline (Zoloft) 50 mg tablet; Take 1.5 tablets (75 mg) by mouth once daily.        Subjective   Patient ID: Wade Montgomery \"Bertin" is a 79 y.o. male who presents for No chief complaint on file..  HPI  Patient is doing well on the sertraline 50 mg every day. Denies side effects from the medication.     JAX 7 was 7 today, patient still feels like anxiety uncontrolled, asking for increase sertraline   Review of Systems   Constitutional:  Negative for chills and fever.   Respiratory:  Positive for shortness of breath (with exertion).    Cardiovascular:  Negative for chest pain.   Psychiatric/Behavioral:  Positive for dysphoric mood. Negative for suicidal ideas. The patient is nervous/anxious.        Objective   Physical Exam  Constitutional:       Appearance: Normal appearance.   HENT:      Head: Normocephalic and atraumatic.   Neurological:      Mental Status: He is alert.          (Delete if audio only)    Virtual or Telephone Consent    An interactive audio and video telecommunication system which permits real time communications between the patient (at the originating site) and provider (at the distant site) was utilized to provide this telehealth service.   Verbal consent was requested and obtained from Wade Montgomery on this date, 01/28/25 for a telehealth visit.        Braydon Austin MD 01/28/25 10:48 AM   "

## 2025-01-31 ENCOUNTER — APPOINTMENT (OUTPATIENT)
Dept: DERMATOLOGY | Facility: CLINIC | Age: 80
End: 2025-01-31
Payer: MEDICARE

## 2025-01-31 ENCOUNTER — TELEPHONE (OUTPATIENT)
Dept: RADIATION ONCOLOGY | Facility: HOSPITAL | Age: 80
End: 2025-01-31

## 2025-01-31 VITALS — SYSTOLIC BLOOD PRESSURE: 146 MMHG | DIASTOLIC BLOOD PRESSURE: 93 MMHG | HEART RATE: 85 BPM

## 2025-01-31 DIAGNOSIS — C44.319 BASAL CELL CARCINOMA (BCC) OF RIGHT TEMPLE REGION: Primary | ICD-10-CM

## 2025-01-31 NOTE — PROGRESS NOTES
Mohs Surgery Operative Note    Date of Surgery:  1/31/2025  Surgeon:  Alvaro Hadley MD  Office Location:  [address] [facility]  [address] [facility] [address]  [facility] [address]  Dept: [phone]  Dept Fax: [phone]  Referring Provider: Antonio Chow MD  [address] [place] [address] [address] [facility], [facility] [address]  [place] [address]      Assessment/Plan   Pre-procedure:   Obtained informed consent: written from patient  The surgical site was identified and confirmed with the patient.     Intra-operative:   Audible time out called at : 09:16 AM 01/31/25  by: BERNABE BAKER RN   Verified patient name, birthdate, site, specimen bottle label & requisition.    The planned procedure(s) was again reviewed with the patient. The risks of bleeding, infection, nerve damage and scarring were reviewed. Written authorization was obtained. The patient identity, surgical site, and planned procedure(s) were verified. The provider acted as both surgeon and pathologist.     Basal cell carcinoma (BCC) of right temple region  Right Anterior Sells    Mohs surgery    Consent obtained: written    Universal Protocol:  Procedure explained and questions answered to patient or proxy's satisfaction: Yes    Test results available and properly labeled: Yes    Pathology report reviewed: Yes    External notes reviewed: Yes    Photo or diagram used for site identification: Yes    Site/side marked: Yes    Slide independently reviewed by Mohs surgeon: Yes    Immediately prior to procedure a time out was called: Yes    Patient identity confirmed: verbally with patient  Preparation: Patient was prepped and draped in usual sterile fashion      Anticoagulation:  Is the patient taking prescription anticoagulant and/or aspirin prescribed/recommended by a physician? Yes    Was the anticoagulation regimen changed prior to Mohs? No      Anesthesia:  Anesthesia method: local infiltration  Local anesthetic: lidocaine 1% WITH epi    Procedure  Details:  Case ID Number: [id number]  Biopsy accession number: [id number]  Date of biopsy: 11/20/2024  Pre-Op diagnosis: basal cell carcinoma  BCC subtype: micronodular  Surgical site (from skin exam): Right Anterior Simpson  Pre-operative length (cm): 0.7  Pre-operative width (cm): 0.6  Indications for Mohs surgery: anatomic location where tissue conservation is critical  Previously treated? No      Micrographic Surgery Details:  Post-operative length (cm): 0.8  Post-operative width (cm): 0.8  Number of Mohs stages: 1    Stage 1     Comments: The patient was brought into the operating room and placed in the procedure chair in the appropriate position.  The area positive by previous biopsy was identified and confirmed with the patient. The area of clinically obvious tumor was debulked using a curette and/or scalpel as needed. An incision was made following the Mohs approach through the skin. The specimen was taken to the lab, divided into 2 piece(s) and appropriately chromacoded and processed.                 Tumor features identified on Mohs section: no tumor identified    Depth of defect: subcutaneous fat    Patient tolerance of procedure: tolerated well, no immediate complications    Reconstruction:  Was the defect reconstructed? Yes    Was reconstruction performed by the same Mohs surgeon? Yes    Setting of reconstruction: outpatient office  When was reconstruction performed? same day  Type of reconstruction: linear  Linear reconstruction: complex  Length of linear repair (cm): 2.8  Subcutaneous Layers (Deep Stitches)   Suture size:  5-0  Suture type:  Vicryl  Stitches:  Buried vertical mattress  Fine/surface layer approximation (top stitches)   Fine approximation suture size: 6-0.  Superficial suture type: Express Gut.  Hemostasis achieved with: electrodesiccation  Outcome: patient tolerated procedure well with no complications    Post-procedure details: sterile dressing applied and wound care instructions given     Dressing type: pressure dressing          Complex Linear Repair - Wide Undermining:  Given the location and size of the defect, it was determined that a complex layered linear closure was required to restore normal anatomy and function. The repair was considered complex because extensive undermining was required and performed. The amount of undermining performed was greater than the maximum width of the defect as measured perpendicular to the closure line along at least one entire edge of the defect. Standing cutaneous cones were removed using Burow's triangles. The wound edges were brought into close approximation with buried vertical mattress sutures. The remainder of the wound was then closed with epidermal top sutures.    The final repair measured 2.8 cm                Wound care was discussed, and the patient was given written post-operative wound care instructions.      The patient will follow up with Alvaro Hadley MD as needed for any post operative problems or concerns, and will follow up with their primary dermatologist as scheduled.

## 2025-01-31 NOTE — PROGRESS NOTES
"Office Visit Note  Date: 1/31/2025  Surgeon:  Alvaro Hadley MD  Office Location:  [address] [facility]  [address] [facility] [address]  [facility] [address]  Dept: [phone]  Dept Fax: [phone]  Referring Provider: [facility]  [place] [address]    Subjective   Wade Montgomery \"Donato\" is a 79 y.o. male who presents for the following: MOHS Surgery    According to the patient, the lesion has been present for approximately 6 months at the time of diagnosis.  The lesion is not causing symptoms.  The lesion has not been treated previously.    The patient does not have a pacemaker / defibrillator.  The patient does not have a heart valve / joint replacement.    The patient is on blood thinners.  The patient does not have a history of hepatitis B or C.  The patient does not have a history of HIV.  The patient does not have a history of immunosuppression (e.g. organ transplantation, malignancy, medications)    Review of Systems:  No other skin or systemic complaints other than what is documented elsewhere in the note.    MEDICAL HISTORY: clinically relevant history including significant past medical history, medications and allergies was reviewed and documented in Epic.    Objective   Well appearing patient in no apparent distress; mood and affect are within normal limits.  Vital signs: See record.  Noted on the Right Anterior Temple  Is a 0.7 x 0.6 cm scar        The patient confirmed the identified site.    Discussion:  The nature of the diagnosis was explained. The lesion is a skin cancer.  It has a risk of local growth and distant spread. The condition is associated with sun exposure.  Warning signs of non-melanoma skin cancer discussed. Patient was instructed to perform monthly self skin examination.  We recommended that the patient have regular full skin exams given an increased risk of subsequent skin cancers. The patient was instructed to " use sun protective behaviors including use of broad spectrum sunscreens and sun protective clothing to reduce risk of skin cancers.      Risks, benefits, side effects of Mohs surgery were discussed with patient and the patient voiced understanding.  It was explained that even though the cure rate of Mohs is very high it is not 100%. Risks of surgery including but not limited to bleeding, infection, numbness, nerve damage, and scar were reviewed.  Discussion included wound care requirements, activity restrictions, likely scar outcome and time to heal.     After Mohs surgery, the defect may need to be repaired surgically and the scar may be longer than the original lesion.  Reconstruction options, risks, and benefits were reviewed including second intention healing, linear repair (4-1 ratio was explained), local flaps, skin grafts, cartilage grafts and interpolation flaps (the need for multiple surgeries was explained). Possible outcomes were reviewed including likely scar appearance, failure of flap survival, infection, bleeding and the need for revision surgery.     The pathology was reviewed, the photograph was reviewed, and the referring physician's note was reviewed.    Patient elected for Mohs surgery.

## 2025-02-03 ENCOUNTER — APPOINTMENT (OUTPATIENT)
Dept: RADIOLOGY | Facility: HOSPITAL | Age: 80
End: 2025-02-03
Payer: MEDICARE

## 2025-02-03 ENCOUNTER — HOSPITAL ENCOUNTER (OUTPATIENT)
Dept: RADIOLOGY | Facility: HOSPITAL | Age: 80
Discharge: HOME | End: 2025-02-03
Payer: MEDICARE

## 2025-02-03 ENCOUNTER — HOSPITAL ENCOUNTER (OUTPATIENT)
Dept: RADIATION ONCOLOGY | Facility: HOSPITAL | Age: 80
Setting detail: RADIATION/ONCOLOGY SERIES
Discharge: HOME | End: 2025-02-03
Payer: MEDICARE

## 2025-02-03 ENCOUNTER — APPOINTMENT (OUTPATIENT)
Dept: RADIATION ONCOLOGY | Facility: HOSPITAL | Age: 80
End: 2025-02-03
Payer: MEDICARE

## 2025-02-03 VITALS
WEIGHT: 157.3 LBS | BODY MASS INDEX: 23.23 KG/M2 | DIASTOLIC BLOOD PRESSURE: 70 MMHG | OXYGEN SATURATION: 92 % | RESPIRATION RATE: 18 BRPM | SYSTOLIC BLOOD PRESSURE: 114 MMHG | TEMPERATURE: 97.5 F | HEART RATE: 85 BPM

## 2025-02-03 DIAGNOSIS — C34.32 MALIGNANT NEOPLASM OF LOWER LOBE OF LEFT LUNG (MULTI): ICD-10-CM

## 2025-02-03 LAB — GLUCOSE BLD MANUAL STRIP-MCNC: 98 MG/DL (ref 74–99)

## 2025-02-03 PROCEDURE — A9552 F18 FDG: HCPCS

## 2025-02-03 PROCEDURE — 78815 PET IMAGE W/CT SKULL-THIGH: CPT | Mod: PET TUMOR SUBSQ TX STRATEGY | Performed by: NUCLEAR MEDICINE

## 2025-02-03 PROCEDURE — 3430000001 HC RX 343 DIAGNOSTIC RADIOPHARMACEUTICALS

## 2025-02-03 PROCEDURE — 99214 OFFICE O/P EST MOD 30 MIN: CPT | Performed by: STUDENT IN AN ORGANIZED HEALTH CARE EDUCATION/TRAINING PROGRAM

## 2025-02-03 PROCEDURE — G2211 COMPLEX E/M VISIT ADD ON: HCPCS | Performed by: STUDENT IN AN ORGANIZED HEALTH CARE EDUCATION/TRAINING PROGRAM

## 2025-02-03 PROCEDURE — 82947 ASSAY GLUCOSE BLOOD QUANT: CPT

## 2025-02-03 PROCEDURE — 2500000004 HC RX 250 GENERAL PHARMACY W/ HCPCS (ALT 636 FOR OP/ED)

## 2025-02-03 PROCEDURE — 78815 PET IMAGE W/CT SKULL-THIGH: CPT | Mod: PS

## 2025-02-03 RX ORDER — FLUDEOXYGLUCOSE F 18 200 MCI/ML
11.98 INJECTION, SOLUTION INTRAVENOUS
Status: COMPLETED | OUTPATIENT
Start: 2025-02-03 | End: 2025-02-03

## 2025-02-03 RX ORDER — HEPARIN 100 UNIT/ML
500 SYRINGE INTRAVENOUS AS NEEDED
Status: DISCONTINUED | OUTPATIENT
Start: 2025-02-03 | End: 2025-02-04 | Stop reason: HOSPADM

## 2025-02-03 RX ADMIN — FLUDEOXYGLUCOSE F 18 11.98 MILLICURIE: 200 INJECTION, SOLUTION INTRAVENOUS at 11:55

## 2025-02-03 RX ADMIN — Medication 500 UNITS: at 12:00

## 2025-02-03 SDOH — ECONOMIC STABILITY: INCOME INSECURITY: IN THE LAST 12 MONTHS, WAS THERE A TIME WHEN YOU WERE NOT ABLE TO PAY THE MORTGAGE OR RENT ON TIME?: NO

## 2025-02-03 SDOH — HEALTH STABILITY: PHYSICAL HEALTH: ON AVERAGE, HOW MANY DAYS PER WEEK DO YOU ENGAGE IN MODERATE TO STRENUOUS EXERCISE (LIKE A BRISK WALK)?: 0 DAYS

## 2025-02-03 SDOH — HEALTH STABILITY: PHYSICAL HEALTH: ON AVERAGE, HOW MANY MINUTES DO YOU ENGAGE IN EXERCISE AT THIS LEVEL?: 0 MIN

## 2025-02-03 ASSESSMENT — SOCIAL DETERMINANTS OF HEALTH (SDOH)
WITHIN THE LAST YEAR, HAVE YOU BEEN AFRAID OF YOUR PARTNER OR EX-PARTNER?: NO
WITHIN THE LAST YEAR, HAVE TO BEEN RAPED OR FORCED TO HAVE ANY KIND OF SEXUAL ACTIVITY BY YOUR PARTNER OR EX-PARTNER?: NO
WITHIN THE LAST YEAR, HAVE YOU BEEN HUMILIATED OR EMOTIONALLY ABUSED IN OTHER WAYS BY YOUR PARTNER OR EX-PARTNER?: NO
WITHIN THE LAST YEAR, HAVE YOU BEEN KICKED, HIT, SLAPPED, OR OTHERWISE PHYSICALLY HURT BY YOUR PARTNER OR EX-PARTNER?: NO
HOW HARD IS IT FOR YOU TO PAY FOR THE VERY BASICS LIKE FOOD, HOUSING, MEDICAL CARE, AND HEATING?: NOT HARD AT ALL

## 2025-02-03 ASSESSMENT — ACTIVITIES OF DAILY LIVING (ADL)
WALKS IN HOME: NEEDS ASSISTANCE
ADEQUATE_TO_COMPLETE_ADL: YES
PATIENT'S MEMORY ADEQUATE TO SAFELY COMPLETE DAILY ACTIVITIES?: YES
DRESSING YOURSELF: NEEDS ASSISTANCE
HEARING - RIGHT EAR: FUNCTIONAL
GROOMING: NEEDS ASSISTANCE
HEARING - LEFT EAR: FUNCTIONAL
ASSISTIVE_DEVICE: WALKER
TOILETING: NEEDS ASSISTANCE
BATHING: NEEDS ASSISTANCE
FEEDING YOURSELF: NEEDS ASSISTANCE
JUDGMENT_ADEQUATE_SAFELY_COMPLETE_DAILY_ACTIVITIES: YES

## 2025-02-03 ASSESSMENT — LIFESTYLE VARIABLES
HOW OFTEN DO YOU HAVE SIX OR MORE DRINKS ON ONE OCCASION: NEVER
AUDIT-C TOTAL SCORE: 0
SKIP TO QUESTIONS 9-10: 1
HOW MANY STANDARD DRINKS CONTAINING ALCOHOL DO YOU HAVE ON A TYPICAL DAY: PATIENT DOES NOT DRINK
HOW OFTEN DO YOU HAVE A DRINK CONTAINING ALCOHOL: NEVER

## 2025-02-03 ASSESSMENT — ENCOUNTER SYMPTOMS
GASTROINTESTINAL NEGATIVE: 1
CARDIOVASCULAR NEGATIVE: 1
LOSS OF SENSATION IN FEET: 1
OCCASIONAL FEELINGS OF UNSTEADINESS: 1
NUMBNESS: 1
ENDOCRINE NEGATIVE: 1
APPETITE CHANGE: 1
PSYCHIATRIC NEGATIVE: 1
EYES NEGATIVE: 1
EXTREMITY WEAKNESS: 1
DEPRESSION: 0
RESPIRATORY NEGATIVE: 1
HEMATOLOGIC/LYMPHATIC NEGATIVE: 1

## 2025-02-03 ASSESSMENT — PAIN SCALES - GENERAL: PAINLEVEL_OUTOF10: 0-NO PAIN

## 2025-02-03 NOTE — PROGRESS NOTES
Radiation Oncology Nursing Note    Pain: The patient's current pain level was assessed.  They report currently having a pain of 0 out of 10.  They feel their pain is under control without the use of pain medications.    Review of Systems:  Review of Systems   Constitutional:  Positive for appetite change.        Eats about 2/3 of daily food. Takes boost 240 alondra   HENT:  Negative.     Eyes: Negative.    Respiratory: Negative.     Cardiovascular: Negative.    Gastrointestinal: Negative.    Endocrine: Negative.    Genitourinary: Negative.     Musculoskeletal:  Positive for gait problem.   Skin: Negative.    Neurological:  Positive for extremity weakness, gait problem and numbness.   Hematological: Negative.    Psychiatric/Behavioral: Negative.

## 2025-02-03 NOTE — PROGRESS NOTES
"Radiation Oncology Follow-Up    Patient Name:  Wade Montgomery  MRN:  [id number]  :  1945    Referring Provider: Giovanni Fitzgerald MD, *  Primary Care Provider: Braydon Austin MD  Care Team: Patient Care Team:  Braydon Austin MD as PCP - General (Internal Medicine)  Braydon Austin MD as PCP - [employer] PCP  Mynor Giraldo MD as Consulting Physician (Hematology and Oncology)    Date of Service: 2/3/2025    SUBJECTIVE  History of Present Illness:  Wade Montgomery \"Bill\" is a 79 y.o. male who was previously seen at the [facility] Department of Radiation Oncology for routine follow up.    79 year old male with a history of bladder cancer and prostate cancer s/p prostacystectomy with ureteral diversion and ileal conduit creation, who was found to have a newly diagnosed Stage I NSCLC of the LLL s/p SBRT 55Gy/5fx on 3/21/22. The patient is here as a follow-up post radiation treatment to the lungs.      INTERVAL HISTORY  Since we last saw Wade Montgomery in clinic on 2024, he has felt well but continues to have fatigue. He reports his neurodegenerative symptoms have been stable. Also endorses dry cough, sometimes wakes him up in the middle of night. He also reports mild upper back pain, more to the left side. He had a fall in 2024, imaging shows post radiation change. He also has a newly found right anterior temple BCC s/p Mohs surgery by Dr. Hadley on 2025. Denies any new motion or sensation changes since last time being seen.       His PET/CT today shows emphysematous changes with bilateral pleural parenchymal scarring with hypermetabolic opacity within RUL, likely inflammatory/infective. Mild avidity within RML and RLL, could be inflammatory changes.     Treatment Rendered:   No radiation treatments to show. (Treatments may have been administered in another system.)   SBRT to LLL 55Gy in 5fx on 3/21/2022    Review of Systems:   Review of " Systems - Oncology  See RN notes  Performance Status:   The Karnofsky performance scale today is 70, Cares for self; unable to carry on normal activity or to do active work (ECOG equivalent 1).       OBJECTIVE  Vital Signs:  There were no vitals taken for this visit.  Physical Exam  Constitutional:       Appearance: Normal appearance.   HENT:      Head: Normocephalic and atraumatic.   Cardiovascular:      Rate and Rhythm: Normal rate.   Neurological:      Mental Status: He is alert and oriented to person, place, and time. Mental status is at baseline.      Sensory: No sensory deficit.      Motor: Weakness present.      Gait: Gait abnormal.        === 02/03/25 ===    NM PET CT LUNG CA STAGING (Preliminary)  This result has not been signed. Information might be incomplete.    - Impression -  1. Emphysematous changes with bilateral pleural-parenchymal scarring  with hypermetabolic patchy opacity within right upper lobe, likely  inflammatory/infective. Recommended follow-up with diagnostic CT  chest to document interval resolution/stability as a neoplastic  process cannot be excluded.  2. Mildly hypermetabolic ground-glass opacities within right middle  and lower lobes which could be related to inflammatory changes.  However attention on follow-up imaging is recommended.  3. No focal hypermetabolic moises or distant metastasis.    I personally reviewed the images/study and I agree with the findings  as stated by Edmond Kahn MD.  This study was interpreted at  [facility], [place], [place].      Dictation workstation:   [id number]    === 09/02/24 ===    CT CHEST ABDOMEN PELVIS WO CONTRAST    - Impression -  1.Emphysematous changes with bilateral parenchymal scarring, LEFT  greater than RIGHT. LEFT lower lobe consolidation persists, slightly  increased in size compared prior examination.  2.RIGHT adrenal adenoma.  3.Prior cystectomy with creation of ileal conduit.  4.Cholelithiasis  without evidence of acute cholecystitis.  5.Chronic compression fracture of the T8 vertebral body.  6.Degenerative changes of the lumbar spine with concern for  impingement of exiting nerves at L4-5 and L5-S1.  Signed by Stanislav Oleary II, MD    ASSESSMENT:   Wade Montgomery is a 78 year old male with a history of bladder cancer and prostate cancer s/p prostacystectomy with ureteral diversion and ileal conduit creation, who was found to have a newly diagnosed Stage I NSCLC of the LLL s/p SBRT 55Gy/5fx on 3/21/22. The patient is here as a 2.5 year follow-up post radiation treatment to the lungs.      PLAN: The area that was biopsy proven and treated with radiation. Which was the area of concern showed no PET uptake. There is a new area of uptake in the right lung with consolidation. Based on patient's history and previous lung changes - this is likely a infectious/inflammatory process. I have asked Wade Montgomery to please return to clinic in 2 month's time with CT chest wo contrast to follow up consolidative changes.      PAIN PLAN: The patient reports their pain is well-controlled on their current regimen.  Their pain regimen is currently managed by Primary Care.       DISEASE STATUS: Controlled  NEW METACHRONOUS CANCER: No     Thank you for the opportunity to participate in the ongoing care of this pleasant man.      Xochilt Odell MD PhD,   PGY-2, Radiation Oncology

## 2025-02-13 ENCOUNTER — APPOINTMENT (OUTPATIENT)
Dept: NEUROLOGY | Facility: CLINIC | Age: 80
End: 2025-02-13
Payer: MEDICARE

## 2025-02-14 ENCOUNTER — TELEMEDICINE (OUTPATIENT)
Dept: NEUROLOGY | Facility: CLINIC | Age: 80
End: 2025-02-14
Payer: MEDICARE

## 2025-02-14 VITALS — WEIGHT: 157 LBS | BODY MASS INDEX: 23.25 KG/M2 | HEIGHT: 69 IN

## 2025-02-14 DIAGNOSIS — G62.9 POLYNEUROPATHY: ICD-10-CM

## 2025-02-14 DIAGNOSIS — F06.8 PSYCHOSIS DUE TO PARKINSON DISEASE: Primary | ICD-10-CM

## 2025-02-14 DIAGNOSIS — G20.A2 PARKINSON'S DISEASE WITHOUT DYSKINESIA, WITH FLUCTUATING MANIFESTATIONS: ICD-10-CM

## 2025-02-14 DIAGNOSIS — G20.A1 PSYCHOSIS DUE TO PARKINSON DISEASE: Primary | ICD-10-CM

## 2025-02-14 PROCEDURE — 1126F AMNT PAIN NOTED NONE PRSNT: CPT | Performed by: PSYCHIATRY & NEUROLOGY

## 2025-02-14 PROCEDURE — 99213 OFFICE O/P EST LOW 20 MIN: CPT | Performed by: PSYCHIATRY & NEUROLOGY

## 2025-02-14 PROCEDURE — 1160F RVW MEDS BY RX/DR IN RCRD: CPT | Performed by: PSYCHIATRY & NEUROLOGY

## 2025-02-14 PROCEDURE — 1036F TOBACCO NON-USER: CPT | Performed by: PSYCHIATRY & NEUROLOGY

## 2025-02-14 PROCEDURE — G2211 COMPLEX E/M VISIT ADD ON: HCPCS | Performed by: PSYCHIATRY & NEUROLOGY

## 2025-02-14 PROCEDURE — 1123F ACP DISCUSS/DSCN MKR DOCD: CPT | Performed by: PSYCHIATRY & NEUROLOGY

## 2025-02-14 PROCEDURE — 1157F ADVNC CARE PLAN IN RCRD: CPT | Performed by: PSYCHIATRY & NEUROLOGY

## 2025-02-14 PROCEDURE — 1159F MED LIST DOCD IN RCRD: CPT | Performed by: PSYCHIATRY & NEUROLOGY

## 2025-02-14 ASSESSMENT — PAIN SCALES - GENERAL: PAINLEVEL_OUTOF10: 0-NO PAIN

## 2025-02-14 NOTE — PROGRESS NOTES
Subjective     Wade Montgomery is a 79 y.o. year old male here for parkinsonism follow up- virtual visit via audio/tele.    Arrives with wife who provides more history.    Parkinson's Disease  Arrives with wife today.   He has hallucinations , sees people that are not there.  Happens when he is tired. He may ask his wife what his name. He may think a foot is an animal.  He believes they have two houses and two sets of furniture.   He is falling a lot more often.  Uses a walker.   Sertraline 75mg daily helped his anxiety.   Melatonin 10mg at night may help.  He goes to bed at 8am and wakes up at 4am.   He takes Sinemet 25-100mg 1.5 tabs/ 1.5 tabs/ 1.5 tabs/ 1 tab.   4pm his confusion starts.    Hand and finger flexion is worse.     Qamar scan + abnormal and consistent with PD.  MRI Brain was normal.    Takes Sinemet 25-100mg 1.5 tabs / 1.5 tabs/ 1.5 tabs/ 1 tab    He has CIPD. IVIG treatments he goes to infusion center at [facility].  On prednisone.   Also noticed fine motor movements are impaired, facial expressions are masked more. Voice is softer.     FH: no PD in family. father had neuropathy. spine issues in family. 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   PMH: scoliosis. neuropathy.      hx of craniotomy and SDH 2012 -secondary from acting out is dreams ( hit his night stand)     Review of Systems    Patient Active Problem List   Diagnosis    Bladder cancer (Multi)    Chronic inflammatory demyelinating polyneuropathy (Multi)    Coronary artery disease involving native coronary artery of native heart without angina pectoris    Prostate cancer (Multi)    Unstable gait    Lung cancer (Multi)    Abnormal ejaculation    Abnormal stress test    Acquired involutional ptosis of both eyelids    Arthritis of knee, right    Astigmatism    Hyperopia    Baker cyst    Basal cell carcinoma of skin of right upper limb, including shoulder    Basal cell carcinoma of skin of other parts of  face    Bilateral impacted cerumen    Bilateral partial vocal cord paralysis    Blepharitis, left eye    Calf swelling    Carcinoma in situ of skin of scalp and neck    Choroidal nevus of left eye    Dermatochalasis    Disturbance of skin sensation    CHAUDHARI (dyspnea on exertion)    Dysphagia    Eustachian tube dysfunction    Glossopharyngeal nerve disorder    Peripheral neuropathy    Glottic insufficiency    Ground glass opacity present on imaging of lung    Hemangioma of skin and subcutaneous tissue    Hematuria    Hemodynamic instability    History of bladder cancer    Personal history of other malignant neoplasm of skin    Hoarseness of voice    Hypovolemia    IgM monoclonal gammopathy of uncertain significance    MGUS (monoclonal gammopathy of unknown significance)    Kappa light chain disease (Multi)    Left inguinal hernia    Lichen simplex chronicus    Lung nodule, multiple    Malignant neoplasm of lower lobe of left lung (Multi)    Melanocytic nevi of scalp and neck    Melanocytic nevi of trunk    Melanocytic nevi of unspecified lower limb, including hip    Melanocytic nevi of unspecified upper limb, including shoulder    Melanocytic nevi of other parts of face    Memory changes    Mixed hyperlipidemia    Movement disorder    Neoplasm of uncertain behavior of skin    Actinic keratosis    Inflamed seborrheic keratosis    Other seborrheic keratosis    Skin changes due to chronic exposure to nonionizing radiation, unspecified    Parkinsonism (Multi)    PCO (posterior capsular opacification), right    Pseudophakia    Scar condition and fibrosis of skin    Seborrheic dermatitis, unspecified    Sensorineural hearing loss, bilateral    Spinal stenosis of lumbar region at multiple levels    Spinal stenosis    SPK (superficial punctate keratitis)    Tongue fasciculation    Trigger finger, acquired    Trigger middle finger of right hand    Unspecified voice and resonance disorder    Unstable balance    Urinary retention     Weakness of both hands    Weakness of extremity    Xerosis cutis    Malignant neoplasm of overlapping sites of bladder (Multi)    Urothelial cancer (Multi)    SCC (squamous cell carcinoma)    Anemia    Chest pain    Chronic inflammatory demyelinating polyneuritis (Multi)    Polyneuropathy    Erectile disorder    Monoclonal gammopathy    Idiopathic progressive neuropathy    Secondary malignant neoplasm of left lung (Multi)    Other specified disorders of adrenal gland    Disease due to severe acute respiratory syndrome coronavirus 2 (SARS-CoV-2)    Constipation    Acute urinary tract infection    Eye tearing, bilateral    Abnormal findings on diagnostic imaging of lung    Keratoconjunctivitis sicca of both eyes not specified as Sjogren's    Family history of glaucoma    Hyperopia, bilateral    Presbyopia     Past Medical History:   Diagnosis Date    Acute pain in scrotum 03/18/2024    Bicipital tendinitis, left shoulder 02/27/2014    Biceps tendonitis on left    CIDP with CNS overlap (chronic inflammatory demyelinating polyneuritis) (Multi)     Contact with and (suspected) exposure to unspecified communicable disease 10/24/2019    Exposure to communicable disease    Dry eye syndrome of bilateral lacrimal glands     Dry eye syndrome of both eyes    Encounter for general adult medical examination without abnormal findings 08/19/2019    Initial Medicare annual wellness visit    Excessive tear production 03/18/2024    Finger injury 03/18/2024    History of subdural hematoma 03/18/2024    Nasal congestion 03/18/2024    Other chest pain 07/14/2015    Chest pain, muscular    Pain in left hip 04/20/2022    Left hip pain    Pain in left knee 04/20/2022    Left knee pain    Pain in scrotum 03/18/2024    Pain of right lower extremity 03/18/2024    Pelvic and perineal pain 08/19/2019    Suprapubic pressure    Personal history of other diseases of the circulatory system     History of subdural hematoma    Personal history of  other diseases of the digestive system 05/10/2018    History of rectal bleeding    Personal history of other diseases of the musculoskeletal system and connective tissue 02/09/2022    History of low back pain    Personal history of other diseases of the respiratory system 02/27/2014    History of acute bronchitis    Personal history of other diseases of urinary system     History of hematuria    Personal history of other drug therapy     History of influenza vaccination    Personal history of other infectious and parasitic diseases 09/09/2019    History of tinea cruris    Personal history of other infectious and parasitic diseases 03/30/2017    History of tinea cruris    Personal history of other specified conditions 09/09/2020    History of flank pain    Personal history of other specified conditions 03/03/2020    History of nasal congestion    Personal history of other specified conditions 02/14/2022    History of dysuria    Personal history of other specified conditions 12/10/2019    History of epigastric pain    Personal history of urinary calculi 07/24/2019    History of urinary stone    Rash and other nonspecific skin eruption 01/06/2017    Rash    Retention of urine, unspecified 11/25/2020    Urinary retention with incomplete bladder emptying    Shoulder pain 03/18/2024    Strain of muscle, fascia and tendon of lower back, initial encounter 07/14/2015    Lumbar strain    Tear film insufficiency 03/18/2024    Tinea cruris 03/18/2024    Trochanteric bursitis, left hip 09/22/2021    Greater trochanteric bursitis of left hip     Past Surgical History:   Procedure Laterality Date    CT GUIDED PERCUTANEOUS BIOPSY LUNG  2/9/2022    CT GUIDED PERCUTANEOUS BIOPSY LUNG 2/9/2022 [facility]    OTHER SURGICAL HISTORY  02/13/2020    Hernia repair    OTHER SURGICAL HISTORY  02/13/2020    Knee surgery    OTHER SURGICAL HISTORY  02/13/2020    Trigger finger repair    OTHER SURGICAL HISTORY  01/13/2020    Craniotomy     OTHER SURGICAL HISTORY  01/13/2020    Carpal tunnel surgery    OTHER SURGICAL HISTORY  10/27/2020    Sinus surgery    OTHER SURGICAL HISTORY  02/16/2022    Cataract surgery    OTHER SURGICAL HISTORY  01/26/2021    Nerve biospy     Social History     Tobacco Use    Smoking status: Never     Passive exposure: Past    Smokeless tobacco: Never   Substance Use Topics    Alcohol use: Yes     Comment: rare     family history includes Dementia in his maternal grandmother and mother; Diabetes type I in his son; Heart attack in his father; Liver cancer in his father; Macular degeneration in his father; Scoliosis in his mother; peripheral neuropathy in his father.    Current Outpatient Medications:     acetaminophen (TYLENOL ORAL), Take 2 tablets by mouth if needed (Mild pain)., Disp: , Rfl:     aspirin 81 mg EC tablet, Take 1 tablet (81 mg) by mouth once daily., Disp: , Rfl:     carbidopa-levodopa (Sinemet)  mg tablet, 1.5 tabs at 8am / 1.5 tabs at 12pm / 1.5 tabs at 4pm and 1 tab at 8pm, Disp: 450 tablet, Rfl: 3    carboxymethylcellulose (Refresh Plus) 0.5 % ophthalmic solution, Administer into affected eye(s) 4 times a day., Disp: , Rfl:     diclofenac sodium (Voltaren) 1 % gel gel, Apply 4.5 inches (4 g) topically 4 times a day.  APPLY TO LOWER EXTREMITIES. DO NOT APPLY MORE THAN 16 GM DAILY TO ANY ONE AFFECTED JOINT., Disp: , Rfl:     diphenhydramine HCl (BENADRYL ORAL), Take 1 tablet by mouth if needed (Seasonal allergies)., Disp: , Rfl:     docusate sodium (Colace) 100 mg capsule, Take 2 capsules (200 mg) by mouth once daily., Disp: , Rfl:     fluoride, sodium, (Prevident 5000 Booster) 1.1 % dental paste, USE TO BRUSH ON TEETH IN PLACE OF TOOTHPASTE TWICE A DAY., Disp: , Rfl:     fluticasone (Flonase) 50 mcg/actuation nasal spray, Administer 2 sprays into each nostril if needed., Disp: , Rfl:     Gammaplex 10 % solution, , Disp: , Rfl:     ibuprofen 600 mg tablet, Take 1 tablet (600 mg) by mouth every 6 hours if  "needed for mild pain (1 - 3). States takes 200 mg, Disp: , Rfl:     ketoconazole (NIZOral) 2 % cream, Apply twice daily to affected areas of face, Disp: 60 g, Rfl: 11    ketoconazole (NIZOral) 2 % shampoo, Ketoconazole 2 % External Shampoo Quantity: 120  Refills: 0  Start: 15-Sep-2021, Disp: , Rfl:     melatonin 5 mg tablet, Take 1 tablet (5 mg) by mouth once daily at bedtime., Disp: , Rfl:     multivitamin tablet, Take 1 tablet by mouth once daily. Take with food, Disp: , Rfl:     pimavanserin (Nuplazid) 34 mg capsule, Take 34 mg by mouth once daily., Disp: 90 capsule, Rfl: 3    polyethylene glycol (Miralax) 17 gram/dose powder, Mix 17 g of powder and drink once daily., Disp: , Rfl:     predniSONE (Deltasone) 5 mg tablet, Take 1 tablet (5 mg) by mouth once daily., Disp: 90 tablet, Rfl: 3    psyllium (Metamucil) 3.4 gram packet, Take 1 packet by mouth once daily., Disp: , Rfl:     Restasis 0.05 % ophthalmic emulsion, Administer 1 drop into both eyes every 12 hours., Disp: 180 each, Rfl: 3    rosuvastatin (Crestor) 5 mg tablet, Take 1 tablet (5 mg) by mouth once daily., Disp: 90 tablet, Rfl: 3    sertraline (Zoloft) 50 mg tablet, Take 1.5 tablets (75 mg) by mouth once daily., Disp: 135 tablet, Rfl: 3  Allergies   Allergen Reactions    Oxycodone Other and Hallucinations     Psychosis    Penicillin V Other     Ht 1.753 m (5' 9\")   Wt 71.2 kg (157 lb)   BMI 23.18 kg/m²   Neurological Exam/Physical Exam:  Virtual exam:   Constitutional: General appearance: no acute distress.    Elderly appearing. Speech is stuttering, slower.   Soft voice.  Pupils equal.  Face symmetric.  He has moderate bradykinesia. freezing of gait. Left leg slightly slower.        Labs:    CBC:   Lab Results   Component Value Date    WBC 5.9 09/26/2024    HGB 13.0 (L) 09/26/2024    HCT 38.1 (L) 09/26/2024     09/26/2024     BMP:   Lab Results   Component Value Date     09/26/2024    K 4.0 09/26/2024     09/26/2024    CO2 30 " 09/26/2024    BUN 12 09/26/2024    CREATININE 0.75 09/26/2024    CALCIUM 8.7 09/26/2024    MG 2.01 12/28/2020    PHOS 2.4 (L) 12/28/2020     LFT:   Lab Results   Component Value Date    ALKPHOS 67 09/26/2024    BILITOT 0.6 09/26/2024    BILIDIR 0.3 12/28/2020    PROT 7.0 09/26/2024    ALBUMIN 3.9 09/26/2024    ALT 5 (L) 09/26/2024    AST 16 09/26/2024         Assessment/Plan   Problem List Items Addressed This Visit    None    Atypical features. 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   Spasticity and hallucinations /confusion are worse.   Steroid may be causing more psychosis and confusion/sleep issues -  I will ask Dr. Robertson if that can be stopped.   Psychiatry follow up will be helpful.     Due to hallucinations- STOP Sinemet does at 8pm, lower the 4pm dose to 1 tab-   New Sinemet schedule - Take 1.5 tabs at 8am / 1.5 tabs at 12pm and 1 tab at 4pm.    CIPD neuropathy, hx of brain bleed as well.   This is a chronic neurologic condition that requires ongoing care and monitoring. This is a complex, serious condition that needs long term care going forward. Between myself and the patient we will be changing direction of care depending on responses to treatment.   Today we discussed medication options, non medication options for management and various other symptoms that are in relation to this disease.  I will continue to be involved in the care of this patient.       Total time with patient encounter: 23 minutes.

## 2025-02-14 NOTE — PATIENT INSTRUCTIONS
"It was a pleasure seeing you today.     Due to hallucinations- STOP Sinemet does at 8pm, lower the 4pm dose to 1 tab-   New Sinemet schedule - Take 1.5 tabs at 8am / 1.5 tabs at 12pm and 1 tab at 4pm.    Please make a follow up appointment in 4 months.  You may also schedule a phone or virtual visit sooner on a Friday morning with me as needed before the next clinic appointment.     For any urgent issues or needing to speak to a medical assistant please call [phone], option 6 during our office hours Monday-Friday 8am-4pm, and leave a voicemail with your concern.  My office will try to reach back you as soon as possible within 24 (business) hours.  If you have an emergency please call 911 or visit a local urgent care or nearest emergency room.      Please understand that [employer] is a useful communication tool for simple \"normal\" results or a refill request but I would not recommend using this tool for emergent or urgent issues or for conversations with me.  I am happy to ask my staff to rearrange a follow up visit or a virtual visit sooner than requested if appropriate for your care.    "

## 2025-02-17 ENCOUNTER — OFFICE VISIT (OUTPATIENT)
Dept: HEMATOLOGY/ONCOLOGY | Facility: CLINIC | Age: 80
End: 2025-02-17
Payer: MEDICARE

## 2025-02-17 VITALS
TEMPERATURE: 96.8 F | HEART RATE: 79 BPM | BODY MASS INDEX: 23.17 KG/M2 | OXYGEN SATURATION: 97 % | DIASTOLIC BLOOD PRESSURE: 80 MMHG | HEIGHT: 69 IN | RESPIRATION RATE: 16 BRPM | SYSTOLIC BLOOD PRESSURE: 131 MMHG

## 2025-02-17 DIAGNOSIS — C34.90 NON-SMALL CELL LUNG CANCER, UNSPECIFIED LATERALITY (MULTI): ICD-10-CM

## 2025-02-17 DIAGNOSIS — D47.2 MGUS (MONOCLONAL GAMMOPATHY OF UNKNOWN SIGNIFICANCE): ICD-10-CM

## 2025-02-17 LAB
ALBUMIN SERPL BCP-MCNC: 4.1 G/DL (ref 3.4–5)
ALP SERPL-CCNC: 58 U/L (ref 33–136)
ALT SERPL W P-5'-P-CCNC: 4 U/L (ref 10–52)
ANION GAP SERPL CALC-SCNC: 8 MMOL/L (ref 10–20)
AST SERPL W P-5'-P-CCNC: 20 U/L (ref 9–39)
BASOPHILS # BLD AUTO: 0.01 X10*3/UL (ref 0–0.1)
BASOPHILS NFR BLD AUTO: 0.2 %
BILIRUB SERPL-MCNC: 0.5 MG/DL (ref 0–1.2)
BUN SERPL-MCNC: 13 MG/DL (ref 6–23)
CALCIUM SERPL-MCNC: 9.5 MG/DL (ref 8.6–10.3)
CHLORIDE SERPL-SCNC: 101 MMOL/L (ref 98–107)
CO2 SERPL-SCNC: 29 MMOL/L (ref 21–32)
CREAT SERPL-MCNC: 0.73 MG/DL (ref 0.5–1.3)
EGFRCR SERPLBLD CKD-EPI 2021: >90 ML/MIN/1.73M*2
EOSINOPHIL # BLD AUTO: 0 X10*3/UL (ref 0–0.4)
EOSINOPHIL NFR BLD AUTO: 0 %
ERYTHROCYTE [DISTWIDTH] IN BLOOD BY AUTOMATED COUNT: 13.3 % (ref 11.5–14.5)
GLUCOSE SERPL-MCNC: 92 MG/DL (ref 74–99)
HCT VFR BLD AUTO: 40.4 % (ref 41–52)
HGB BLD-MCNC: 13.1 G/DL (ref 13.5–17.5)
IGG SERPL-MCNC: 1800 MG/DL (ref 700–1600)
IGM SERPL-MCNC: 388 MG/DL (ref 40–230)
IMM GRANULOCYTES # BLD AUTO: 0.01 X10*3/UL (ref 0–0.5)
IMM GRANULOCYTES NFR BLD AUTO: 0.2 % (ref 0–0.9)
LYMPHOCYTES # BLD AUTO: 1.17 X10*3/UL (ref 0.8–3)
LYMPHOCYTES NFR BLD AUTO: 20.5 %
MCH RBC QN AUTO: 31.1 PG (ref 26–34)
MCHC RBC AUTO-ENTMCNC: 32.4 G/DL (ref 32–36)
MCV RBC AUTO: 96 FL (ref 80–100)
MONOCYTES # BLD AUTO: 0.59 X10*3/UL (ref 0.05–0.8)
MONOCYTES NFR BLD AUTO: 10.3 %
NEUTROPHILS # BLD AUTO: 3.94 X10*3/UL (ref 1.6–5.5)
NEUTROPHILS NFR BLD AUTO: 68.8 %
PLATELET # BLD AUTO: 147 X10*3/UL (ref 150–450)
POTASSIUM SERPL-SCNC: 4.1 MMOL/L (ref 3.5–5.3)
PROT SERPL-MCNC: 7.8 G/DL (ref 6.4–8.2)
PROT SERPL-MCNC: 8.2 G/DL (ref 6.4–8.2)
RBC # BLD AUTO: 4.21 X10*6/UL (ref 4.5–5.9)
SODIUM SERPL-SCNC: 134 MMOL/L (ref 136–145)
WBC # BLD AUTO: 5.7 X10*3/UL (ref 4.4–11.3)

## 2025-02-17 PROCEDURE — 1159F MED LIST DOCD IN RCRD: CPT | Performed by: INTERNAL MEDICINE

## 2025-02-17 PROCEDURE — 86334 IMMUNOFIX E-PHORESIS SERUM: CPT | Mod: PORLAB | Performed by: INTERNAL MEDICINE

## 2025-02-17 PROCEDURE — 1157F ADVNC CARE PLAN IN RCRD: CPT | Performed by: INTERNAL MEDICINE

## 2025-02-17 PROCEDURE — 82784 ASSAY IGA/IGD/IGG/IGM EACH: CPT | Mod: PORLAB | Performed by: INTERNAL MEDICINE

## 2025-02-17 PROCEDURE — 84075 ASSAY ALKALINE PHOSPHATASE: CPT | Performed by: INTERNAL MEDICINE

## 2025-02-17 PROCEDURE — 84155 ASSAY OF PROTEIN SERUM: CPT | Mod: PORLAB | Performed by: INTERNAL MEDICINE

## 2025-02-17 PROCEDURE — 99214 OFFICE O/P EST MOD 30 MIN: CPT | Performed by: INTERNAL MEDICINE

## 2025-02-17 PROCEDURE — 85025 COMPLETE CBC W/AUTO DIFF WBC: CPT | Performed by: INTERNAL MEDICINE

## 2025-02-17 PROCEDURE — 1123F ACP DISCUSS/DSCN MKR DOCD: CPT | Performed by: INTERNAL MEDICINE

## 2025-02-17 PROCEDURE — 83521 IG LIGHT CHAINS FREE EACH: CPT | Mod: PORLAB | Performed by: INTERNAL MEDICINE

## 2025-02-17 PROCEDURE — 1126F AMNT PAIN NOTED NONE PRSNT: CPT | Performed by: INTERNAL MEDICINE

## 2025-02-17 ASSESSMENT — PAIN SCALES - GENERAL: PAINLEVEL_OUTOF10: 0-NO PAIN

## 2025-02-17 NOTE — PATIENT INSTRUCTIONS
Follow up visit for history of monoclonal gammopathy of unknown significance.     Follow up with Dr. Giraldo in 6 months.     Please have lab work at least one week prior to follow up visit.

## 2025-02-17 NOTE — PROGRESS NOTES
Patient Visit Information:   Visit Type: Follow Up Visit   I am aggressive  History of Present Illness:      ID Statement:    MITCHELL COLE is a 78 year old Male        Interval History:    2/17/25  Mr. Cole returns today for follow-up of MGUS.  Since his last visit,   Hemoglobin, renal function have been stable no any other complaint.    He has some generalized weakness and fatigue, but denies any fever, chills, increased  shortness of breath, worsening neurologic symptoms, new musculoskeletal pain or other significant symptoms.  Appetite is good.  He is gaining weight.   Complaining of knee pain, history of fall  PET scan result discussed with patient    Past medical history: IgM MGUS : Work-up initiated in February 2020 for polyneuropathy showed elevated kappa/lambda light chain ratio of 2.58.  Urine ROSA MARIA negative.   5/27/2020 K/L ratio 2.69, normal IgG, IgA and IgM levels, SPEP/ROSA MARIA showed M protein 0.2 g/dL.  He was followed clinically.  Bone marrow biopsy not indicated.  12/7/2020 K/L ratio 2.04.  M protein 0.2 g/dL.   12/6/2021: K/L ratio slightly higher at 4.16.  M protein stable at 0.2 g/dL.  Normal CBC, creatinine, calcium.  6/23/2022: K/Lratio 3.95.  IgM stable 232.  M protein stable at 0.2 g/dL.  1/30/2023: SPEP/ROSA MARIA: Small, stable M protein 0.2 g/dL.  Kappa/lambda ratio slightly higher at 5.4.  (IgM level not done?)  July 28, 2023, M protein 0.2 g per DL, IgG 1650, kappa light chain 5.43, lambda light chain 1.04, ratio 5.22  2/12/24 , M spike 0.2, IgG level 1710, kappa light chain 6.1, lambda light chain 0.9, ratio 6.64  8/12/24 , M spike 0.4, Ig G level 2230, kappa light chain 5.4, lambda light chain 1.02, ratio 5.3  Non-small cell lung cancer:  CT chest done12/2021 showed 1.4 cm LLL lesion.  PET scan done in January 2022 showed hypermetabolic activity  in the LLL lesion only.  CT-guided biopsy done 2/9/2022 showed non-small cell carcinoma.  Because of his comorbidities, he had SBRT completing  "his fifth fraction on 3/21/2022 without complication.  9/29/2022: CT chest showed \"new\" subcentimeter pulmonary  nodules of uncertain significance.  11/18/2022: CT chest showed left lung density consistent with radiation pneumonitis, small subcentimeter nodules.  He was seen by medical oncologist, Dr. Mcgill and by his radiation oncologist, Dr. Fitzgerald.  Follow-up  scans are scheduled for 2/13/2023.  Hx. CIDP (chronic inflammatory demyelinating polyneuropathy), symptoms began 2008.  Sural nerve biopsy negative, negative anti-MAG Ab test; not attributed to MGUS. Receiving IVIG every 3 weeks.   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  History of cataract surgery, subdural hematoma, craniotomy, sinus surgery, hernia repair, knee surgery, carpal  tunnel surgery, spinal stenosis, trigger finger surgery, ED. COVID May 2022.  Non-small cell lung cancer LLL diagnosed February 2022, status post SBRT completed March 2022.  9/02/24 , CT chest, bilateral emphysematous changes, left basilar nodule measuring 3.4 cm, right adrenal adenoma  2/3/25, PET scan, right upper lobe abnormal metabolic activity, SUV 5.3 no obvious activity at the left lower lobe  History of bladder cancer and prostate cancer status post prostatectomy with urethral dilation and ilial conduit    Social history: Never smoker. Nondrinker.  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  Was exposed to carbon and other exhaust.   No significant secondhand smoke exposure.     Review of Systems:   Review of Systems:    Constitutional: No fever, chills, night sweats.  Mild fatigue.  Head and neck: No headaches or dizziness.  No pain, stiffness.   HEENT: No sore throat, sinusitis.  Hearing is adequate. Vison is adequate.   Cardiac: No chest pain, palpitations, lightheadedness.   Respiratory: No increased dyspnea, cough, hemoptysis.   GI: Appetite is " good, weight stable.  Occasional, mild constipation, but no diarrhea, change in bowel movements, melena, hematochezia. No abdominal pain, nausea, vomiting.   Genitourinary: No frequency, urgency.  No polyuria, dysuria, hematuria. s/p cystoprostatectomy, urinary diversion/ureterostomy.  Musculoskeletal: No worsening bone or joint pain.  Chronic arthralgias.  Endocrine: No diabetes, thyroid disease.   Skin: No rash, skin lesions, itching.   Neuromuscular: No lightheadedness, dizziness.  No history of seizure.  Motor and sensory deficit due to neuromuscular disorder/polyneuropathy.                 Allergies and Intolerances:       Allergies:         penicillin: Drug, Unknown, Active         oxycodone: Drug, Psychosis, Active     Outpatient Medication Profile:  * Patient Currently Takes Medications as of 10-Aug-2023 10:54 documented in Structured Notes         Colace 100 mg oral capsule : Last Dose Taken:  , 1 cap(s) orally 2 times a day, As Needed, Start Date: 07-Jul-2022         multivitamin Multiple Vitamins oral tablet: Last Dose Taken:  , 1 tab(s)  oral once a day         Melatonin 5 mg oral tablet: Last Dose Taken:  , 1 tab(s) orally once  a day (at bedtime) as needed         Aspir 81 oral delayed release tablet: Last Dose Taken:  , 1 tab(s) orally  once a day         fluticasone nasal: Last Dose Taken:  , 2 spray(s) nasal prn         MiraLax oral powder for reconstitution: Last Dose Taken:  , 1 dose(s)  orally once a day, As Needed         Refresh ophthalmic solution: Last Dose Taken:  , 1 drop(s) to each affected  eye 4 times a day, As Needed         Advil 200 mg oral tablet: Last Dose Taken:  , 1 tab(s) orally every 6  hours, As Needed         Gamunex-C 10% injectable solution: Last Dose Taken:  , 70 unit(s) injectable  every 3 weeks         diclofenac 1% topical gel: Last Dose Taken:           Metamucil: Last Dose Taken:  , as needed         ketoconazole 2% topical shampoo: Last Dose Taken:  , Apply topically   to affected area once         predniSONE 10 mg oral tablet: Last Dose Taken:  , 0.5 tab(s) orally once  a day         Restasis: Last Dose Taken:  , 1 drop(s) to each affected eye 2 times  a day         carbidopa-levodopa 23.75 mg-95 mg oral capsule, extended release: Last  Dose Taken:  , unknown dosage. 1.5 Am, 1.5 at lunch, 1 at dinner.             Medical History:         History of subdural hematoma: ICD-10: Z86.79, Status: Active         Repeated falls: ICD-10: R29.6, Status: Active         History of kidney stones: ICD-10: Z87.442, Status: Active         Back pain: ICD-10: M54.9, Status: Active         Scoliosis: ICD-10: M41.9, Status: Active         CIDP (chronic inflammatory demyelinating polyneuropathy):  ICD-10: G61.81, Status: Active       Surg History:         History of carpal tunnel release: ICD-10: Z98.890, Status:  Active         History of hernia surgery: ICD-10: Z98.890, Status: Active         History of surgery: ICD-10: Z98.890, Status: Active, Description:  [place]         History of craniotomy: ICD-10: Z98.890, Status: Active        Social History:   Social Substance History:  ·  Smoking Status never smoker (1)            Vitals and Measurements:   Vitals: Temp: 36.5  HR: 75  RR: 18  BP: 141/91  SPO2%:   98   Measurements: HT(cm): 173.4  WT(kg): 75.8  BSA: 1.91   BMI:  25.2   Last 3 Weights & Heights: Date:                           Weight/Scale Type:                    Height:   10-Aug-2023 10:26                75.8  kg                     173.4  cm  22-Feb-2023 10:53                76.5  kg                     173.4  cm  10-Feb-2023 10:24                76.8  kg                     173.4  cm         Physical Exam:      Constitutional: awake/alert/oriented x3.  Appears  chronically ill but in no acute distress.  Walks with a walker.      Eyes: PERRL, EOMI, clear sclera.      Head/Neck: Neck with normal movement.  No mass, adenopathy  or tenderness.  No JVD. Trachea midline.    Respiratory/Thorax: Thorax symmetric. Clear to auscultation.   No wheezes, rales, rhonchi.  Mediport in place right chest.   Cardiovascular: Regular, rate and rhythm. No murmur.   No rubs or gallops.   Gastrointestinal: Postsurgical changes noted.  Ureterostomy.   Nondistended.  Normal bowel sounds.  Soft, non-tender. No rebound or guarding.    No palpable hepatomegaly,      Musculoskeletal: ROM relatively intact.      Extremities: Unremarkable extremities; no cyanosis,      Neurological: Alert and oriented x3.      Lymphatic: No palpably significant lymphadenopathy  in the neck, supraclavicular or other areas.   Psychological: Appropriate mood and behavior.   Skin: Warm and dry, no rashes, no suspicious lesions.   Few resolving ecchymoses from venipunctures.      Labs     2/17/25) 4 mo ago  (9/26/24) 5 mo ago  (9/2/24) 5 mo ago  (8/23/24) 6 mo ago  (8/2/24) 7 mo ago  (7/12/24) 8 mo ago  (6/21/24)    WBC  4.4 - 11.3 x10*3/uL 5.7 5.9 6.3 5.4 4.7 4.2 Low  4.2 Low    RBC  4.50 - 5.90 x10*6/uL 4.21 Low  4.06 Low  4.10 Low  4.01 Low  3.92 Low  4.00 Low  4.03 Low    Hemoglobin  13.5 - 17.5 g/dL 13.1 Low  13.0 Low  13.2 Low  12.8 Low  12.8 Low  12.8 Low  12.9 Low    Hematocrit  41.0 - 52.0 % 40.4 Low  38.1 Low  38.4 Low  38.2 Low  37.7 Low  37.6 Low  38.0 Low    MCV  80 - 100 fL 96 94 94 95 96 94 94   MCH  26.0 - 34.0 pg 31.1 32.0 32.2 31.9 32.7 32.0 32.0   MCHC  32.0 - 36.0 g/dL 32.4 34.1 34.4 33.5 34.0 34.0 33.9   RDW  11.5 - 14.5 % 13.3 12.4 12.5 12.6 12.8 12.9 12.7   Platelets  150 - 450 x10*3/uL 147 Low                ·  Results               Assessment and Plan:      Assessment and Plan:   Assessment:    1. IgM MGUS, stable, w/o evidence of an associated lymphoproliferative disorder or myeloma.  M protein stable 0.2 g/dL.  K/L ratio slightly elevated.  No CRAB  signs.  CBC normal.      2.  CIDP. Given the negative anti-MAG Ab and nerve biopsy in the past, MGUS was not felt to be a cause for his polyneuropathy.       3.  History of prostate cancer, GG 3, T3a N0 M0, surveillance. s/p cystoprostatectomy 12/24/2020.  Followed by his urologist.     4.  History of bladder carcinoma in situ, status post resection December 2020.     5.  History left lung cancer stage I, diagnosed February 2022, status post SBRT.       6.  Subcentimeter pulmonary nodules, likely due to inflammatory disease, radiation change, rule out malignancy.     7.  Concern for aspiration.  Work-up in progress.      Plan: MGUS seem to be stable, hemoglobin is stable no evidence of hypercalcemia.  Lab result discussed with the patient.   Today serum protein electrophoresis IgG level is pending reevaluation after 6 months.  CAT scan of the chest did show right upper lung nodule left lower lung nodule and CAT scan will be repeated after couple of months.    Follow-up after 6 months     > 3 0 minutes spent discussing laboratory, radiologic and pathologic findings, the natural history of the disease and treatment as well as documentation of the visit.

## 2025-02-18 LAB
KAPPA LC SERPL-MCNC: 5.49 MG/DL (ref 0.33–1.94)
KAPPA LC/LAMBDA SER: 4.43 {RATIO} (ref 0.26–1.65)
LAMBDA LC SERPL-MCNC: 1.24 MG/DL (ref 0.57–2.63)

## 2025-02-19 LAB
ALBUMIN: 4.2 G/DL (ref 3.4–5)
ALPHA 1 GLOBULIN: 0.4 G/DL (ref 0.2–0.6)
ALPHA 2 GLOBULIN: 0.8 G/DL (ref 0.4–1.1)
BETA GLOBULIN: 1.2 G/DL (ref 0.5–1.2)
GAMMA GLOBULIN: 1.7 G/DL (ref 0.5–1.4)
IMMUNOFIXATION COMMENT: ABNORMAL
M-PROTEIN 1: 0.3 G/DL
PATH REVIEW - SERUM IMMUNOFIXATION: ABNORMAL
PATH REVIEW-SERUM PROTEIN ELECTROPHORESIS: ABNORMAL
PROTEIN ELECTROPHORESIS COMMENT: ABNORMAL

## 2025-02-28 ENCOUNTER — APPOINTMENT (OUTPATIENT)
Dept: PRIMARY CARE | Facility: CLINIC | Age: 80
End: 2025-02-28
Payer: MEDICARE

## 2025-02-28 DIAGNOSIS — F32.9 MAJOR DEPRESSIVE DISORDER WITH CURRENT ACTIVE EPISODE, UNSPECIFIED DEPRESSION EPISODE SEVERITY, UNSPECIFIED WHETHER RECURRENT: Primary | ICD-10-CM

## 2025-02-28 DIAGNOSIS — Z01.84 IMMUNITY STATUS TESTING: ICD-10-CM

## 2025-02-28 DIAGNOSIS — F41.1 GAD (GENERALIZED ANXIETY DISORDER): ICD-10-CM

## 2025-02-28 PROCEDURE — 1157F ADVNC CARE PLAN IN RCRD: CPT | Performed by: STUDENT IN AN ORGANIZED HEALTH CARE EDUCATION/TRAINING PROGRAM

## 2025-02-28 PROCEDURE — G2211 COMPLEX E/M VISIT ADD ON: HCPCS | Performed by: STUDENT IN AN ORGANIZED HEALTH CARE EDUCATION/TRAINING PROGRAM

## 2025-02-28 PROCEDURE — 1159F MED LIST DOCD IN RCRD: CPT | Performed by: STUDENT IN AN ORGANIZED HEALTH CARE EDUCATION/TRAINING PROGRAM

## 2025-02-28 PROCEDURE — 99214 OFFICE O/P EST MOD 30 MIN: CPT | Performed by: STUDENT IN AN ORGANIZED HEALTH CARE EDUCATION/TRAINING PROGRAM

## 2025-02-28 PROCEDURE — 1160F RVW MEDS BY RX/DR IN RCRD: CPT | Performed by: STUDENT IN AN ORGANIZED HEALTH CARE EDUCATION/TRAINING PROGRAM

## 2025-02-28 PROCEDURE — 1123F ACP DISCUSS/DSCN MKR DOCD: CPT | Performed by: STUDENT IN AN ORGANIZED HEALTH CARE EDUCATION/TRAINING PROGRAM

## 2025-02-28 PROCEDURE — 1036F TOBACCO NON-USER: CPT | Performed by: STUDENT IN AN ORGANIZED HEALTH CARE EDUCATION/TRAINING PROGRAM

## 2025-02-28 RX ORDER — SERTRALINE HYDROCHLORIDE 100 MG/1
100 TABLET, FILM COATED ORAL DAILY
Qty: 90 TABLET | Refills: 3 | Status: SHIPPED | OUTPATIENT
Start: 2025-02-28 | End: 2026-02-28

## 2025-02-28 ASSESSMENT — ENCOUNTER SYMPTOMS
HALLUCINATIONS: 1
CHILLS: 0
DYSPHORIC MOOD: 1
NERVOUS/ANXIOUS: 1
FEVER: 0
SLEEP DISTURBANCE: 1
SHORTNESS OF BREATH: 0

## 2025-02-28 NOTE — PROGRESS NOTES
"  Assessment/Plan   Assessment & Plan  Major depressive disorder with current active episode, unspecified depression episode severity, unspecified whether recurrent    Orders:    Referral to Psychiatry; Future  Modest increase of zoloft  Send to psychiatry given complexity  JAX (generalized anxiety disorder)    Orders:    Referral to Psychiatry; Future    sertraline (Zoloft) 100 mg tablet; Take 1 tablet (100 mg) by mouth once daily.    Immunity status testing    Orders:    Measles ([place]) Antibody, IgG; Future        Subjective   Patient ID: Wade Montgomery \"Donato\" is a 79 y.o. male who presents for No chief complaint on file..  HPI  Patient is here for follow up for depression and anxiety. Denies side effects from sertraline does seems to be improving depressive symptoms but feels that both depression and anxiety are not manageable. Additionally he has been having occasional hallucinations, has been following with neurology for this and his neurologist thinks may be 2/2 prednisone use. Of note he is also on sinemet.       Review of Systems   Constitutional:  Negative for chills and fever.   Respiratory:  Negative for shortness of breath.    Cardiovascular:  Negative for chest pain.   Psychiatric/Behavioral:  Positive for dysphoric mood, hallucinations and sleep disturbance. Negative for suicidal ideas. The patient is nervous/anxious.        Objective   Physical Exam  Constitutional:       Appearance: Normal appearance.   HENT:      Head: Normocephalic and atraumatic.   Neurological:      Mental Status: He is alert.          (Delete if audio only)    Virtual or Telephone Consent    An interactive audio and video telecommunication system which permits real time communications between the patient (at the originating site) and provider (at the distant site) was utilized to provide this telehealth service.   Verbal consent was requested and obtained from Wade Montgomery on this date, 02/28/25 for a telehealth visit " and the patient's location was confirmed at the time of the visit.    Braydon Austin MD 02/28/25 11:30 AM

## 2025-03-05 LAB — MEV IGG SER IA-ACNC: >300 AU/ML

## 2025-03-10 ENCOUNTER — APPOINTMENT (OUTPATIENT)
Dept: RADIOLOGY | Facility: HOSPITAL | Age: 80
End: 2025-03-10
Payer: MEDICARE

## 2025-03-10 ENCOUNTER — TELEPHONE (OUTPATIENT)
Facility: CLINIC | Age: 80
End: 2025-03-10
Payer: MEDICARE

## 2025-03-10 ENCOUNTER — HOSPITAL ENCOUNTER (EMERGENCY)
Facility: HOSPITAL | Age: 80
Discharge: OTHER INSTITUTION | End: 2025-03-11
Attending: EMERGENCY MEDICINE
Payer: MEDICARE

## 2025-03-10 ENCOUNTER — APPOINTMENT (OUTPATIENT)
Dept: CARDIOLOGY | Facility: HOSPITAL | Age: 80
End: 2025-03-10
Payer: MEDICARE

## 2025-03-10 DIAGNOSIS — R45.1 AGITATION: Primary | ICD-10-CM

## 2025-03-10 LAB
ALBUMIN SERPL BCP-MCNC: 4.1 G/DL (ref 3.4–5)
ALP SERPL-CCNC: 54 U/L (ref 33–136)
ALT SERPL W P-5'-P-CCNC: 15 U/L (ref 10–52)
AMPHETAMINES UR QL SCN: NORMAL
ANION GAP SERPL CALC-SCNC: 12 MMOL/L (ref 10–20)
APAP SERPL-MCNC: <10 UG/ML
APPEARANCE UR: CLEAR
AST SERPL W P-5'-P-CCNC: 26 U/L (ref 9–39)
BACTERIA #/AREA URNS AUTO: ABNORMAL /HPF
BARBITURATES UR QL SCN: NORMAL
BASOPHILS # BLD AUTO: 0.01 X10*3/UL (ref 0–0.1)
BASOPHILS NFR BLD AUTO: 0.2 %
BENZODIAZ UR QL SCN: NORMAL
BILIRUB SERPL-MCNC: 0.7 MG/DL (ref 0–1.2)
BILIRUB UR STRIP.AUTO-MCNC: NEGATIVE MG/DL
BUN SERPL-MCNC: 11 MG/DL (ref 6–23)
BZE UR QL SCN: NORMAL
CALCIUM SERPL-MCNC: 9.4 MG/DL (ref 8.6–10.3)
CANNABINOIDS UR QL SCN: NORMAL
CHLORIDE SERPL-SCNC: 101 MMOL/L (ref 98–107)
CO2 SERPL-SCNC: 26 MMOL/L (ref 21–32)
COLOR UR: ABNORMAL
CREAT SERPL-MCNC: 0.79 MG/DL (ref 0.5–1.3)
EGFRCR SERPLBLD CKD-EPI 2021: >90 ML/MIN/1.73M*2
EOSINOPHIL # BLD AUTO: 0 X10*3/UL (ref 0–0.4)
EOSINOPHIL NFR BLD AUTO: 0 %
ERYTHROCYTE [DISTWIDTH] IN BLOOD BY AUTOMATED COUNT: 13.2 % (ref 11.5–14.5)
ETHANOL SERPL-MCNC: <10 MG/DL
FENTANYL+NORFENTANYL UR QL SCN: NORMAL
GLUCOSE SERPL-MCNC: 102 MG/DL (ref 74–99)
GLUCOSE UR STRIP.AUTO-MCNC: NORMAL MG/DL
HCT VFR BLD AUTO: 38.4 % (ref 41–52)
HGB BLD-MCNC: 12.9 G/DL (ref 13.5–17.5)
HYALINE CASTS #/AREA URNS AUTO: ABNORMAL /LPF
IMM GRANULOCYTES # BLD AUTO: 0.01 X10*3/UL (ref 0–0.5)
IMM GRANULOCYTES NFR BLD AUTO: 0.2 % (ref 0–0.9)
KETONES UR STRIP.AUTO-MCNC: NEGATIVE MG/DL
LEUKOCYTE ESTERASE UR QL STRIP.AUTO: NEGATIVE
LYMPHOCYTES # BLD AUTO: 1.27 X10*3/UL (ref 0.8–3)
LYMPHOCYTES NFR BLD AUTO: 21.2 %
MCH RBC QN AUTO: 31.2 PG (ref 26–34)
MCHC RBC AUTO-ENTMCNC: 33.6 G/DL (ref 32–36)
MCV RBC AUTO: 93 FL (ref 80–100)
METHADONE UR QL SCN: NORMAL
MONOCYTES # BLD AUTO: 0.6 X10*3/UL (ref 0.05–0.8)
MONOCYTES NFR BLD AUTO: 10 %
MUCOUS THREADS #/AREA URNS AUTO: ABNORMAL /LPF
NEUTROPHILS # BLD AUTO: 4.1 X10*3/UL (ref 1.6–5.5)
NEUTROPHILS NFR BLD AUTO: 68.4 %
NITRITE UR QL STRIP.AUTO: NEGATIVE
NRBC BLD-RTO: 0 /100 WBCS (ref 0–0)
OPIATES UR QL SCN: NORMAL
OXYCODONE+OXYMORPHONE UR QL SCN: NORMAL
PCP UR QL SCN: NORMAL
PH UR STRIP.AUTO: 7 [PH]
PLATELET # BLD AUTO: 156 X10*3/UL (ref 150–450)
POTASSIUM SERPL-SCNC: 4.4 MMOL/L (ref 3.5–5.3)
PROT SERPL-MCNC: 7.8 G/DL (ref 6.4–8.2)
PROT UR STRIP.AUTO-MCNC: NEGATIVE MG/DL
RBC # BLD AUTO: 4.13 X10*6/UL (ref 4.5–5.9)
RBC # UR STRIP.AUTO: ABNORMAL MG/DL
RBC #/AREA URNS AUTO: ABNORMAL /HPF
SALICYLATES SERPL-MCNC: <3 MG/DL
SODIUM SERPL-SCNC: 135 MMOL/L (ref 136–145)
SP GR UR STRIP.AUTO: 1.01
UROBILINOGEN UR STRIP.AUTO-MCNC: NORMAL MG/DL
WBC # BLD AUTO: 6 X10*3/UL (ref 4.4–11.3)
WBC #/AREA URNS AUTO: ABNORMAL /HPF

## 2025-03-10 PROCEDURE — 96372 THER/PROPH/DIAG INJ SC/IM: CPT | Performed by: STUDENT IN AN ORGANIZED HEALTH CARE EDUCATION/TRAINING PROGRAM

## 2025-03-10 PROCEDURE — 85025 COMPLETE CBC W/AUTO DIFF WBC: CPT | Performed by: EMERGENCY MEDICINE

## 2025-03-10 PROCEDURE — 81001 URINALYSIS AUTO W/SCOPE: CPT | Mod: 59 | Performed by: EMERGENCY MEDICINE

## 2025-03-10 PROCEDURE — 80320 DRUG SCREEN QUANTALCOHOLS: CPT | Performed by: EMERGENCY MEDICINE

## 2025-03-10 PROCEDURE — 99285 EMERGENCY DEPT VISIT HI MDM: CPT | Mod: 25 | Performed by: EMERGENCY MEDICINE

## 2025-03-10 PROCEDURE — 93005 ELECTROCARDIOGRAM TRACING: CPT

## 2025-03-10 PROCEDURE — 71045 X-RAY EXAM CHEST 1 VIEW: CPT | Mod: FOREIGN READ | Performed by: RADIOLOGY

## 2025-03-10 PROCEDURE — 80307 DRUG TEST PRSMV CHEM ANLYZR: CPT | Performed by: EMERGENCY MEDICINE

## 2025-03-10 PROCEDURE — 2500000004 HC RX 250 GENERAL PHARMACY W/ HCPCS (ALT 636 FOR OP/ED): Performed by: STUDENT IN AN ORGANIZED HEALTH CARE EDUCATION/TRAINING PROGRAM

## 2025-03-10 PROCEDURE — 70450 CT HEAD/BRAIN W/O DYE: CPT | Performed by: RADIOLOGY

## 2025-03-10 PROCEDURE — 80053 COMPREHEN METABOLIC PANEL: CPT | Performed by: EMERGENCY MEDICINE

## 2025-03-10 PROCEDURE — 70450 CT HEAD/BRAIN W/O DYE: CPT

## 2025-03-10 PROCEDURE — 71045 X-RAY EXAM CHEST 1 VIEW: CPT

## 2025-03-10 RX ORDER — HALOPERIDOL LACTATE 5 MG/ML
5 INJECTION, SOLUTION INTRAMUSCULAR ONCE
Status: COMPLETED | OUTPATIENT
Start: 2025-03-10 | End: 2025-03-10

## 2025-03-10 RX ADMIN — HALOPERIDOL LACTATE 5 MG: 5 INJECTION, SOLUTION INTRAMUSCULAR at 18:55

## 2025-03-10 SDOH — HEALTH STABILITY: MENTAL HEALTH: BEHAVIORAL HEALTH(WDL): EXCEPTIONS TO WDL

## 2025-03-10 SDOH — HEALTH STABILITY: MENTAL HEALTH: HAVE YOU WISHED YOU WERE DEAD OR WISHED YOU COULD GO TO SLEEP AND NOT WAKE UP?: NO

## 2025-03-10 SDOH — HEALTH STABILITY: MENTAL HEALTH: BEHAVIORS/MOOD: COOPERATIVE

## 2025-03-10 SDOH — HEALTH STABILITY: MENTAL HEALTH: SUICIDE ASSESSMENT: ADULT (C-SSRS)

## 2025-03-10 SDOH — HEALTH STABILITY: MENTAL HEALTH: HAVE YOU EVER DONE ANYTHING, STARTED TO DO ANYTHING, OR PREPARED TO DO ANYTHING TO END YOUR LIFE?: NO

## 2025-03-10 SDOH — HEALTH STABILITY: MENTAL HEALTH: BEHAVIORS/MOOD: SLEEPING

## 2025-03-10 SDOH — HEALTH STABILITY: MENTAL HEALTH: HAVE YOU ACTUALLY HAD ANY THOUGHTS OF KILLING YOURSELF?: NO

## 2025-03-10 ASSESSMENT — LIFESTYLE VARIABLES
HAVE PEOPLE ANNOYED YOU BY CRITICIZING YOUR DRINKING: NO
HAVE YOU EVER FELT YOU SHOULD CUT DOWN ON YOUR DRINKING: NO
EVER FELT BAD OR GUILTY ABOUT YOUR DRINKING: NO
EVER HAD A DRINK FIRST THING IN THE MORNING TO STEADY YOUR NERVES TO GET RID OF A HANGOVER: NO
TOTAL SCORE: 0

## 2025-03-10 ASSESSMENT — PAIN - FUNCTIONAL ASSESSMENT: PAIN_FUNCTIONAL_ASSESSMENT: 0-10

## 2025-03-10 ASSESSMENT — PAIN SCALES - GENERAL: PAINLEVEL_OUTOF10: 0 - NO PAIN

## 2025-03-10 NOTE — TELEPHONE ENCOUNTER
Patients wife called in to advise you that the squad is taking Bill to [facility] as his hallucinations have become worse and he has become combative. She just wanted to let you know.

## 2025-03-10 NOTE — CONSULTS
"Consults     HISTORY OF PRESENT ILLNESS:  Wade Montgomery \"Bill\" is a 79 y.o. male with a past psychiatric history of depression and a past medical history of dementia, chronic inflammatory demyelinating polyneuritis, h/o subdural, bladder and prostate cancer, NSCLC of LLL s/p SBRT 2022 who was was BIB EMS to [facility] on 3/10/25 for behavior change. Psychiatry was consulted on 3/10/25 for evaluation .    CMP, CBC, urine toxicology, CT head, chest x-ray unremakable.  Urine analysis largely unconcerning but showing occasional hyaline casts, WBC, bacteria. Negative leukocyte esterase.   EKG showing NSR with HR 74 bpm, Qtc 433.     On chart review  In ED seen by Dr. Zapata  \"According to patient, he has no complaints he admits that he gone to argument with his wife when I asked what the area is about, the patient states that he declines to inform as he does not want his wife to get in trouble.  The patient states he feels well at home and feels safe at home.  EMS was called the patient's wife stated per returning as reason to bring the patient to the emergency department.     On arrival the patient's family, they state that the patient over the past few weeks has been having worsening mental functioning.  The patient has been experiencing hallucinations.  The patient has been having delusions that his wife has been running a broad for.  The patient's family state that the patient believes that his wife has been replaced by someone else.  The patient did become more agitated in the hospital to family members this is the reason the patient was brought to the emergency department for further evaluation they deny any fever or recent changes in medications but feel the patient has been seen by psychiatry.\"    Saw Dr. Guerra PCP on 2/28/25   -increase sertraline to 100 mg daily - he has been taking daily     Pimavanserin    In ED:  Received haldol 5 mg IM at 1850 - reported to be physically and verbally agitated. "     Upon interview, patient seen through evaluation cart. Patient sedated after recent IM haldol.     At bedside is Katie (mother), Yasmani (son), and Linda (daughter) Valentina.  Fa,o;y reporting patient has been telling wife she is a madame, stating people coming into their home to party, accusing wife of lying. He chronically has mild hallucinations such as seeing non-threatening people, robots, snowmen, dogs but he is largely oriented to self, others, location. Over last two weeks, patient has had significant increase in hallucinations, irritability, verbal aggression, personality changes, paranoia towards others. Noticeable pattern that he is more calm and collected during day with acute worsening in evening.     On day of ED visit, patient called 911 stating others are in their home, wife attempted to take phone, became angry. Patient used rollator to go to front door and screaming for help outside. Patient was threatening violence towards family members, though admittedly, unable to act on such threats physically.     Yasmani reports that he has daily hallucinations of people, robots, snowmen, dogs in their home. While in ED, patient stated someone was laying on floor that was not there. Noticeable increase in symptoms over last 2 weeks. Paranoia towards family, verbal aggression, personality changes.       Katie largely cares for patient due to polyneuritis and it has become difficulty in recent weeks due to patients guardedness and increased sigsn and symptoms. Additionally, reports on going poor facial expressions, new onset tremors and unclear if it is at rest or active but states more exaggerated while agitated.     1 bowel movement yesterday with straining. Has a urine drainage bag and without issue. Patient did not sleep overnight.         PSYCHIATRIC REVIEW OF SYSTEMS  Depression: depressed mood, sleep disturbance: difficulty falling asleep, fatigue or loss of energy, psychomotor agitation or retardation,  and changes in appetite or weight leading to significant weight loss or gain unintentionally   Anxiety: restlessness or feeling keyed up or on edge, irritability, and muscle tension  Anisha: expansive or irritable mood   Psychosis: visual hallucinations, paranoia, delusions: persecutory, and disorganized behavior  Delirium: confusion, disorientation, and impaired recent memory   Trauma: negative    PSYCHIATRIC HISTORY  Prior diagnoses: depression, anxiety   Prior hospitalizations: denies  History of suicide attempts: denies  History of self-harm: denies    Guardian or payee: medical POA is wife    Current psychiatric medications: sertraline 100 mg po daily     Family psychiatric history:      - Neurologic diseases: dementia and parkinsons    SUBSTANCE USE HISTORY   He reports that he has never smoked. He has been exposed to tobacco smoke. He has never used smokeless tobacco. He reports current alcohol use. He reports that he does not use drugs.    Denies    SOCIAL HISTORY  Social History     Socioeconomic History    Marital status:    Tobacco Use    Smoking status: Never     Passive exposure: Past    Smokeless tobacco: Never   Vaping Use    Vaping status: Never Used   Substance and Sexual Activity    Alcohol use: Yes     Comment: rare    Drug use: Never     Social Drivers of Health     Financial Resource Strain: Low Risk  (2/3/2025)    Overall Financial Resource Strain (CARDIA)     Difficulty of Paying Living Expenses: Not hard at all   Food Insecurity: No Food Insecurity (8/2/2024)    Hunger Vital Sign     Worried About Running Out of Food in the Last Year: Never true     Ran Out of Food in the Last Year: Never true   Transportation Needs: No Transportation Needs (12/26/2024)    OASIS : Transportation     Lack of Transportation (Medical): No     Lack of Transportation (Non-Medical): No     Patient Unable or Declines to Respond: No   Physical Activity: Inactive (2/3/2025)    Exercise Vital Sign     Days  of Exercise per Week: 0 days     Minutes of Exercise per Session: 0 min   Social Connections: Feeling Socially Integrated (12/26/2024)    OASIS : Social Isolation     Frequency of experiencing loneliness or isolation: Never   Intimate Partner Violence: Not At Risk (2/3/2025)    Humiliation, Afraid, Rape, and Kick questionnaire     Fear of Current or Ex-Partner: No     Emotionally Abused: No     Physically Abused: No     Sexually Abused: No   Housing Stability: Unknown (2/3/2025)    Housing Stability Vital Sign     Unable to Pay for Housing in the Last Year: No     Homeless in the Last Year: No      Current living situation: lives with wife who largely cares for patient. 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) (Multi)     Contact with and (suspected) exposure to unspecified communicable disease 10/24/2019    Exposure to communicable disease    Dry eye syndrome of bilateral lacrimal glands     Dry eye syndrome of both eyes    Encounter for general adult medical examination without abnormal findings 08/19/2019    Initial Medicare annual wellness visit    Excessive tear production 03/18/2024    Finger injury 03/18/2024    History of subdural hematoma 03/18/2024    Nasal congestion 03/18/2024    Other chest pain 07/14/2015    Chest pain, muscular    Pain in left hip 04/20/2022    Left hip pain    Pain in left knee 04/20/2022    Left knee pain    Pain in scrotum 03/18/2024    Pain of right lower extremity 03/18/2024    Pelvic and perineal pain 08/19/2019    Suprapubic pressure    Personal history of other diseases of the circulatory system     History of subdural hematoma    Personal history of other diseases of the digestive  system 05/10/2018    History of rectal bleeding    Personal history of other diseases of the musculoskeletal system and connective tissue 02/09/2022    History of low back pain    Personal history of other diseases of the respiratory system 02/27/2014    History of acute bronchitis    Personal history of other diseases of urinary system     History of hematuria    Personal history of other drug therapy     History of influenza vaccination    Personal history of other infectious and parasitic diseases 09/09/2019    History of tinea cruris    Personal history of other infectious and parasitic diseases 03/30/2017    History of tinea cruris    Personal history of other specified conditions 09/09/2020    History of flank pain    Personal history of other specified conditions 03/03/2020    History of nasal congestion    Personal history of other specified conditions 02/14/2022    History of dysuria    Personal history of other specified conditions 12/10/2019    History of epigastric pain    Personal history of urinary calculi 07/24/2019    History of urinary stone    Rash and other nonspecific skin eruption 01/06/2017    Rash    Retention of urine, unspecified 11/25/2020    Urinary retention with incomplete bladder emptying    Shoulder pain 03/18/2024    Strain of muscle, fascia and tendon of lower back, initial encounter 07/14/2015    Lumbar strain    Tear film insufficiency 03/18/2024    Tinea cruris 03/18/2024    Trochanteric bursitis, left hip 09/22/2021    Greater trochanteric bursitis of left hip           Prior Head trauma/TBI/LOC/seizure history: falls, s/p subdural and craniotomy        PAST SURGICAL HISTORY  Past Surgical History:   Procedure Laterality Date    CT GUIDED PERCUTANEOUS BIOPSY LUNG  2/9/2022    CT GUIDED PERCUTANEOUS BIOPSY LUNG 2/9/2022 [facility]    OTHER SURGICAL HISTORY  02/13/2020    Hernia repair    OTHER SURGICAL HISTORY  02/13/2020    Knee surgery    OTHER SURGICAL HISTORY  02/13/2020  "   Trigger finger repair    OTHER SURGICAL HISTORY  01/13/2020    Craniotomy    OTHER SURGICAL HISTORY  01/13/2020    Carpal tunnel surgery    OTHER SURGICAL HISTORY  10/27/2020    Sinus surgery    OTHER SURGICAL HISTORY  02/16/2022    Cataract surgery    OTHER SURGICAL HISTORY  01/26/2021    Nerve biospy        Additional Past Surgical History: craniotomy     FAMILY HISTORY  Family History   Problem Relation Name Age of Onset    Dementia Mother      Scoliosis Mother      Macular degeneration Father      Liver cancer Father      Heart attack Father      Other (peripheral neuropathy) Father      Diabetes type I Son      Dementia Maternal Grandmother          ALLERGIES  Oxycodone and Penicillin v    Some components of the patient's history were obtained through personal review of the patient's available medical records.    OARRS REVIEW  OARRS checked: yes  OARRS comments: 0    OBJECTIVE    VITALS      3/10/2025     1:30 PM 3/10/2025     1:33 PM 3/10/2025     2:00 PM 3/10/2025     3:48 PM 3/10/2025     4:00 PM 3/10/2025     4:11 PM 3/10/2025     6:00 PM   Vitals   Systolic 159 158  140      Diastolic 118 98  95      BP Location    Right arm      Heart Rate 82  78 73 79 77 80   Temp 36.8 °C (98.2 °F)         Resp 16  12 20 38 18 10   Height 1.753 m (5' 9\")         Weight (lb) 157.63         BMI 23.28 kg/m2         BSA (m2) 1.87 m2              MENTAL STATUS EXAM  Appearance: laying in hospital bed with blanket, sedated, wearing gown and socks  Attitude: unable to assess  Behavior: unable to assess  Motor Activity: no agitation   Speech: unable to assess  Mood: unable to assess  Affect: unable to assess  Thought Process: unable to assess  Thought Content:  unable to assess  Thought Perception: unable to assess  Cognition: unable to assess  Insight: unable to assess  Judgement: unable to assess      MEDICAL REVIEW OF SYSTEMS  Review of Systems     HOME MEDICATIONS  Medication Documentation Review Audit       Reviewed by " Braydon Austin MD (Physician) on 02/28/25 at 1131      Medication Order Taking? Sig Documenting Provider Last Dose Status   acetaminophen (TYLENOL ORAL) [id number] No Take 2 tablets by mouth if needed (Mild pain). Mere Capps MD Taking Active   aspirin 81 mg EC tablet [id number]  Take 1 tablet (81 mg) by mouth once daily. Mere Capps MD  Active   carbidopa-levodopa (Sinemet)  mg tablet [id number] No 1.5 tabs at 8am / 1.5 tabs at 12pm / 1.5 tabs at 4pm and 1 tab at 8pm Kamilla Chery MD Taking Active   carboxymethylcellulose (Refresh Plus) 0.5 % ophthalmic solution [id number] No Administer into affected eye(s) 4 times a day. Mere Capps MD Taking Active   diclofenac sodium (Voltaren) 1 % gel gel [id number] No Apply 4.5 inches (4 g) topically 4 times a day.  APPLY TO LOWER EXTREMITIES. DO NOT APPLY MORE THAN 16 GM DAILY TO ANY ONE AFFECTED JOINT. Mere Capps MD Taking Active   diphenhydramine HCl (BENADRYL ORAL) [id number] No Take 1 tablet by mouth if needed (Seasonal allergies). Mere Capps MD Taking Active   docusate sodium (Colace) 100 mg capsule [id number] No Take 2 capsules (200 mg) by mouth once daily. Mere Capps MD Taking Active   fluoride, sodium, (Prevident 5000 Booster) 1.1 % dental paste [id number]  USE TO BRUSH ON TEETH IN PLACE OF TOOTHPASTE TWICE A DAY. Mere Capps MD  Active   fluticasone (Flonase) 50 mcg/actuation nasal spray [id number] No Administer 2 sprays into each nostril if needed. Mere Capps MD Taking Active   Gammaplex 10 % solution [id number]   Mere Capps MD  Active   ibuprofen 600 mg tablet [id number] No Take 1 tablet (600 mg) by mouth every 6 hours if needed for mild pain (1 - 3). States takes 200 mg Mere Capps MD Taking Active   ketoconazole (NIZOral) 2 % cream [id number] No Apply twice daily to affected areas of face Antonio Chow MD Taking Active   Discontinued 02/28/25 1054   melatonin 5 mg  tablet [id number] No Take 1 tablet (5 mg) by mouth once daily at bedtime. Historical Provider, MD Taking Active   multivitamin tablet [id number] No Take 1 tablet by mouth once daily. Take with food Historical Provider, MD Taking Active   pimavanserin (Nuplazid) 34 mg capsule [id number]  Take 34 mg by mouth once daily. Kamilla Chery MD  Active   polyethylene glycol (Miralax) 17 gram/dose powder [id number] No Mix 17 g of powder and drink once daily. Historical Provider, MD Taking Active   predniSONE (Deltasone) 5 mg tablet [id number] No Take 1 tablet (5 mg) by mouth once daily. Dianne Robertson MD Taking Active   psyllium (Metamucil) 3.4 gram packet [id number]  Take 1 packet by mouth once daily. Historical Provider, MD  Active   Restasis 0.05 % ophthalmic emulsion [id number] No Administer 1 drop into both eyes every 12 hours. Bonita Walsh MD Taking Active   rosuvastatin (Crestor) 5 mg tablet [id number]  Take 1 tablet (5 mg) by mouth once daily. Braydon Austin MD  Active   sertraline (Zoloft) 100 mg tablet [id number]  Take 1 tablet (100 mg) by mouth once daily. Braydon Austin MD  Active     Discontinued 02/28/25 1101                     CURRENT MEDICATIONS  Scheduled medications      Continuous medications      PRN medications       LABS  Results for orders placed or performed during the hospital encounter of 03/10/25 (from the past 24 hours)   Comprehensive metabolic panel   Result Value Ref Range    Glucose 102 (H) 74 - 99 mg/dL    Sodium 135 (L) 136 - 145 mmol/L    Potassium 4.4 3.5 - 5.3 mmol/L    Chloride 101 98 - 107 mmol/L    Bicarbonate 26 21 - 32 mmol/L    Anion Gap 12 10 - 20 mmol/L    Urea Nitrogen 11 6 - 23 mg/dL    Creatinine 0.79 0.50 - 1.30 mg/dL    eGFR >90 >60 mL/min/1.73m*2    Calcium 9.4 8.6 - 10.3 mg/dL    Albumin 4.1 3.4 - 5.0 g/dL    Alkaline Phosphatase 54 33 - 136 U/L    Total Protein 7.8 6.4 - 8.2 g/dL    AST 26 9 - 39 U/L    Bilirubin, Total 0.7 0.0 - 1.2 mg/dL    ALT 15 10 - 52 U/L    Acute Toxicology Panel, Blood   Result Value Ref Range    Acetaminophen <10.0 10.0 - 30.0 ug/mL    Salicylate  <3 4 - 20 mg/dL    Alcohol <10 <=10 mg/dL   Drug Screen, Urine   Result Value Ref Range    Amphetamine Screen, Urine Presumptive Negative Presumptive Negative    Barbiturate Screen, Urine Presumptive Negative Presumptive Negative    Benzodiazepines Screen, Urine Presumptive Negative Presumptive Negative    Cannabinoid Screen, Urine Presumptive Negative Presumptive Negative    Cocaine Metabolite Screen, Urine Presumptive Negative Presumptive Negative    Fentanyl Screen, Urine Presumptive Negative Presumptive Negative    Opiate Screen, Urine Presumptive Negative Presumptive Negative    Oxycodone Screen, Urine Presumptive Negative Presumptive Negative    PCP Screen, Urine Presumptive Negative Presumptive Negative    Methadone Screen, Urine Presumptive Negative Presumptive Negative   Urinalysis with Reflex Culture and Microscopic   Result Value Ref Range    Color, Urine Light-Yellow Light-Yellow, Yellow, Dark-Yellow    Appearance, Urine Clear Clear    Specific Gravity, Urine 1.010 1.005 - 1.035    pH, Urine 7.0 5.0, 5.5, 6.0, 6.5, 7.0, 7.5, 8.0    Protein, Urine NEGATIVE NEGATIVE, 10 (TRACE), 20 (TRACE) mg/dL    Glucose, Urine Normal Normal mg/dL    Blood, Urine 0.03 (TRACE) (A) NEGATIVE mg/dL    Ketones, Urine NEGATIVE NEGATIVE mg/dL    Bilirubin, Urine NEGATIVE NEGATIVE mg/dL    Urobilinogen, Urine Normal Normal mg/dL    Nitrite, Urine NEGATIVE NEGATIVE    Leukocyte Esterase, Urine NEGATIVE NEGATIVE   Urinalysis Microscopic   Result Value Ref Range    WBC, Urine 6-10 (A) 1-5, NONE /HPF    RBC, Urine 3-5 NONE, 1-2, 3-5 /HPF    Bacteria, Urine 1+ (A) NONE SEEN /HPF    Mucus, Urine FEW Reference range not established. /LPF    Hyaline Casts, Urine OCCASIONAL (A) NONE /LPF   CBC and Auto Differential   Result Value Ref Range    WBC 6.0 4.4 - 11.3 x10*3/uL    nRBC 0.0 0.0 - 0.0 /100 WBCs    RBC 4.13 (L) 4.50 -  5.90 x10*6/uL    Hemoglobin 12.9 (L) 13.5 - 17.5 g/dL    Hematocrit 38.4 (L) 41.0 - 52.0 %    MCV 93 80 - 100 fL    MCH 31.2 26.0 - 34.0 pg    MCHC 33.6 32.0 - 36.0 g/dL    RDW 13.2 11.5 - 14.5 %    Platelets 156 150 - 450 x10*3/uL    Neutrophils % 68.4 40.0 - 80.0 %    Immature Granulocytes %, Automated 0.2 0.0 - 0.9 %    Lymphocytes % 21.2 13.0 - 44.0 %    Monocytes % 10.0 2.0 - 10.0 %    Eosinophils % 0.0 0.0 - 6.0 %    Basophils % 0.2 0.0 - 2.0 %    Neutrophils Absolute 4.10 1.60 - 5.50 x10*3/uL    Immature Granulocytes Absolute, Automated 0.01 0.00 - 0.50 x10*3/uL    Lymphocytes Absolute 1.27 0.80 - 3.00 x10*3/uL    Monocytes Absolute 0.60 0.05 - 0.80 x10*3/uL    Eosinophils Absolute 0.00 0.00 - 0.40 x10*3/uL    Basophils Absolute 0.01 0.00 - 0.10 x10*3/uL     *Note: Due to a large number of results and/or encounters for the requested time period, some results have not been displayed. A complete set of results can be found in Results Review.        IMAGING  CT head wo IV contrast    Result Date: 3/10/2025  Interpreted By:  Octavio Rodarte, STUDY: CT HEAD WO IV CONTRAST; 3/10/2025 3:32 pm   INDICATION: Signs/Symptoms:Confusion.   COMPARISON: CT head dated 09/02/2024   ACCESSION NUMBER(S): [id number]   ORDERING CLINICIAN: SINDY HARVEY   TECHNIQUE: Contiguous axial CT images were obtained through the head at 5 mm slice thickness without contrast administration.   FINDINGS: INTRACRANIAL: The ventricles, sulci and basal cisterns are within normal limits for size and configuration. The grey-white differentiation is intact. There is no mass effect or midline shift. There is no extraaxial fluid collection. There is no intracranial hemorrhage.  The calvarium is unremarkable.   EXTRACRANIAL: Visualized paranasal sinuses and mastoids are clear.       No evidence of acute cortical infarct or intracranial hemorrhage.   MACRO: None     Signed by: Octavio Rodarte 3/10/2025 3:43 PM Dictation workstation:   [id number]    XR  "chest 1 view    Result Date: 3/10/2025  STUDY: Chest Radiograph;  3/10/2025 2:49 PM INDICATION: Shortness of breath. COMPARISON: CT chest 9/2/2024 ACCESSION NUMBER(S): [id number] ORDERING CLINICIAN: SINDY HARVEY TECHNIQUE:  Frontal chest was obtained at 14:48 hours. FINDINGS: CARDIOMEDIASTINAL SILHOUETTE: Cardiomediastinal silhouette is normal in size and configuration. Right chest port.  LUNGS: Volume loss left hemithorax.  Underlying emphysema.  There are subpleural regions of fibrosis and scarring left lung.  No pneumothorax or significant pleural effusion.  ABDOMEN: No remarkable upper abdominal findings.  BONES: No acute osseous changes.    No focal regions of airspace consolidation.  Chronic volume loss left hemithorax with subpleural regions of fibrosis/scarring left lung. Signed by Higinio Hanks MD      PSYCHIATRIC RISK ASSESSMENT  Violence Risk Factors:  male, violent or homicidal ideation, clearly identified target, persecutory delusions, stress/destabilizers, and cognitive impairment with recent worsening  Acute Risk of Harm to Others is Considered: Low  Suicide Risk Factors: male, ; /[place] native, having a disability , chronic medical illness, chronic pain, life crisis (shame/despair), and mood congruent delusions  Protective Factors: social support/connectedness, positive family relationships, and marriage/partnership  Acute Risk of Harm to Self is Considered: Moderate    ASSESSMENT AND PLAN  Wade Montgomery \"Donato\" is a 79 y.o. male with a past psychiatric history of depression and a past medical history of dementia, Parkinsons, chronic inflammatory demyelinating polyneuritis, h/o falls with subdural and craniotomy , bladder and prostate cancer, NSCLC of LLL s/p SBRT 2022 who was was BIB EMS to [facility] on 3/10/25 for behavior change. Psychiatry was consulted on 3/10/25 for evaluation .    CMP, CBC, urine toxicology, CT head, chest x-ray unremakable.  Urine analysis " "largely unconcerning given patients urinary stoma recent BM 1 day ago. EKG showing NSR with HR 74 bpm, Qtc 433.     Patients increasing visual hallucinations, paranoia, persecutory delusions, threatening of physical violence while he is largely unable to care for himself at home with his wife. He is at high risk for multifactorial cognitive decline related to Parkinsons, falls with head trauma and craniotomy. Patient with recent medication titration of sertraline which may be a contributor as it is dopaminergic . Concern for delirium present but thus far, work up has been unremarkable.     For the reasons above, patient would benefit psychiatric admission. He does appear to represent a substantial and immediate risk of serious physical impairment or injury to self as he has evidence that he is unable to provide for and is not providing for basic physical needs with increased risk to injury himself during increased agitation related to cognitive decline. It is believed psychiatric admission would be safest and least restrictive environment.      IMPRESSION  Dementia with behavioral disturbance and hallucinations  H/o Depression    RECOMMENDATIONS  Safety:  - Patient does currently meet criteria for inpatient psychiatric admission.  - Patient lacks the capacity to leave AMA at this time and thus cannot leave AMA. Call [facility] if patient attempts to leave AMA.  - To evaluate decision-making capacity, recommend use of the Capacity Evaluation Tool. Search “[facility] Capacity Evaluation\" under SmartText unless the patient has a legal guardian, in which case all decisions per the legal guardian.  - Patient does require a 1:1 sitter from a psychiatric perspective at this time.    Medications:  1st line - haldol 2 mg PO q8hr PRN for agitation  2nd line - haldol 2 mg IM q8hr PRN for agitation     Work-up:  - EKG  after PRN administration    ==========  - Discussed recommendations with primary team.    Thank you for allowing " us to participate in the care of this patient. Please page [phone] with any questions or concerns.    Patient discussed with Dr. Bonilla, who agrees with above plan.    Lynette Ho MD    Medication Consent  Medication Consent: n/a; consult service

## 2025-03-10 NOTE — PROGRESS NOTES
Emergency Medicine Transition of Care Note.    I received Wade Montgomery in signout from Dr. Zapata.  Please see the previous ED provider note for all HPI, PE and MDM up to the time of signout. This is in addition to the primary record.    In brief Wade Montgomery is an 79 y.o. male presenting for   Chief Complaint   Patient presents with    Illness        Diagnoses as of 03/10/25 1625   Agitation       Medical Decision Making  Received patient in signout at this time. Patient with history of dementia. Now with new onset visual hallucinations, paranoia, states he believes his wife is running a brothel in their house and that his wife has been replaced by an imposter. Increasingly aggressive and agitated at home. Family unable to care for him and patient requiring psychiatric evaluation and placement. Medically cleared for EPAT evaluation by prior provider. Pending EPAT evaluation for placement.    Final diagnoses:   [R45.1] Agitation     Procedure  Procedures    Nelia Hester MD

## 2025-03-10 NOTE — ED PROVIDER NOTES
HPI   No chief complaint on file.      Chief complaint: Change behavior    History present was: Patient is a 79-year-old male with history of dementia presenting to the emergency department complaints of a painful change.  According to patient, he has no complaints he admits that he gone to argument with his wife when I asked what the area is about, the patient states that he declines to inform as he does not want his wife to get in trouble.  The patient states he feels well at home and feels safe at home.  EMS was called the patient's wife stated per returning as reason to bring the patient to the emergency department.    On arrival the patient's family, they state that the patient over the past few weeks has been having worsening mental functioning.  The patient has been experiencing hallucinations.  The patient has been having delusions that his wife has been running a broad for.  The patient's family state that the patient believes that his wife has been replaced by someone else.  The patient did become more agitated in the hospital to family members this is the reason the patient was brought to the emergency department for further evaluation they deny any fever or recent changes in medications but feel the patient has been seen by psychiatry.      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)     Contact with and (suspected) exposure to unspecified communicable disease 10/24/2019    Exposure to communicable disease    Dry eye syndrome of bilateral lacrimal glands     Dry eye syndrome of both eyes    Encounter for general adult medical examination without abnormal findings 08/19/2019    Initial Medicare annual wellness  visit    Excessive tear production 03/18/2024    Finger injury 03/18/2024    History of subdural hematoma 03/18/2024    Nasal congestion 03/18/2024    Other chest pain 07/14/2015    Chest pain, muscular    Pain in left hip 04/20/2022    Left hip pain    Pain in left knee 04/20/2022    Left knee pain    Pain in scrotum 03/18/2024    Pain of right lower extremity 03/18/2024    Pelvic and perineal pain 08/19/2019    Suprapubic pressure    Personal history of other diseases of the circulatory system     History of subdural hematoma    Personal history of other diseases of the digestive system 05/10/2018    History of rectal bleeding    Personal history of other diseases of the musculoskeletal system and connective tissue 02/09/2022    History of low back pain    Personal history of other diseases of the respiratory system 02/27/2014    History of acute bronchitis    Personal history of other diseases of urinary system     History of hematuria    Personal history of other drug therapy     History of influenza vaccination    Personal history of other infectious and parasitic diseases 09/09/2019    History of tinea cruris    Personal history of other infectious and parasitic diseases 03/30/2017    History of tinea cruris    Personal history of other specified conditions 09/09/2020    History of flank pain    Personal history of other specified conditions 03/03/2020    History of nasal congestion    Personal history of other specified conditions 02/14/2022    History of dysuria    Personal history of other specified conditions 12/10/2019    History of epigastric pain    Personal history of urinary calculi 07/24/2019    History of urinary stone    Rash and other nonspecific skin eruption 01/06/2017    Rash    Retention of urine, unspecified 11/25/2020    Urinary retention with incomplete bladder emptying    Shoulder pain 03/18/2024    Strain of muscle, fascia and tendon of lower back, initial encounter 07/14/2015    Lumbar  strain    Tear film insufficiency 03/18/2024    Tinea cruris 03/18/2024    Trochanteric bursitis, left hip 09/22/2021    Greater trochanteric bursitis of left hip     Past Surgical History:   Procedure Laterality Date    CT GUIDED PERCUTANEOUS BIOPSY LUNG  2/9/2022    CT GUIDED PERCUTANEOUS BIOPSY LUNG 2/9/2022 [facility]    OTHER SURGICAL HISTORY  02/13/2020    Hernia repair    OTHER SURGICAL HISTORY  02/13/2020    Knee surgery    OTHER SURGICAL HISTORY  02/13/2020    Trigger finger repair    OTHER SURGICAL HISTORY  01/13/2020    Craniotomy    OTHER SURGICAL HISTORY  01/13/2020    Carpal tunnel surgery    OTHER SURGICAL HISTORY  10/27/2020    Sinus surgery    OTHER SURGICAL HISTORY  02/16/2022    Cataract surgery    OTHER SURGICAL HISTORY  01/26/2021    Nerve biospy     Family History   Problem Relation Name Age of Onset    Dementia Mother      Scoliosis Mother      Macular degeneration Father      Liver cancer Father      Heart attack Father      Other (peripheral neuropathy) Father      Diabetes type I Son      Dementia Maternal Grandmother       Social History     Tobacco Use    Smoking status: Never     Passive exposure: Past    Smokeless tobacco: Never   Vaping Use    Vaping status: Never Used   Substance Use Topics    Alcohol use: Yes     Comment: rare    Drug use: Never       Physical Exam   ED Triage Vitals   Temp Pulse Resp BP   -- -- -- --      SpO2 Temp src Heart Rate Source Patient Position   -- -- -- --      BP Location FiO2 (%)     -- --       Physical Exam  Vitals and nursing note reviewed.   Constitutional:       General: He is not in acute distress.     Appearance: He is well-developed.   HENT:      Head: Normocephalic and atraumatic.   Eyes:      Conjunctiva/sclera: Conjunctivae normal.   Cardiovascular:      Rate and Rhythm: Normal rate and regular rhythm.      Heart sounds: No murmur heard.  Pulmonary:      Effort: Pulmonary effort is normal. No respiratory distress.      Breath sounds:  Normal breath sounds.   Abdominal:      Palpations: Abdomen is soft.      Tenderness: There is no abdominal tenderness.   Musculoskeletal:         General: No swelling.      Cervical back: Neck supple.   Skin:     General: Skin is warm and dry.      Capillary Refill: Capillary refill takes less than 2 seconds.   Neurological:      Mental Status: He is alert. Mental status is at baseline.      Comments: Oriented X 2   Psychiatric:         Mood and Affect: Mood normal.         Thought Content: Thought content is paranoid and delusional. Thought content does not include homicidal or suicidal plan.           ED Course & MDM   Diagnoses as of 03/10/25 1557   Agitation                 No data recorded                                 Medical Decision Making  Patient remained stable during my time with him in the emergency department.  Chem-7 demonstrated no significant abnormalities LFTs are within normal limits urinalysis demonstrated no significant abnormalities talk screen was negative.  CAT scan the patient's head and chest x-ray demonstrated no significant abnormalities meanwhile, the patient's EKG demonstrated normal sinus rhythm with a rate of 74 bpm isoelectric ST segments narrow QRS complexes and a QTc of 433.    At this time, the patient appears well and has a negative workup.  I feel the patient is medically cleared for psychiatric evaluation and therapy.  I do have concerns given the patient's increasing delusions hostility towards family given his history of dementia.    Amount and/or Complexity of Data Reviewed  Labs: ordered. Decision-making details documented in ED Course.  Radiology: ordered. Decision-making details documented in ED Course.  ECG/medicine tests: ordered and independent interpretation performed. Decision-making details documented in ED Course.        Procedure  Procedures     Jesus Zapata MD  03/10/25 [id number]

## 2025-03-11 ENCOUNTER — TELEPHONE (OUTPATIENT)
Dept: PRIMARY CARE | Facility: CLINIC | Age: 80
End: 2025-03-11
Payer: MEDICARE

## 2025-03-11 VITALS
DIASTOLIC BLOOD PRESSURE: 70 MMHG | SYSTOLIC BLOOD PRESSURE: 118 MMHG | HEART RATE: 78 BPM | RESPIRATION RATE: 20 BRPM | TEMPERATURE: 98.2 F | HEIGHT: 69 IN | OXYGEN SATURATION: 97 % | BODY MASS INDEX: 23.35 KG/M2 | WEIGHT: 157.63 LBS

## 2025-03-11 DIAGNOSIS — M25.562 CHRONIC PAIN OF LEFT KNEE: ICD-10-CM

## 2025-03-11 DIAGNOSIS — G89.29 CHRONIC PAIN OF LEFT KNEE: ICD-10-CM

## 2025-03-11 LAB
ATRIAL RATE: 73 BPM
HOLD SPECIMEN: NORMAL
P AXIS: 75 DEGREES
PR INTERVAL: 168 MS
Q ONSET: 251 MS
QRS COUNT: 12 BEATS
QRS DURATION: 88 MS
QT INTERVAL: 390 MS
QTC CALCULATION(BAZETT): 433 MS
QTC FREDERICIA: 418 MS
R AXIS: -32 DEGREES
SARS-COV-2 RNA RESP QL NAA+PROBE: NOT DETECTED
T AXIS: 5 DEGREES
T OFFSET: 446 MS
VENTRICULAR RATE: 74 BPM

## 2025-03-11 PROCEDURE — 2500000001 HC RX 250 WO HCPCS SELF ADMINISTERED DRUGS (ALT 637 FOR MEDICARE OP): Performed by: EMERGENCY MEDICINE

## 2025-03-11 PROCEDURE — 2500000002 HC RX 250 W HCPCS SELF ADMINISTERED DRUGS (ALT 637 FOR MEDICARE OP, ALT 636 FOR OP/ED): Mod: MUE | Performed by: EMERGENCY MEDICINE

## 2025-03-11 PROCEDURE — 87635 SARS-COV-2 COVID-19 AMP PRB: CPT | Performed by: EMERGENCY MEDICINE

## 2025-03-11 PROCEDURE — 2500000004 HC RX 250 GENERAL PHARMACY W/ HCPCS (ALT 636 FOR OP/ED): Performed by: EMERGENCY MEDICINE

## 2025-03-11 RX ORDER — ACETAMINOPHEN 500 MG
5 TABLET ORAL NIGHTLY
Status: DISCONTINUED | OUTPATIENT
Start: 2025-03-11 | End: 2025-03-11 | Stop reason: HOSPADM

## 2025-03-11 RX ORDER — FLUTICASONE PROPIONATE 50 MCG
2 SPRAY, SUSPENSION (ML) NASAL DAILY
Status: DISCONTINUED | OUTPATIENT
Start: 2025-03-11 | End: 2025-03-11 | Stop reason: HOSPADM

## 2025-03-11 RX ORDER — CARBIDOPA AND LEVODOPA 25; 100 MG/1; MG/1
1 TABLET ORAL 3 TIMES DAILY
Status: DISCONTINUED | OUTPATIENT
Start: 2025-03-11 | End: 2025-03-11 | Stop reason: HOSPADM

## 2025-03-11 RX ORDER — NAPROXEN SODIUM 220 MG/1
81 TABLET, FILM COATED ORAL DAILY
Status: DISCONTINUED | OUTPATIENT
Start: 2025-03-11 | End: 2025-03-11 | Stop reason: HOSPADM

## 2025-03-11 RX ORDER — SERTRALINE HYDROCHLORIDE 50 MG/1
100 TABLET, FILM COATED ORAL DAILY
Status: DISCONTINUED | OUTPATIENT
Start: 2025-03-11 | End: 2025-03-11 | Stop reason: HOSPADM

## 2025-03-11 RX ORDER — PREDNISONE 5 MG/1
5 TABLET ORAL DAILY
Status: DISCONTINUED | OUTPATIENT
Start: 2025-03-11 | End: 2025-03-11 | Stop reason: HOSPADM

## 2025-03-11 RX ORDER — ROSUVASTATIN CALCIUM 10 MG/1
5 TABLET, COATED ORAL NIGHTLY
Status: DISCONTINUED | OUTPATIENT
Start: 2025-03-11 | End: 2025-03-11 | Stop reason: HOSPADM

## 2025-03-11 RX ADMIN — CARBIDOPA AND LEVODOPA 1 TABLET: 25; 100 TABLET ORAL at 08:11

## 2025-03-11 RX ADMIN — SERTRALINE HYDROCHLORIDE 100 MG: 50 TABLET ORAL at 08:12

## 2025-03-11 RX ADMIN — CARBIDOPA AND LEVODOPA 1 TABLET: 25; 100 TABLET ORAL at 14:09

## 2025-03-11 RX ADMIN — ASPIRIN 81 MG CHEWABLE TABLET 81 MG: 81 TABLET CHEWABLE at 08:09

## 2025-03-11 RX ADMIN — PREDNISONE 5 MG: 5 TABLET ORAL at 08:10

## 2025-03-11 RX ADMIN — FLUTICASONE PROPIONATE 2 SPRAY: 50 SPRAY, METERED NASAL at 08:41

## 2025-03-11 SDOH — HEALTH STABILITY: MENTAL HEALTH: BEHAVIORS/MOOD: CALM

## 2025-03-11 SDOH — HEALTH STABILITY: MENTAL HEALTH: BEHAVIORAL HEALTH(WDL): EXCEPTIONS TO WDL

## 2025-03-11 SDOH — HEALTH STABILITY: MENTAL HEALTH: BEHAVIORS/MOOD: CALM;SLEEPING

## 2025-03-11 SDOH — HEALTH STABILITY: MENTAL HEALTH

## 2025-03-11 ASSESSMENT — PAIN SCALES - GENERAL
PAINLEVEL_OUTOF10: 0 - NO PAIN

## 2025-03-11 NOTE — PROGRESS NOTES
This progress note represents a emergency department transition note for signout of care.    Patient care was signed out to me. Please see the previous provider's notes for the full history and physical. Briefly, Wade Montgomery is 79 y.o. male who has a history of dementia and was brought into the ED for worsening aggressive behavior. The patient is signed out pending EPAT placement. I reviewed PCP note and ordered home meds.     EPAT placed the patient to [facility] in [place]. EMTALA completed. Family member in agreement for transfer. Patient transferred.     Mino Mata DO  Emergency Medicine    ED Course as of 03/12/25 1126   Tue Mar 11, 2025   0711 Reviewed patient primary care note from February 28, 2025.  There is no medications listed.  Medication list includes daily 81 mg aspirin, carbidopa levodopa  mg, Flonase, melatonin 5 mg,primavanserin 34 mg, prednisone 5 mg daily, Crestor 5 mg daily Zoloft 100 mg daily [WJ]      ED Course User Index  [WJ] Wade Mata DO         Diagnoses as of 03/12/25 1126   Agitation

## 2025-03-11 NOTE — ED NOTES
(L) PAS eta 1625  [facility]  [phone]  Dr. Balta Maurer, EMT  03/11/25 [id number]

## 2025-03-11 NOTE — TELEPHONE ENCOUNTER
Patients wife jacinto called and LVM   Was letting us know that yesterday pt had a severe break in reality and ended up in the hospital, he is currently at [facility]. She is saying they are thinking that the [facility] will be where he will be going next.   She said that her kids are with her and that they are going to see him later today.   Will keep us informed.   Any questions her number is [phone]

## 2025-03-17 ENCOUNTER — TELEPHONE (OUTPATIENT)
Dept: PRIMARY CARE | Facility: CLINIC | Age: 80
End: 2025-03-17
Payer: MEDICARE

## 2025-03-17 NOTE — TELEPHONE ENCOUNTER
Inessa from [facility] LVM.   Pt passed away on 3/14/25 at [facility]. Was told that you would sign death certificate.    Are you willing to sign?

## 2025-03-19 ENCOUNTER — APPOINTMENT (OUTPATIENT)
Dept: ORTHOPEDIC SURGERY | Facility: CLINIC | Age: 80
End: 2025-03-19
Payer: MEDICARE

## 2025-03-19 NOTE — TELEPHONE ENCOUNTER
Requested ER records and received them.   Death certificate signed.  Called and notified  home will come to . It was placed up front.     ER notes, death cert, and request for er records scanned into media.   Changed chart to .

## 2025-03-20 ENCOUNTER — APPOINTMENT (OUTPATIENT)
Dept: ORTHOPEDIC SURGERY | Facility: CLINIC | Age: 80
End: 2025-03-20
Payer: MEDICARE

## 2025-04-01 ENCOUNTER — APPOINTMENT (OUTPATIENT)
Dept: DERMATOLOGY | Facility: CLINIC | Age: 80
End: 2025-04-01
Payer: MEDICARE

## 2025-04-07 ENCOUNTER — APPOINTMENT (OUTPATIENT)
Dept: RADIATION ONCOLOGY | Facility: HOSPITAL | Age: 80
End: 2025-04-07
Payer: MEDICARE

## 2025-04-07 ENCOUNTER — APPOINTMENT (OUTPATIENT)
Dept: RADIOLOGY | Facility: HOSPITAL | Age: 80
End: 2025-04-07
Payer: MEDICARE

## 2025-04-08 ENCOUNTER — APPOINTMENT (OUTPATIENT)
Dept: RADIATION ONCOLOGY | Facility: HOSPITAL | Age: 80
End: 2025-04-08
Payer: MEDICARE

## 2025-05-02 ENCOUNTER — APPOINTMENT (OUTPATIENT)
Dept: RADIOLOGY | Facility: CLINIC | Age: 80
End: 2025-05-02
Payer: MEDICARE

## 2025-05-02 ENCOUNTER — APPOINTMENT (OUTPATIENT)
Dept: HEMATOLOGY/ONCOLOGY | Facility: CLINIC | Age: 80
End: 2025-05-02
Payer: MEDICARE

## 2025-05-28 ENCOUNTER — APPOINTMENT (OUTPATIENT)
Dept: UROLOGY | Facility: CLINIC | Age: 80
End: 2025-05-28
Payer: MEDICARE

## 2025-05-29 ENCOUNTER — APPOINTMENT (OUTPATIENT)
Dept: UROLOGY | Facility: CLINIC | Age: 80
End: 2025-05-29
Payer: MEDICARE

## 2025-05-30 ENCOUNTER — APPOINTMENT (OUTPATIENT)
Dept: PRIMARY CARE | Facility: CLINIC | Age: 80
End: 2025-05-30
Payer: MEDICARE

## 2025-06-03 ENCOUNTER — APPOINTMENT (OUTPATIENT)
Dept: DERMATOLOGY | Facility: CLINIC | Age: 80
End: 2025-06-03
Payer: MEDICARE

## 2025-06-18 ENCOUNTER — APPOINTMENT (OUTPATIENT)
Dept: OPHTHALMOLOGY | Facility: CLINIC | Age: 80
End: 2025-06-18
Payer: MEDICARE

## 2025-06-30 ENCOUNTER — APPOINTMENT (OUTPATIENT)
Facility: CLINIC | Age: 80
End: 2025-06-30
Payer: MEDICARE

## 2025-08-18 ENCOUNTER — APPOINTMENT (OUTPATIENT)
Dept: HEMATOLOGY/ONCOLOGY | Facility: CLINIC | Age: 80
End: 2025-08-18
Payer: MEDICARE